# Patient Record
Sex: FEMALE | Race: WHITE | NOT HISPANIC OR LATINO
[De-identification: names, ages, dates, MRNs, and addresses within clinical notes are randomized per-mention and may not be internally consistent; named-entity substitution may affect disease eponyms.]

---

## 2019-03-06 PROBLEM — Z00.00 ENCOUNTER FOR PREVENTIVE HEALTH EXAMINATION: Status: ACTIVE | Noted: 2019-03-06

## 2019-04-22 ENCOUNTER — APPOINTMENT (OUTPATIENT)
Dept: ENDOCRINOLOGY | Facility: CLINIC | Age: 84
End: 2019-04-22
Payer: MEDICARE

## 2019-04-22 DIAGNOSIS — Z63.4 DISAPPEARANCE AND DEATH OF FAMILY MEMBER: ICD-10-CM

## 2019-04-22 DIAGNOSIS — Z78.9 OTHER SPECIFIED HEALTH STATUS: ICD-10-CM

## 2019-04-22 DIAGNOSIS — S06.5X9A TRAUMATIC SUBDURAL HEMORRHAGE WITH LOSS OF CONSCIOUSNESS OF UNSPECIFIED DURATION, INITIAL ENCOUNTER: ICD-10-CM

## 2019-04-22 SDOH — SOCIAL STABILITY - SOCIAL INSECURITY: DISSAPEARANCE AND DEATH OF FAMILY MEMBER: Z63.4

## 2019-04-29 ENCOUNTER — APPOINTMENT (OUTPATIENT)
Dept: ENDOCRINOLOGY | Facility: CLINIC | Age: 84
End: 2019-04-29
Payer: MEDICARE

## 2019-04-29 PROCEDURE — 95251 CONT GLUC MNTR ANALYSIS I&R: CPT

## 2019-04-29 PROCEDURE — 99214 OFFICE O/P EST MOD 30 MIN: CPT | Mod: 25

## 2019-04-29 PROCEDURE — 36415 COLL VENOUS BLD VENIPUNCTURE: CPT

## 2019-04-29 PROCEDURE — 95250 CONT GLUC MNTR PHYS/QHP EQP: CPT

## 2019-04-29 NOTE — HISTORY OF PRESENT ILLNESS
[FreeTextEntry1] : takes  Basaglar  10 units qam,  NISS ac B ( 13-15-18-19-84-52-24-25), ac L (6-3-6-32-84-68-14-15), ac D (4-5-9-9-10-11-12-13)\par \par no  recent labs available to me\par Blood sugars are checked 4 times a day.  \par \par Log book: FBS- 137-182 , ac L- 129-217, ac D - 158-233 ( few episodes of 53 and 73), HS- \par labs from 7/2/18- a1c- 7.6, creat- 1.01, LDL-116, TSH- 1.95, FT4- 1.37, 25D- 46.9\par \par \par Last dilated eye exam was in 6/18 (Dr Martinez)  \par Last podiatry visit was several years ago.  \par Last cardiology evaluation in 2017 (Dr. Bradley). \par Last stress test in 2017\par Last 2-D Echo in 2017\par \par \par \par HISTORY OF PRESENT ILLNESS.   \par Patient was diagnosed with Diabetes Mellitus Type 2 in  1987.   Denies known complications of retinopathy, nephropathy, or neuropathy.  \par Reports  history  HTN, dyslipidemia. Denies CAD. \par \par Reports GI intolerance (diarrhea) on most oral hypoglycemic medications in the past.  Past several weeks with occasional postprandial hypoglycemias in mid 60's- 70's. She is concerned regarding trying another OHG because of persistent diarrhea.\par \par Hypoglycemia frequency: reports history of  severe hypoglycemia several years ago, requiring  hospitalization.  \par Diet: not following ADA.  Exercise: none.

## 2019-04-29 NOTE — ASSESSMENT
[FreeTextEntry1] : Patient is reluctant to try any other OHG because of diarrhea, and prefers to remain on MDI regimen.\par - increase Basaglar 12 un \par - NISS ac B (13-15-18-13-76-24-24-25), ac L (9-4-5-8-9-10-11-12), ac D (1-0-1-9-10-11-12-13)\par - bedtime snack!\par - haleigh PRO\par Avoid hypoglycemia in this elderly patient with multiple medical issues\par - calcium/ D supplements. Falls prevention\par - advised on DXA scan- patient is to channel via PCP\par RTC 3 months. Bring sensor in 2-3 weeks .

## 2019-04-30 LAB
25(OH)D3 SERPL-MCNC: 31.3 NG/ML
ALBUMIN SERPL ELPH-MCNC: 4.2 G/DL
ALP BLD-CCNC: 84 U/L
ALT SERPL-CCNC: 9 U/L
ANION GAP SERPL CALC-SCNC: 15 MMOL/L
AST SERPL-CCNC: 12 U/L
BILIRUB SERPL-MCNC: 0.3 MG/DL
BUN SERPL-MCNC: 26 MG/DL
CALCIUM SERPL-MCNC: 9.3 MG/DL
CHLORIDE SERPL-SCNC: 108 MMOL/L
CO2 SERPL-SCNC: 20 MMOL/L
CREAT SERPL-MCNC: 1.19 MG/DL
ESTIMATED AVERAGE GLUCOSE: 140 MG/DL
FRUCTOSAMINE SERPL-MCNC: 256 UMOL/L
GLUCOSE SERPL-MCNC: 154 MG/DL
GLYCOMARK.: 14.5 UG/ML
HBA1C MFR BLD HPLC: 6.5 %
POTASSIUM SERPL-SCNC: 4.1 MMOL/L
PROT SERPL-MCNC: 6.9 G/DL
SODIUM SERPL-SCNC: 143 MMOL/L
T4 FREE SERPL-MCNC: 1.2 NG/DL
TSH SERPL-ACNC: 2.67 UIU/ML

## 2019-05-13 ENCOUNTER — APPOINTMENT (OUTPATIENT)
Dept: ENDOCRINOLOGY | Facility: CLINIC | Age: 84
End: 2019-05-13
Payer: MEDICARE

## 2019-05-13 VITALS
WEIGHT: 145 LBS | HEIGHT: 57.09 IN | BODY MASS INDEX: 31.28 KG/M2 | SYSTOLIC BLOOD PRESSURE: 130 MMHG | DIASTOLIC BLOOD PRESSURE: 70 MMHG

## 2019-05-13 LAB — GLUCOSE BLDC GLUCOMTR-MCNC: 209

## 2019-05-13 PROCEDURE — 99213 OFFICE O/P EST LOW 20 MIN: CPT | Mod: 25

## 2019-05-13 PROCEDURE — 82962 GLUCOSE BLOOD TEST: CPT

## 2019-05-13 NOTE — ASSESSMENT
[FreeTextEntry1] : Patient is reluctant to try any other OHG because of diarrhea, and prefers to remain on MDI regimen.\par - switch to Tresiba  8 un qd\par - decr  NISS ac B (9-10-12-83-83-62-19-20-21), ac L (0-9-2-8-6-1-10-11-12), ac D (9-8-3-8-9-10-11-12-13)\par - bedtime snack!\par Avoid hypoglycemia in this elderly patient with multiple medical issues\par - calcium/ D supplements. Falls prevention\par - advised on DXA scan- patient is to channel via PCP\par RTC 3 months.

## 2019-05-13 NOTE — HISTORY OF PRESENT ILLNESS
[FreeTextEntry1] : Patient returned for haleigh interpretation and diabetes management\par see detailed CDE note\par in target-77%\par below 70- 11%, and below 54- 3%\par freq in 70's in the middle of night, multiple hypo's below 70 ac lunch\par \par takes   Basaglar 12 un. noticed significantly lower BS while switching to Basaglar from Lantus. \par Self resumed Lantus 10 un this am. However, lantus m\par Also on  NISS ac B (13-15-18-07-93-82-24-25), ac L (0-5-2-8-9-10-11-12), ac D (4-4-2-9-10-11-12-13)\par \par a1c- 6.5, f/a- 256, g/m- 14.5\par TSH- 2.67\par Blood sugars are checked 4 times a day.  \par \par Log book: FBS- 137-182 , ac L- 129-217, ac D - 158-233 ( few episodes of 53 and 73), HS- \par labs from 7/2/18- a1c- 7.6, creat- 1.01, LDL-116, TSH- 1.95, FT4- 1.37, 25D- 46.9\par \par \par Last dilated eye exam was in 6/18 (Dr Martinez)  \par Last podiatry visit was several years ago.  \par Last cardiology evaluation in 2017 (Dr. Bradley). \par Last stress test in 2017\par Last 2-D Echo in 2017\par \par \par \par HISTORY OF PRESENT ILLNESS.   \par Patient was diagnosed with Diabetes Mellitus Type 2 in  1987.   Denies known complications of retinopathy, nephropathy, or neuropathy.  \par Reports  history  HTN, dyslipidemia. Denies CAD. \par \par Reports GI intolerance (diarrhea) on most oral hypoglycemic medications in the past.  Past several weeks with occasional postprandial hypoglycemias in mid 60's- 70's. She is concerned regarding trying another OHG because of persistent diarrhea.\par \par Hypoglycemia frequency: reports history of  severe hypoglycemia several years ago, requiring  hospitalization.  \par Diet: not following ADA.  Exercise: none.

## 2019-05-28 NOTE — REASON FOR VISIT
Procedure(s):  COLONOSCOPY  ESOPHAGOGASTRODUODENOSCOPY (EGD)  ESOPHAGOGASTRODUODENAL (EGD) BIOPSY  ENDOSCOPIC POLYPECTOMY. MAC    Anesthesia Post Evaluation      Multimodal analgesia: multimodal analgesia not used between 6 hours prior to anesthesia start to PACU discharge  Patient location during evaluation: PACU  Patient participation: complete - patient participated  Level of consciousness: awake and alert  Pain score: 0  Airway patency: patent  Anesthetic complications: no  Cardiovascular status: acceptable  Respiratory status: acceptable  Hydration status: acceptable  Post anesthesia nausea and vomiting:  none      Vitals Value Taken Time   /71 5/28/2019 12:13 PM   Temp     Pulse 85 5/28/2019 12:18 PM   Resp 18 5/28/2019 12:18 PM   SpO2 98 % 5/28/2019 12:18 PM   Vitals shown include unvalidated device data. [Follow-Up: _____] : a [unfilled] follow-up visit

## 2019-08-15 ENCOUNTER — APPOINTMENT (OUTPATIENT)
Dept: ENDOCRINOLOGY | Facility: CLINIC | Age: 84
End: 2019-08-15
Payer: MEDICARE

## 2019-08-15 VITALS
DIASTOLIC BLOOD PRESSURE: 70 MMHG | BODY MASS INDEX: 31.28 KG/M2 | RESPIRATION RATE: 16 BRPM | OXYGEN SATURATION: 98 % | SYSTOLIC BLOOD PRESSURE: 120 MMHG | HEART RATE: 63 BPM | WEIGHT: 145 LBS | HEIGHT: 57.09 IN

## 2019-08-15 LAB — GLUCOSE BLDC GLUCOMTR-MCNC: 130

## 2019-08-15 PROCEDURE — 99214 OFFICE O/P EST MOD 30 MIN: CPT | Mod: 25

## 2019-08-15 PROCEDURE — 36415 COLL VENOUS BLD VENIPUNCTURE: CPT

## 2019-08-15 PROCEDURE — 82962 GLUCOSE BLOOD TEST: CPT

## 2019-08-15 NOTE — HISTORY OF PRESENT ILLNESS
[FreeTextEntry1] : *** Aug 15, 2019 ***\par \par Tresiba is not covered\par *** hypotension on Basaglar***\par taking  Lantus 8 un qd;   NISS ac B (9-10-12-88-09-51-19-20-21), ac L (9-8-9-7-3-8-10-11-12), ac D (6-9-3-8-9-10-11-12-13)\par takes 4 un of novolog every night, before bedtime. reports occas midnight mid 60's\par tests 4 x day\par log: FBS-  122-166, ac L- 140-200, ac D- 132- 192, HS- 133-257\par \par \par *** May 13, 2019 ***\par \par Patient returned for haleigh interpretation and diabetes management\par see detailed CDE note\par in target-77%\par below 70- 11%, and below 54- 3%\par freq in 70's in the middle of night, multiple hypo's below 70 ac lunch\par \par takes   Basaglar 12 un. noticed significantly lower BS while switching to Basaglar from Lantus. \par Self resumed Lantus 10 un this am. However, lantus m\par Also on  NISS ac B (13-15-18-14-03-38-24-25), ac L (2-2-3-8-9-10-11-12), ac D (5-9-1-9-10-11-12-13)\par \par a1c- 6.5, f/a- 256, g/m- 14.5\par TSH- 2.67\par Blood sugars are checked 4 times a day.  \par \par Log book: FBS- 137-182 , ac L- 129-217, ac D - 158-233 ( few episodes of 53 and 73), HS- \par labs from 7/2/18- a1c- 7.6, creat- 1.01, LDL-116, TSH- 1.95, FT4- 1.37, 25D- 46.9\par \par \par Last dilated eye exam was in 6/18 (Dr Martinez)  \par Last podiatry visit was several years ago.  \par Last cardiology evaluation in 2017 (Dr. Bradley). \par Last stress test in 2017\par Last 2-D Echo in 2017\par \par \par \par HISTORY OF PRESENT ILLNESS.   \par Patient was diagnosed with Diabetes Mellitus Type 2 in  1987.   Denies known complications of retinopathy, nephropathy, or neuropathy.  \par Reports  history  HTN, dyslipidemia. Denies CAD. \par \par Reports GI intolerance (diarrhea) on most oral hypoglycemic medications in the past.  Past several weeks with occasional postprandial hypoglycemias in mid 60's- 70's. She is concerned regarding trying another OHG because of persistent diarrhea.\par \par Hypoglycemia frequency: reports history of  severe hypoglycemia several years ago, requiring  hospitalization.  \par Diet: not following ADA.  Exercise: none.

## 2019-08-15 NOTE — ASSESSMENT
Telephone Encounter by Ana Franklin at 06/09/17 02:06 PM     Author:  Ana Franklin Service:  (none) Author Type:       Filed:  06/09/17 02:06 PM Encounter Date:  6/7/2017 Status:  Signed     :  Ana Franklin ()            Patient notified.  Vernon verbalized understanding of information given.[SP1.1T]        Revision History        User Key Date/Time User Provider Type Action    > SP1.1 06/09/17 02:06 PM Ana Franklin  Sign    T - Template             [FreeTextEntry1] : Patient is reluctant to try any other OHG because of diarrhea, and prefers to remain on MDI regimen.\par - cont Lantus 8 un qd\par - change  NISS ac B (9-10-12-72-82-01-19-20-21), ac L (4-9-7-8-9-10-11-12-13), ac D (9-5-1-9-10-11-12-13-14)\par - NO novolog at bedtime and have bedtime snack!\par Avoid hypoglycemia in this elderly patient with multiple medical issues\par - calcium/ D supplements. Falls prevention\par - advised on DXA scan- patient is to channel via PCP\par - labs today\par - Jose 14 days\par RTC 3 months.

## 2019-08-16 LAB
25(OH)D3 SERPL-MCNC: 29.1 NG/ML
ALBUMIN SERPL ELPH-MCNC: 4 G/DL
ALP BLD-CCNC: 89 U/L
ALT SERPL-CCNC: 5 U/L
ANION GAP SERPL CALC-SCNC: 14 MMOL/L
AST SERPL-CCNC: 13 U/L
BILIRUB SERPL-MCNC: 0.2 MG/DL
BUN SERPL-MCNC: 32 MG/DL
CALCIUM SERPL-MCNC: 9 MG/DL
CHLORIDE SERPL-SCNC: 110 MMOL/L
CHOLEST SERPL-MCNC: 221 MG/DL
CHOLEST/HDLC SERPL: 4.8 RATIO
CO2 SERPL-SCNC: 19 MMOL/L
CREAT SERPL-MCNC: 1.52 MG/DL
ESTIMATED AVERAGE GLUCOSE: 143 MG/DL
FRUCTOSAMINE SERPL-MCNC: 254 UMOL/L
GLUCOSE SERPL-MCNC: 133 MG/DL
HBA1C MFR BLD HPLC: 6.6 %
HDLC SERPL-MCNC: 46 MG/DL
LDLC SERPL CALC-MCNC: 121 MG/DL
POTASSIUM SERPL-SCNC: 4.6 MMOL/L
PROT SERPL-MCNC: 6.6 G/DL
SODIUM SERPL-SCNC: 143 MMOL/L
T4 FREE SERPL-MCNC: 1.1 NG/DL
TRIGL SERPL-MCNC: 269 MG/DL
TSH SERPL-ACNC: 3 UIU/ML

## 2019-08-22 ENCOUNTER — RX CHANGE (OUTPATIENT)
Age: 84
End: 2019-08-22

## 2019-09-26 ENCOUNTER — APPOINTMENT (OUTPATIENT)
Dept: ENDOCRINOLOGY | Facility: CLINIC | Age: 84
End: 2019-09-26
Payer: MEDICARE

## 2019-09-26 VITALS
HEIGHT: 57.09 IN | OXYGEN SATURATION: 97 % | HEART RATE: 65 BPM | RESPIRATION RATE: 16 BRPM | SYSTOLIC BLOOD PRESSURE: 110 MMHG | WEIGHT: 140 LBS | BODY MASS INDEX: 30.2 KG/M2 | DIASTOLIC BLOOD PRESSURE: 60 MMHG

## 2019-09-26 LAB — GLUCOSE BLDC GLUCOMTR-MCNC: 200

## 2019-09-26 PROCEDURE — 99214 OFFICE O/P EST MOD 30 MIN: CPT | Mod: 25

## 2019-09-26 PROCEDURE — 82962 GLUCOSE BLOOD TEST: CPT

## 2019-09-26 NOTE — ASSESSMENT
[Hypoglycemia Management] : hypoglycemia management [Carbohydrate Consistent Diet] : carbohydrate consistent diet [Action and use of Insulin] : action and use of short and long-acting insulin [Diabetes Foot Care] : diabetes foot care [Self Monitoring of Blood Glucose] : self monitoring of blood glucose [FreeTextEntry1] : Patient is reluctant to try any other OHG because of diarrhea, and prefers to remain on MDI regimen.\par - cont Lantus 8 un qd\par - change  NISS ac B (7-8-10-94-88-21-17-18-19), ac L (0-2-1-8-9-10-11-12-13), ac D (1-6-3-10-11-12-13-14-15)\par - NO novolog at bedtime and must have bedtime snack!\par Avoid hypoglycemia in this elderly patient with multiple medical issues\par - calcium/ D supplements. Falls prevention\par - advised on DXA scan- patient is to channel via PCP\par - Jose 14 days\par RTC 3 months.

## 2019-09-26 NOTE — REASON FOR VISIT
CM met patient at bedside to complete discharge planning assessment. Patient lives with spouse, Giuseppe , who is also POA, denies living iwll, pharmacy is [facility], reports use of wheelchair, cane and or rolling walker as needs. Patient denies home health services. PCP is Dr. Edmondson and patient plans to return home with no needs.      04/30/19 1235   Discharge Assessment   Assessment Type Discharge Planning Assessment   Confirmed/corrected address and phone number on facesheet? Yes   Assessment information obtained from? Patient   Communicated expected length of stay with patient/caregiver yes   Prior to hospitilization cognitive status: Alert/Oriented   Prior to hospitalization functional status: Assistive Equipment   Current cognitive status: Alert/Oriented   Current Functional Status: Assistive Equipment   Lives With spouse   Able to Return to Prior Arrangements yes   Is patient able to care for self after discharge? Yes   Patient's perception of discharge disposition home or selfcare   Readmission Within the Last 30 Days no previous admission in last 30 days   Patient currently being followed by outpatient case management? No   Patient currently receives any other outside agency services? No   Equipment Currently Used at Home wheelchair;walker, rolling;cane, straight   Do you have any problems affording any of your prescribed medications? No   Is the patient taking medications as prescribed? yes   Does the patient have transportation home? Yes   Transportation Anticipated family or friend will provide   Does the patient receive services at [facility]? No   Discharge Plan A Home   DME Needed Upon Discharge  none   Patient/Family in Agreement with Plan yes      [Follow-Up: _____] : a [unfilled] follow-up visit

## 2019-09-26 NOTE — HISTORY OF PRESENT ILLNESS
[FreeTextEntry1] : F/u for DM2\par \par *** Sep 26, 2019 ***\par on Lantus 8 un, NISS ac B (9-10-12-82-59-20-19-20-21), ac L (9-9-2-8-9-10-11-12-13), ac D (5-7-3-9-10-11-12-13-14)\par freq injects novolog at bedtime if FS > 160 (uses half of the previous scale)\par log: FBS- 148-186, ac L- 114-142 (2 epsides of low 60's), ac D- 116-242, HS- \par \par a1c- 6.6, f/am- 254\par TSH- 3.0\par LDL- 121\par creat- 1.5\par *** Aug 15, 2019 ***\par \par Tresiba is not covered\par *** hypotension on Basaglar***\par taking  Lantus 8 un qd;   NISS ac B (9-10-12-92-48-64-19-20-21), ac L (1-1-6-4-1-6-10-11-12), ac D (8-3-9-8-9-10-11-12-13)\par takes 4 un of novolog every night, before bedtime. reports occas midnight mid 60's\par tests 4 x day\par log: FBS-  122-166, ac L- 140-200, ac D- 132- 192, HS- 133-257\par \par \par *** May 13, 2019 ***\par \par Patient returned for haleigh interpretation and diabetes management\par see detailed CDE note\par in target-77%\par below 70- 11%, and below 54- 3%\par freq in 70's in the middle of night, multiple hypo's below 70 ac lunch\par \par takes   Basaglar 12 un. noticed significantly lower BS while switching to Basaglar from Lantus. \par Self resumed Lantus 10 un this am. However, lantus m\par Also on  NISS ac B (13-15-18-05-80-70-24-25), ac L (0-9-3-8-9-10-11-12), ac D (1-9-0-9-10-11-12-13)\par \par a1c- 6.5, f/a- 256, g/m- 14.5\par TSH- 2.67\par Blood sugars are checked 4 times a day.  \par \par Log book: FBS- 137-182 , ac L- 129-217, ac D - 158-233 ( few episodes of 53 and 73), HS- \par labs from 7/2/18- a1c- 7.6, creat- 1.01, LDL-116, TSH- 1.95, FT4- 1.37, 25D- 46.9\par \par \par Last dilated eye exam was in 6/18 (Dr Martinez)  \par Last podiatry visit was several years ago.  \par Last cardiology evaluation in 2017 (Dr. Bradley). \par Last stress test in 2017\par Last 2-D Echo in 2017\par \par \par \par HISTORY OF PRESENT ILLNESS.   \par Patient was diagnosed with Diabetes Mellitus Type 2 in  1987.   Denies known complications of retinopathy, nephropathy, or neuropathy.  \par Reports  history  HTN, dyslipidemia. Denies CAD. \par \par Reports GI intolerance (diarrhea) on most oral hypoglycemic medications in the past.  Past several weeks with occasional postprandial hypoglycemias in mid 60's- 70's. She is concerned regarding trying another OHG because of persistent diarrhea.\par \par Hypoglycemia frequency: reports history of  severe hypoglycemia several years ago, requiring  hospitalization.  \par Diet: not following ADA.  Exercise: none.

## 2019-11-15 ENCOUNTER — APPOINTMENT (OUTPATIENT)
Dept: ENDOCRINOLOGY | Facility: CLINIC | Age: 84
End: 2019-11-15

## 2019-12-09 ENCOUNTER — APPOINTMENT (OUTPATIENT)
Dept: ENDOCRINOLOGY | Facility: CLINIC | Age: 84
End: 2019-12-09

## 2019-12-30 ENCOUNTER — APPOINTMENT (OUTPATIENT)
Dept: ENDOCRINOLOGY | Facility: CLINIC | Age: 84
End: 2019-12-30
Payer: MEDICARE

## 2019-12-30 VITALS
SYSTOLIC BLOOD PRESSURE: 140 MMHG | BODY MASS INDEX: 34.73 KG/M2 | DIASTOLIC BLOOD PRESSURE: 40 MMHG | HEIGHT: 57.09 IN | WEIGHT: 161 LBS

## 2019-12-30 LAB — GLUCOSE BLDC GLUCOMTR-MCNC: 204

## 2019-12-30 PROCEDURE — 99214 OFFICE O/P EST MOD 30 MIN: CPT

## 2019-12-30 PROCEDURE — 95250 CONT GLUC MNTR PHYS/QHP EQP: CPT

## 2019-12-30 PROCEDURE — 95251 CONT GLUC MNTR ANALYSIS I&R: CPT

## 2019-12-30 NOTE — ASSESSMENT
[Carbohydrate Consistent Diet] : carbohydrate consistent diet [Hypoglycemia Management] : hypoglycemia management [Diabetes Foot Care] : diabetes foot care [Action and use of Insulin] : action and use of short and long-acting insulin [Self Monitoring of Blood Glucose] : self monitoring of blood glucose [FreeTextEntry1] : Patient is reluctant to try any other OHG because of diarrhea, and prefers to remain on MDI regimen.\par - incr Tresiba 9 un HS\par - change  NISS ac B (4-5-7-10-12-13-14-15-16), ac L (4-6-6-8-9-10-11-12-13), ac D (7-9-10-55-09-93-14-15-16)\par - NO novolog at bedtime and must have bedtime snack!\par Avoid hypoglycemia in this elderly patient with multiple medical issues\par - calcium/ D supplements. Falls prevention\par - advised on DXA scan- patient is to channel via PCP\par - Jose PRO and reapply for Jose 14\par RTC 3 months.

## 2019-12-30 NOTE — HISTORY OF PRESENT ILLNESS
[FreeTextEntry1] : F/u for DM2\par \par *** Dec 30, 2019 ***\par \par on  Tresiba 8 un qd,  NISS ac B (7-8-10-51-76-63-17-18-19), ac L (1-8-1-8-9-10-11-12-13), ac D (7-3-8-10-11-12-13-14-15)\par still uses occas selena at bedtime\par tests 4x day\par log: fbs- 124-163, ac L- freq hypo's - 59- 73, other times 110-233, ac D- 127-300, HS- 104-347\par \par *** Sep 26, 2019 ***\par on Lantus 8 un, NISS ac B (9-10-12-92-22-72-19-20-21), ac L (4-2-1-8-9-10-11-12-13), ac D (6-8-3-9-10-11-12-13-14)\par freq injects novolog at bedtime if FS > 160 (uses half of the previous scale)\par log: FBS- 148-186, ac L- 114-142 (2 epsides of low 60's), ac D- 116-242, HS- \par \par a1c- 6.6, f/am- 254\par TSH- 3.0\par LDL- 121\par creat- 1.5\par *** Aug 15, 2019 ***\par \par Tresiba is not covered\par *** hypotension on Basaglar***\par taking  Lantus 8 un qd;   NISS ac B (9-10-12-51-64-99-19-20-21), ac L (4-1-9-9-7-0-10-11-12), ac D (7-9-4-8-9-10-11-12-13)\par takes 4 un of novolog every night, before bedtime. reports occas midnight mid 60's\par tests 4 x day\par log: FBS-  122-166, ac L- 140-200, ac D- 132- 192, HS- 133-257\par \par \par *** May 13, 2019 ***\par \par Patient returned for haleigh interpretation and diabetes management\par see detailed CDE note\par in target-77%\par below 70- 11%, and below 54- 3%\par freq in 70's in the middle of night, multiple hypo's below 70 ac lunch\par \par takes   Basaglar 12 un. noticed significantly lower BS while switching to Basaglar from Lantus. \par Self resumed Lantus 10 un this am. However, lantus m\par Also on  NISS ac B (13-15-18-88-50-78-24-25), ac L (1-9-3-8-9-10-11-12), ac D (8-5-7-9-10-11-12-13)\par \par a1c- 6.5, f/a- 256, g/m- 14.5\par TSH- 2.67\par Blood sugars are checked 4 times a day.  \par \par Log book: FBS- 137-182 , ac L- 129-217, ac D - 158-233 ( few episodes of 53 and 73), HS- \par labs from 7/2/18- a1c- 7.6, creat- 1.01, LDL-116, TSH- 1.95, FT4- 1.37, 25D- 46.9\par \par \par Last dilated eye exam was in 6/18 (Dr Martinez)  \par Last podiatry visit was several years ago.  \par Last cardiology evaluation in 2017 (Dr. Bradley). \par Last stress test in 2017\par Last 2-D Echo in 2017\par \par \par \par HISTORY OF PRESENT ILLNESS.   \par Patient was diagnosed with Diabetes Mellitus Type 2 in  1987.   Denies known complications of retinopathy, nephropathy, or neuropathy.  \par Reports  history  HTN, dyslipidemia. Denies CAD. \par \par Reports GI intolerance (diarrhea) on most oral hypoglycemic medications in the past.  Past several weeks with occasional postprandial hypoglycemias in mid 60's- 70's. She is concerned regarding trying another OHG because of persistent diarrhea.\par \par Hypoglycemia frequency: reports history of  severe hypoglycemia several years ago, requiring  hospitalization.  \par Diet: not following ADA.  Exercise: none.

## 2020-01-17 ENCOUNTER — RESULT CHARGE (OUTPATIENT)
Age: 85
End: 2020-01-17

## 2020-03-19 ENCOUNTER — RX RENEWAL (OUTPATIENT)
Age: 85
End: 2020-03-19

## 2020-08-21 ENCOUNTER — RX RENEWAL (OUTPATIENT)
Age: 85
End: 2020-08-21

## 2020-09-24 ENCOUNTER — APPOINTMENT (OUTPATIENT)
Dept: ENDOCRINOLOGY | Facility: CLINIC | Age: 85
End: 2020-09-24
Payer: MEDICARE

## 2020-09-24 VITALS
HEART RATE: 79 BPM | RESPIRATION RATE: 16 BRPM | TEMPERATURE: 98.1 F | DIASTOLIC BLOOD PRESSURE: 60 MMHG | HEIGHT: 57.09 IN | BODY MASS INDEX: 34.52 KG/M2 | WEIGHT: 160 LBS | OXYGEN SATURATION: 98 % | SYSTOLIC BLOOD PRESSURE: 125 MMHG

## 2020-09-24 LAB — GLUCOSE BLDC GLUCOMTR-MCNC: 297

## 2020-09-24 PROCEDURE — 82962 GLUCOSE BLOOD TEST: CPT

## 2020-09-24 PROCEDURE — 99214 OFFICE O/P EST MOD 30 MIN: CPT | Mod: 25

## 2020-09-24 RX ORDER — FLASH GLUCOSE SENSOR
KIT MISCELLANEOUS
Qty: 2 | Refills: 12 | Status: ACTIVE | COMMUNITY
Start: 2019-08-15 | End: 1900-01-01

## 2020-09-24 RX ORDER — BLOOD-GLUCOSE METER
70 EACH MISCELLANEOUS
Qty: 500 | Refills: 11 | Status: ACTIVE | COMMUNITY
Start: 2019-12-30 | End: 1900-01-01

## 2020-09-24 RX ORDER — FLASH GLUCOSE SCANNING READER
EACH MISCELLANEOUS
Qty: 1 | Refills: 0 | Status: ACTIVE | COMMUNITY
Start: 2019-08-15 | End: 1900-01-01

## 2020-09-24 RX ORDER — PEN NEEDLE, DIABETIC 29 G X1/2"
32G X 4 MM NEEDLE, DISPOSABLE MISCELLANEOUS
Qty: 400 | Refills: 3 | Status: ACTIVE | COMMUNITY
Start: 2019-12-30 | End: 1900-01-01

## 2020-09-24 NOTE — HISTORY OF PRESENT ILLNESS
[FreeTextEntry1] : F/u for DM2\par \par *** Sep 24, 2020 ***\par \par had COVID in 05/20, recovered from b/l PNA\par on Lantus 8 un HS, NISS ac B (6-0-9-10-12-13-14-15-16), ac L (1-6-1-8-9-10-11-12-13), ac D (7-9-10-38-59-58-14-15-16), but ac dinner  mostly takes half a dose. injects selena at midnight as well\par \par labs from 8/27/20- a1c- 8.4, creat- 1.4, TG- 284, LDL- 136\par \par log: fbs- 133-179, ac L- 170-159, ac D- 195-319, HS- 182-336\par \par *** Dec 30, 2019 ***\par \par on  Tresiba 8 un qd,  NISS ac B (7-8-10-29-78-66-17-18-19), ac L (5-7-5-8-9-10-11-12-13), ac D (2-6-7-10-11-12-13-14-15)\par still uses occas selena at bedtime\par tests 4x day\par log: fbs- 124-163, ac L- freq hypo's - 59- 73, other times 110-233, ac D- 127-300, HS- 104-347\par \par *** Sep 26, 2019 ***\par on Lantus 8 un, NISS ac B (9-10-12-73-81-36-19-20-21), ac L (6-3-5-8-9-10-11-12-13), ac D (2-9-8-9-10-11-12-13-14)\par freq injects novolog at bedtime if FS > 160 (uses half of the previous scale)\par log: FBS- 148-186, ac L- 114-142 (2 epsides of low 60's), ac D- 116-242, HS- \par \par a1c- 6.6, f/am- 254\par TSH- 3.0\par LDL- 121\par creat- 1.5\par *** Aug 15, 2019 ***\par \par Tresiba is not covered\par *** hypotension on Basaglar***\par taking  Lantus 8 un qd;   NISS ac B (9-10-12-35-16-14-19-20-21), ac L (8-7-3-5-0-6-10-11-12), ac D (5-1-9-8-9-10-11-12-13)\par takes 4 un of novolog every night, before bedtime. reports occas midnight mid 60's\par tests 4 x day\par log: FBS-  122-166, ac L- 140-200, ac D- 132- 192, HS- 133-257\par \par \par *** May 13, 2019 ***\par \par Patient returned for haleigh interpretation and diabetes management\par see detailed CDE note\par in target-77%\par below 70- 11%, and below 54- 3%\par freq in 70's in the middle of night, multiple hypo's below 70 ac lunch\par \par takes   Basaglar 12 un. noticed significantly lower BS while switching to Basaglar from Lantus. \par Self resumed Lantus 10 un this am. However, lantus m\par Also on  NISS ac B (13-15-18-85-29-99-24-25), ac L (5-6-5-8-9-10-11-12), ac D (2-1-3-9-10-11-12-13)\par \par a1c- 6.5, f/a- 256, g/m- 14.5\par TSH- 2.67\par Blood sugars are checked 4 times a day.  \par \par Log book: FBS- 137-182 , ac L- 129-217, ac D - 158-233 ( few episodes of 53 and 73), HS- \par labs from 7/2/18- a1c- 7.6, creat- 1.01, LDL-116, TSH- 1.95, FT4- 1.37, 25D- 46.9\par \par \par Last dilated eye exam was in 6/18 (Dr Martinez)  \par Last podiatry visit was several years ago.  \par Last cardiology evaluation in 2017 (Dr. Bradley). \par Last stress test in 2017\par Last 2-D Echo in 2017\par \par \par \par HISTORY OF PRESENT ILLNESS.   \par Patient was diagnosed with Diabetes Mellitus Type 2 in  1987.   Denies known complications of retinopathy, nephropathy, or neuropathy.  \par Reports  history  HTN, dyslipidemia. Denies CAD. \par \par Reports GI intolerance (diarrhea) on most oral hypoglycemic medications in the past.  Past several weeks with occasional postprandial hypoglycemias in mid 60's- 70's. She is concerned regarding trying another OHG because of persistent diarrhea.\par \par Hypoglycemia frequency: reports history of  severe hypoglycemia several years ago, requiring  hospitalization.  \par Diet: not following ADA.  Exercise: none.

## 2020-09-24 NOTE — ASSESSMENT
[Carbohydrate Consistent Diet] : carbohydrate consistent diet [Hypoglycemia Management] : hypoglycemia management [Diabetes Foot Care] : diabetes foot care [Action and use of Insulin] : action and use of short and long-acting insulin [Self Monitoring of Blood Glucose] : self monitoring of blood glucose [FreeTextEntry1] : Patient is reluctant to try any other OHG because of diarrhea, and prefers to remain on MDI regimen.\par - d/c Lantus and resume  Tresiba 10 un HS\par - change  NISS ac B (5-010-75-13-14-15-16-17), ac L (8-9-4-10-11-12-13-14-15), ac D (8-9-10-68-66-39-15-16-17)\par - NO novolog at bedtime and must have bedtime snack!\par Avoid hypoglycemia in this elderly patient with multiple medical issues\par - calcium/ D supplements. Falls prevention\par - advised on DXA scan- patient is to channel via PCP\par - reapply for Jose 14\par RTC 3 months.

## 2020-12-08 ENCOUNTER — APPOINTMENT (OUTPATIENT)
Dept: ENDOCRINOLOGY | Facility: CLINIC | Age: 85
End: 2020-12-08
Payer: MEDICARE

## 2020-12-08 VITALS
BODY MASS INDEX: 35.59 KG/M2 | RESPIRATION RATE: 20 BRPM | SYSTOLIC BLOOD PRESSURE: 138 MMHG | HEART RATE: 82 BPM | OXYGEN SATURATION: 99 % | DIASTOLIC BLOOD PRESSURE: 50 MMHG | WEIGHT: 165 LBS

## 2020-12-08 LAB — GLUCOSE BLDC GLUCOMTR-MCNC: 207

## 2020-12-08 PROCEDURE — 99214 OFFICE O/P EST MOD 30 MIN: CPT

## 2020-12-08 RX ORDER — PEN NEEDLE, DIABETIC 32 GX 1/6"
32G X 4 MM NEEDLE, DISPOSABLE MISCELLANEOUS
Qty: 4 | Refills: 3 | Status: ACTIVE | COMMUNITY
Start: 2020-12-08 | End: 1900-01-01

## 2020-12-08 RX ORDER — BLOOD SUGAR DIAGNOSTIC
STRIP MISCELLANEOUS
Qty: 500 | Refills: 6 | Status: ACTIVE | COMMUNITY
Start: 2020-02-25 | End: 1900-01-01

## 2020-12-08 RX ORDER — INSULIN ASPART 100 [IU]/ML
100 INJECTION, SOLUTION INTRAVENOUS; SUBCUTANEOUS
Qty: 5 | Refills: 3 | Status: ACTIVE | COMMUNITY
Start: 2020-10-28 | End: 1900-01-01

## 2020-12-08 NOTE — HISTORY OF PRESENT ILLNESS
[FreeTextEntry1] : F/u for DM2\par \par *** Dec 08, 2020 ***\par \par ran out of  Tresiba ; takes Lantus 10 un qd;   NISS ac B (8-9-10-46-72-14-15-16-17), ac L (1-8-1-10-11-12-13-14-15), ac D  (8-9-10-85-79-60-15-16-17) and extra novolog at bedime\par brought haleigh 2 for teaching\par no log book \par reports fbs- 140's- 150's, ac L- upto 200, ac D - low 200's\par \par *** Sep 24, 2020 ***\par \par had COVID in 05/20, recovered from b/l PNA\par on Lantus 8 un HS, NISS ac B (1-6-1-10-12-13-14-15-16), ac L (0-2-4-8-9-10-11-12-13), ac D (7-9-10-35-33-41-14-15-16), but ac dinner  mostly takes half a dose. injects selena at midnight as well\par \par labs from 8/27/20- a1c- 8.4, creat- 1.4, TG- 284, LDL- 136\par \par log: fbs- 133-179, ac L- 170-159, ac D- 195-319, HS- 182-336\par \par *** Dec 30, 2019 ***\par \par on  Tresiba 8 un qd,  NISS ac B (7-8-10-30-93-64-17-18-19), ac L (1-5-5-8-9-10-11-12-13), ac D (9-2-0-10-11-12-13-14-15)\par still uses occas selena at bedtime\par tests 4x day\par log: fbs- 124-163, ac L- freq hypo's - 59- 73, other times 110-233, ac D- 127-300, HS- 104-347\par \par *** Sep 26, 2019 ***\par on Lantus 8 un, NISS ac B (9-10-12-61-59-70-19-20-21), ac L (9-4-0-8-9-10-11-12-13), ac D (4-7-8-9-10-11-12-13-14)\par freq injects novolog at bedtime if FS > 160 (uses half of the previous scale)\par log: FBS- 148-186, ac L- 114-142 (2 epsides of low 60's), ac D- 116-242, HS- \par \par a1c- 6.6, f/am- 254\par TSH- 3.0\par LDL- 121\par creat- 1.5\par *** Aug 15, 2019 ***\par \par Tresiba is not covered\par *** hypotension on Basaglar***\par taking  Lantus 8 un qd;   NISS ac B (9-10-12-56-36-67-19-20-21), ac L (7-7-6-3-8-7-10-11-12), ac D (4-9-3-8-9-10-11-12-13)\par takes 4 un of novolog every night, before bedtime. reports occas midnight mid 60's\par tests 4 x day\par log: FBS-  122-166, ac L- 140-200, ac D- 132- 192, HS- 133-257\par \par \par *** May 13, 2019 ***\par \par Patient returned for haleigh interpretation and diabetes management\par see detailed CDE note\par in target-77%\par below 70- 11%, and below 54- 3%\par freq in 70's in the middle of night, multiple hypo's below 70 ac lunch\par \par takes   Basaglar 12 un. noticed significantly lower BS while switching to Basaglar from Lantus. \par Self resumed Lantus 10 un this am. However, lantus m\par Also on  NISS ac B (13-15-18-04-49-43-24-25), ac L (8-8-8-8-9-10-11-12), ac D (5-5-9-9-10-11-12-13)\par \par a1c- 6.5, f/a- 256, g/m- 14.5\par TSH- 2.67\par Blood sugars are checked 4 times a day.  \par \par Log book: FBS- 137-182 , ac L- 129-217, ac D - 158-233 ( few episodes of 53 and 73), HS- \par labs from 7/2/18- a1c- 7.6, creat- 1.01, LDL-116, TSH- 1.95, FT4- 1.37, 25D- 46.9\par \par \par Last dilated eye exam was in 6/18 (Dr Martinez)  \par Last podiatry visit was several years ago.  \par Last cardiology evaluation in 2017 (Dr. Bradley). \par Last stress test in 2017\par Last 2-D Echo in 2017\par \par \par \par HISTORY OF PRESENT ILLNESS.   \par Patient was diagnosed with Diabetes Mellitus Type 2 in  1987.   Denies known complications of retinopathy, nephropathy, or neuropathy.  \par Reports  history  HTN, dyslipidemia. Denies CAD. \par \par Reports GI intolerance (diarrhea) on most oral hypoglycemic medications in the past.  Past several weeks with occasional postprandial hypoglycemias in mid 60's- 70's. She is concerned regarding trying another OHG because of persistent diarrhea.\par \par Hypoglycemia frequency: reports history of  severe hypoglycemia several years ago, requiring  hospitalization.  \par Diet: not following ADA.  Exercise: none.

## 2020-12-08 NOTE — ASSESSMENT
[Carbohydrate Consistent Diet] : carbohydrate consistent diet [Hypoglycemia Management] : hypoglycemia management [Diabetes Foot Care] : diabetes foot care [Action and use of Insulin] : action and use of short and long-acting insulin [Self Monitoring of Blood Glucose] : self monitoring of blood glucose [FreeTextEntry1] : Patient is reluctant to try any other OHG because of diarrhea, and prefers to remain on MDI regimen.\par - switch back to  Tresiba 11 un HS\par - change  NISS ac B (8-10-12-14-15-16-17-18-20), ac L + D (8-9-10-37-30-70-15-16-17)\par - NO novolog at bedtime and must have bedtime snack!\par Avoid hypoglycemia in this elderly patient with multiple medical issues\par - calcium/ D supplements. Falls prevention\par - advised on DXA scan- patient is to channel via PCP\par - Jose 2 teaching today\par - labs today\par RTC 3 months.

## 2020-12-09 LAB
25(OH)D3 SERPL-MCNC: 39 NG/ML
ALBUMIN SERPL ELPH-MCNC: 4.2 G/DL
ALP BLD-CCNC: 90 U/L
ALT SERPL-CCNC: 6 U/L
ANION GAP SERPL CALC-SCNC: 12 MMOL/L
AST SERPL-CCNC: 13 U/L
BILIRUB SERPL-MCNC: <0.2 MG/DL
BUN SERPL-MCNC: 24 MG/DL
CALCIUM SERPL-MCNC: 9.1 MG/DL
CHLORIDE SERPL-SCNC: 105 MMOL/L
CHOLEST SERPL-MCNC: 209 MG/DL
CO2 SERPL-SCNC: 25 MMOL/L
CREAT SERPL-MCNC: 1.26 MG/DL
CREAT SPEC-SCNC: 32 MG/DL
ESTIMATED AVERAGE GLUCOSE: 183 MG/DL
FRUCTOSAMINE SERPL-MCNC: 313 UMOL/L
GLUCOSE SERPL-MCNC: 203 MG/DL
HBA1C MFR BLD HPLC: 8 %
HDLC SERPL-MCNC: 51 MG/DL
LDLC SERPL CALC-MCNC: 115 MG/DL
MICROALBUMIN 24H UR DL<=1MG/L-MCNC: 1.9 MG/DL
MICROALBUMIN/CREAT 24H UR-RTO: 59 MG/G
NONHDLC SERPL-MCNC: 158 MG/DL
POTASSIUM SERPL-SCNC: 4.7 MMOL/L
PROT SERPL-MCNC: 6.6 G/DL
SODIUM SERPL-SCNC: 141 MMOL/L
T4 FREE SERPL-MCNC: 1.2 NG/DL
TRIGL SERPL-MCNC: 217 MG/DL
TSH SERPL-ACNC: 1.82 UIU/ML

## 2021-03-15 ENCOUNTER — APPOINTMENT (OUTPATIENT)
Dept: ENDOCRINOLOGY | Facility: CLINIC | Age: 86
End: 2021-03-15

## 2021-04-12 ENCOUNTER — APPOINTMENT (OUTPATIENT)
Dept: ENDOCRINOLOGY | Facility: CLINIC | Age: 86
End: 2021-04-12
Payer: MEDICARE

## 2021-04-12 VITALS
OXYGEN SATURATION: 98 % | SYSTOLIC BLOOD PRESSURE: 138 MMHG | HEART RATE: 81 BPM | BODY MASS INDEX: 35.59 KG/M2 | TEMPERATURE: 97.6 F | RESPIRATION RATE: 18 BRPM | WEIGHT: 165 LBS | DIASTOLIC BLOOD PRESSURE: 60 MMHG

## 2021-04-12 LAB — GLUCOSE BLDC GLUCOMTR-MCNC: 163

## 2021-04-12 PROCEDURE — 99214 OFFICE O/P EST MOD 30 MIN: CPT | Mod: 25

## 2021-04-12 PROCEDURE — 95251 CONT GLUC MNTR ANALYSIS I&R: CPT

## 2021-04-12 NOTE — HISTORY OF PRESENT ILLNESS
[FreeTextEntry1] : F/u for DM2\par \par *** Apr 12, 2021 ***\par \par on  Tresiba 11 un HS,  NISS ac B (8-10-12-14-15-16-17-18-20), ac L + D (8-9-10-83-82-58-15-16-17). mostly using half of the dinner scale b/o fear of hypoglycemia\par labs done last week at PCP- results are pending\par sugar is much higher recently. no recent infections, not on steroids\par per daughter, forgets to inject insulin sometimes\par testing 4 times a day\par log: fbs- 141- 340, ac L-  203-312, ac D-  144-451, HS- 199-440\par \par Date of download:  4/12/21\par Diabetes Medications and Dosage: as above\par Indication for CGMS: verify a change in the treatment regimen, identify periods of hypoglycemia/ hyperglycemia. \par Modal day report: pattern. \par Pt with HYPO 0 % of the time (NONE below 54),  38% in target range\par Hyperglycemia:  62% elevation \par Identified issues: carbohydrate counting\par dates analyzed:3/14/21-3/27/21\par \par \par *** Dec 08, 2020 ***\par \par ran out of  Tresiba ; takes Lantus 10 un qd;   NISS ac B (8-9-10-83-60-22-15-16-17), ac L (4-5-8-10-11-12-13-14-15), ac D  (8-9-10-55-75-43-15-16-17) and extra novolog at bedime\par brought haleigh 2 for teaching\par no log book \par reports fbs- 140's- 150's, ac L- upto 200, ac D - low 200's\par \par *** Sep 24, 2020 ***\par \par had COVID in 05/20, recovered from b/l PNA\par on Lantus 8 un HS, NISS ac B (4-9-0-10-12-13-14-15-16), ac L (4-6-5-8-9-10-11-12-13), ac D (7-9-10-90-19-93-14-15-16), but ac dinner  mostly takes half a dose. injects selena at midnight as well\par \par labs from 8/27/20- a1c- 8.4, creat- 1.4, TG- 284, LDL- 136\par \par log: fbs- 133-179, ac L- 170-159, ac D- 195-319, HS- 182-336\par \par *** Dec 30, 2019 ***\par \par on  Tresiba 8 un qd,  NISS ac B (7-8-10-73-72-91-17-18-19), ac L (9-6-7-8-9-10-11-12-13), ac D (3-4-6-10-11-12-13-14-15)\par still uses occas selena at bedtime\par tests 4x day\par log: fbs- 124-163, ac L- freq hypo's - 59- 73, other times 110-233, ac D- 127-300, HS- 104-347\par \par *** Sep 26, 2019 ***\par on Lantus 8 un, NISS ac B (9-10-12-65-01-56-19-20-21), ac L (2-0-8-8-9-10-11-12-13), ac D (4-7-7-9-10-11-12-13-14)\par freq injects novolog at bedtime if FS > 160 (uses half of the previous scale)\par log: FBS- 148-186, ac L- 114-142 (2 epsides of low 60's), ac D- 116-242, HS- \par \par a1c- 6.6, f/am- 254\par TSH- 3.0\par LDL- 121\par creat- 1.5\par *** Aug 15, 2019 ***\par \par Tresiba is not covered\par *** hypotension on Basaglar***\par taking  Lantus 8 un qd;   NISS ac B (9-10-12-15-36-31-19-20-21), ac L (1-0-8-6-2-6-10-11-12), ac D (3-5-5-8-9-10-11-12-13)\par takes 4 un of novolog every night, before bedtime. reports occas midnight mid 60's\par tests 4 x day\par log: FBS-  122-166, ac L- 140-200, ac D- 132- 192, HS- 133-257\par \par \par *** May 13, 2019 ***\par \par Patient returned for haleigh interpretation and diabetes management\par see detailed CDE note\par in target-77%\par below 70- 11%, and below 54- 3%\par freq in 70's in the middle of night, multiple hypo's below 70 ac lunch\par \par takes   Basaglar 12 un. noticed significantly lower BS while switching to Basaglar from Lantus. \par Self resumed Lantus 10 un this am. However, lantus m\par Also on  NISS ac B (13-15-18-58-53-02-24-25), ac L (8-3-9-8-9-10-11-12), ac D (9-9-8-9-10-11-12-13)\par \par a1c- 6.5, f/a- 256, g/m- 14.5\par TSH- 2.67\par Blood sugars are checked 4 times a day.  \par \par Log book: FBS- 137-182 , ac L- 129-217, ac D - 158-233 ( few episodes of 53 and 73), HS- \par labs from 7/2/18- a1c- 7.6, creat- 1.01, LDL-116, TSH- 1.95, FT4- 1.37, 25D- 46.9\par \par \par Last dilated eye exam was in 6/18 (Dr Martinez)  \par Last podiatry visit was several years ago.  \par Last cardiology evaluation in 2017 (Dr. Bradley). \par Last stress test in 2017\par Last 2-D Echo in 2017\par \par \par \par HISTORY OF PRESENT ILLNESS.   \par Patient was diagnosed with Diabetes Mellitus Type 2 in  1987.   Denies known complications of retinopathy, nephropathy, or neuropathy.  \par Reports  history  HTN, dyslipidemia. Denies CAD. \par \par Reports GI intolerance (diarrhea) on most oral hypoglycemic medications in the past.  Past several weeks with occasional postprandial hypoglycemias in mid 60's- 70's. She is concerned regarding trying another OHG because of persistent diarrhea.\par \par Hypoglycemia frequency: reports history of  severe hypoglycemia several years ago, requiring  hospitalization.  \par Diet: not following ADA.  Exercise: none.

## 2021-04-12 NOTE — ASSESSMENT
[Carbohydrate Consistent Diet] : carbohydrate consistent diet [Hypoglycemia Management] : hypoglycemia management [Diabetes Foot Care] : diabetes foot care [Action and use of Insulin] : action and use of short and long-acting insulin [Self Monitoring of Blood Glucose] : self monitoring of blood glucose [FreeTextEntry1] : Patient is reluctant to try any other OHG because of diarrhea, and prefers to remain on MDI regimen.\par -Tresiba 14 un HS\par - change  NISS ac B+L (10-12-14-49-94-15-19-20-22), ac  D (6-1-9-9-10-11-12-13-14)\par - NO novolog at bedtime and must have bedtime snack!\par Avoid hypoglycemia in this elderly patient with multiple medical issues\par - calcium/ D supplements. Falls prevention\par - advised on DXA scan- patient is to channel via PCP\par - Jose 2 teaching today\par -obtain labs from PCP\par RTC 3 months.

## 2021-08-05 ENCOUNTER — RX RENEWAL (OUTPATIENT)
Age: 86
End: 2021-08-05

## 2021-08-05 RX ORDER — ALCOHOL ANTISEPTIC PADS
PADS, MEDICATED (EA) TOPICAL
Qty: 500 | Refills: 3 | Status: ACTIVE | COMMUNITY
Start: 2021-08-05 | End: 1900-01-01

## 2021-10-21 ENCOUNTER — NON-APPOINTMENT (OUTPATIENT)
Age: 86
End: 2021-10-21

## 2021-10-21 ENCOUNTER — APPOINTMENT (OUTPATIENT)
Dept: ENDOCRINOLOGY | Facility: CLINIC | Age: 86
End: 2021-10-21
Payer: MEDICARE

## 2021-10-21 PROCEDURE — 99443: CPT | Mod: 95

## 2021-10-21 NOTE — REASON FOR VISIT
[Follow - Up] : a follow-up visit [DM Type 2] : DM Type 2 [Family Member] : family member [Friend] : friend

## 2021-10-21 NOTE — ASSESSMENT
[Carbohydrate Consistent Diet] : carbohydrate consistent diet [Hypoglycemia Management] : hypoglycemia management [Diabetes Foot Care] : diabetes foot care [Action and use of Insulin] : action and use of short and long-acting insulin [Self Monitoring of Blood Glucose] : self monitoring of blood glucose [FreeTextEntry1] : Patient is reluctant to try any other OHG because of diarrhea, and prefers to remain on MDI regimen.\par - incr Tresiba 22 un HS\par - change  NISS ac B+L (10-12-14-68-97-67-19-20-22), ac  D (7-8-10-09-68-94-14-15)\par - NO novolog at bedtime and must have bedtime snack!\par Avoid hypoglycemia in this elderly patient with multiple medical issues\par - calcium/ D supplements. Falls prevention\par - advised on DXA scan- patient is to channel via PCP\par -obtain labs from PCP\par RTC asap for an in-person visit

## 2021-10-21 NOTE — HISTORY OF PRESENT ILLNESS
[Home] : at home, [unfilled] , at the time of the visit. [Medical Office: ([facility])___] : at the medical office located in  [Friend] : friend [Formal Caregiver] : formal caregiver [Verbal consent obtained from patient] : the patient, [unfilled] [FreeTextEntry1] : F/u for DM2\par \par *** Phone visit  Oct 21, 2021 ***\par \par under stress, sugar is much higher. s/p recent fall with rib fracture. discharged on a smaller dose of novolog\par taking Tresiba 16 to 18 un in am,  NISS ac meals 3 to 5 un \par by report fbs- low 200's, ac L- 270's- low 400's, ac D- 195- 333\par last a1c- 9.0\par \par *** Apr 12, 2021 ***\par \par on  Tresiba 11 un HS,  NISS ac B (8-10-12-14-15-16-17-18-20), ac L + D (8-9-10-90-30-37-15-16-17). mostly using half of the dinner scale b/o fear of hypoglycemia\par labs done last week at PCP- results are pending\par sugar is much higher recently. no recent infections, not on steroids\par per daughter, forgets to inject insulin sometimes\par testing 4 times a day\par log: fbs- 141- 340, ac L-  203-312, ac D-  144-451, HS- 199-440\par \par Date of download:  4/12/21\par Diabetes Medications and Dosage: as above\par Indication for CGMS: verify a change in the treatment regimen, identify periods of hypoglycemia/ hyperglycemia. \par Modal day report: pattern. \par Pt with HYPO 0 % of the time (NONE below 54),  38% in target range\par Hyperglycemia:  62% elevation \par Identified issues: carbohydrate counting\par dates analyzed:3/14/21-3/27/21\par \par \par *** Dec 08, 2020 ***\par \par ran out of  Tresiba ; takes Lantus 10 un qd;   NISS ac B (8-9-10-88-77-17-15-16-17), ac L (5-5-8-10-11-12-13-14-15), ac D  (8-9-10-57-22-27-15-16-17) and extra novolog at bedime\par brought haleigh 2 for teaching\par no log book \par reports fbs- 140's- 150's, ac L- upto 200, ac D - low 200's\par \par *** Sep 24, 2020 ***\par \par had COVID in 05/20, recovered from b/l PNA\par on Lantus 8 un HS, NISS ac B (2-6-4-10-12-13-14-15-16), ac L (4-7-7-8-9-10-11-12-13), ac D (7-9-10-91-22-68-14-15-16), but ac dinner  mostly takes half a dose. injects selena at midnight as well\par \par labs from 8/27/20- a1c- 8.4, creat- 1.4, TG- 284, LDL- 136\par \par log: fbs- 133-179, ac L- 170-159, ac D- 195-319, HS- 182-336\par \par *** Dec 30, 2019 ***\par \par on  Tresiba 8 un qd,  NISS ac B (7-8-10-24-68-75-17-18-19), ac L (2-9-2-8-9-10-11-12-13), ac D (4-7-9-10-11-12-13-14-15)\par still uses occas selena at bedtime\par tests 4x day\par log: fbs- 124-163, ac L- freq hypo's - 59- 73, other times 110-233, ac D- 127-300, HS- 104-347\par \par *** Sep 26, 2019 ***\par on Lantus 8 un, NISS ac B (9-10-12-62-85-63-19-20-21), ac L (5-5-3-8-9-10-11-12-13), ac D (3-6-9-9-10-11-12-13-14)\par freq injects novolog at bedtime if FS > 160 (uses half of the previous scale)\par log: FBS- 148-186, ac L- 114-142 (2 epsides of low 60's), ac D- 116-242, HS- \par \par a1c- 6.6, f/am- 254\par TSH- 3.0\par LDL- 121\par creat- 1.5\par *** Aug 15, 2019 ***\par \par Tresiba is not covered\par *** hypotension on Basaglar***\par taking  Lantus 8 un qd;   NISS ac B (9-10-12-74-51-29-19-20-21), ac L (7-4-6-9-4-7-10-11-12), ac D (7-3-1-8-9-10-11-12-13)\par takes 4 un of novolog every night, before bedtime. reports occas midnight mid 60's\par tests 4 x day\par log: FBS-  122-166, ac L- 140-200, ac D- 132- 192, HS- 133-257\par \par \par *** May 13, 2019 ***\par \par Patient returned for haleigh interpretation and diabetes management\par see detailed CDE note\par in target-77%\par below 70- 11%, and below 54- 3%\par freq in 70's in the middle of night, multiple hypo's below 70 ac lunch\par \par takes   Basaglar 12 un. noticed significantly lower BS while switching to Basaglar from Lantus. \par Self resumed Lantus 10 un this am. However, lantus m\par Also on  NISS ac B (13-15-18-93-66-26-24-25), ac L (1-9-9-8-9-10-11-12), ac D (1-3-7-9-10-11-12-13)\par \par a1c- 6.5, f/a- 256, g/m- 14.5\par TSH- 2.67\par Blood sugars are checked 4 times a day.  \par \par Log book: FBS- 137-182 , ac L- 129-217, ac D - 158-233 ( few episodes of 53 and 73), HS- \par labs from 7/2/18- a1c- 7.6, creat- 1.01, LDL-116, TSH- 1.95, FT4- 1.37, 25D- 46.9\par \par \par Last dilated eye exam was in 6/18 (Dr Martinez)  \par Last podiatry visit was several years ago.  \par Last cardiology evaluation in 2017 (Dr. Bradley). \par Last stress test in 2017\par Last 2-D Echo in 2017\par \par \par \par HISTORY OF PRESENT ILLNESS.   \par Patient was diagnosed with Diabetes Mellitus Type 2 in  1987.   Denies known complications of retinopathy, nephropathy, or neuropathy.  \par Reports  history  HTN, dyslipidemia. Denies CAD. \par \par Reports GI intolerance (diarrhea) on most oral hypoglycemic medications in the past.  Past several weeks with occasional postprandial hypoglycemias in mid 60's- 70's. She is concerned regarding trying another OHG because of persistent diarrhea.\par \par Hypoglycemia frequency: reports history of  severe hypoglycemia several years ago, requiring  hospitalization.  \par Diet: not following ADA.  Exercise: none.

## 2021-11-05 ENCOUNTER — RESULT CHARGE (OUTPATIENT)
Age: 86
End: 2021-11-05

## 2021-11-05 ENCOUNTER — APPOINTMENT (OUTPATIENT)
Dept: ENDOCRINOLOGY | Facility: CLINIC | Age: 86
End: 2021-11-05
Payer: MEDICARE

## 2021-11-05 VITALS
BODY MASS INDEX: 34.95 KG/M2 | OXYGEN SATURATION: 98 % | HEART RATE: 78 BPM | DIASTOLIC BLOOD PRESSURE: 56 MMHG | WEIGHT: 162 LBS | TEMPERATURE: 98 F | HEIGHT: 57.09 IN | SYSTOLIC BLOOD PRESSURE: 140 MMHG | RESPIRATION RATE: 17 BRPM

## 2021-11-05 DIAGNOSIS — I10 ESSENTIAL (PRIMARY) HYPERTENSION: ICD-10-CM

## 2021-11-05 DIAGNOSIS — E11.65 TYPE 2 DIABETES MELLITUS WITH HYPERGLYCEMIA: ICD-10-CM

## 2021-11-05 DIAGNOSIS — M81.0 AGE-RELATED OSTEOPOROSIS W/OUT CURRENT PATHOLOGICAL FRACTURE: ICD-10-CM

## 2021-11-05 LAB — GLUCOSE BLDC GLUCOMTR-MCNC: 203

## 2021-11-05 PROCEDURE — 82962 GLUCOSE BLOOD TEST: CPT

## 2021-11-05 PROCEDURE — 99214 OFFICE O/P EST MOD 30 MIN: CPT | Mod: 25

## 2021-11-05 NOTE — ASSESSMENT
[Carbohydrate Consistent Diet] : carbohydrate consistent diet [Hypoglycemia Management] : hypoglycemia management [Diabetes Foot Care] : diabetes foot care [Action and use of Insulin] : action and use of short and long-acting insulin [Self Monitoring of Blood Glucose] : self monitoring of blood glucose [FreeTextEntry1] : Patient is reluctant to try any other OHG because of diarrhea, and prefers to remain on MDI regimen.\par - Tresiba 22 un HS\par - change  NISS ac B(10-12-14-77-70-57-19-20-22), ac L (9-10-12-14-15-16-17-18-20),  ac D (4-5-9-9-10-11-12-13-14)\par - NO novolog at bedtime and must have bedtime snack!\par Avoid hypoglycemia in this elderly patient with multiple medical issues\par - calcium/ D supplements. Falls prevention\par - advised on DXA scan- patient is to channel via PCP\par - labs today\par - haleigh PRO and resume personal CGMS\par RTC 3 mos

## 2021-11-05 NOTE — HISTORY OF PRESENT ILLNESS
[FreeTextEntry1] : F/u for diabetes management\par \par *** Nov 05, 2021 ***\par \par feels well, more forgetful\par ran out of Jose\par taking Tresiba 20 to 22 un HS,  should be on NISS ac B+L (10-12-14-88-74-69-19-20-22), ac  D (7-8-10-66-86-67-14-15), but is frequently missing ac meal insulin if FS in low-mis 100's\par \par log: fbs- 121-251, ac L- 139-193, ac D- , HS- 235-295\par \par no recent labs are available\par \par *** Phone visit  Oct 21, 2021 ***\par \par under stress, sugar is much higher. s/p recent fall with rib fracture. discharged on a smaller dose of novolog\par taking Tresiba 16 to 18 un in am,  NISS ac meals 3 to 5 un \par by report fbs- low 200's, ac L- 270's- low 400's, ac D- 195- 333\par last a1c- 9.0\par \par *** Apr 12, 2021 ***\par \par on  Tresiba 11 un HS,  NISS ac B (8-10-12-14-15-16-17-18-20), ac L + D (8-9-10-84-54-67-15-16-17). mostly using half of the dinner scale b/o fear of hypoglycemia\par labs done last week at PCP- results are pending\par sugar is much higher recently. no recent infections, not on steroids\par per daughter, forgets to inject insulin sometimes\par testing 4 times a day\par log: fbs- 141- 340, ac L-  203-312, ac D-  144-451, HS- 199-440\par \par Date of download:  4/12/21\par Diabetes Medications and Dosage: as above\par Indication for CGMS: verify a change in the treatment regimen, identify periods of hypoglycemia/ hyperglycemia. \par Modal day report: pattern. \par Pt with HYPO 0 % of the time (NONE below 54),  38% in target range\par Hyperglycemia:  62% elevation \par Identified issues: carbohydrate counting\par dates analyzed:3/14/21-3/27/21\par \par \par *** Dec 08, 2020 ***\par \par ran out of  Tresiba ; takes Lantus 10 un qd;   NISS ac B (8-9-10-83-87-70-15-16-17), ac L (0-5-9-10-11-12-13-14-15), ac D  (8-9-10-44-24-25-15-16-17) and extra novolog at bedime\par brought jose 2 for teaching\par no log book \par reports fbs- 140's- 150's, ac L- upto 200, ac D - low 200's\par \par *** Sep 24, 2020 ***\par \par had COVID in 05/20, recovered from b/l PNA\par on Lantus 8 un HS, NISS ac B (6-2-4-10-12-13-14-15-16), ac L (4-0-1-8-9-10-11-12-13), ac D (7-9-10-88-01-80-14-15-16), but ac dinner  mostly takes half a dose. injects selena at midnight as well\par \par labs from 8/27/20- a1c- 8.4, creat- 1.4, TG- 284, LDL- 136\par \par log: fbs- 133-179, ac L- 170-159, ac D- 195-319, HS- 182-336\par \par *** Dec 30, 2019 ***\par \par on  Tresiba 8 un qd,  NISS ac B (7-8-10-98-98-88-17-18-19), ac L (5-5-9-8-9-10-11-12-13), ac D (0-3-7-10-11-12-13-14-15)\par still uses occas selena at bedtime\par tests 4x day\par log: fbs- 124-163, ac L- freq hypo's - 59- 73, other times 110-233, ac D- 127-300, HS- 104-347\par \par *** Sep 26, 2019 ***\par on Lantus 8 un, NISS ac B (9-10-12-56-11-52-19-20-21), ac L (3-8-5-8-9-10-11-12-13), ac D (6-2-1-9-10-11-12-13-14)\par freq injects novolog at bedtime if FS > 160 (uses half of the previous scale)\par log: FBS- 148-186, ac L- 114-142 (2 epsides of low 60's), ac D- 116-242, HS- \par \par a1c- 6.6, f/am- 254\par TSH- 3.0\par LDL- 121\par creat- 1.5\par *** Aug 15, 2019 ***\par \par Tresiba is not covered\par *** hypotension on Basaglar***\par taking  Lantus 8 un qd;   NISS ac B (9-10-12-07-29-89-19-20-21), ac L (9-6-3-5-5-9-10-11-12), ac D (0-5-0-8-9-10-11-12-13)\par takes 4 un of novolog every night, before bedtime. reports occas midnight mid 60's\par tests 4 x day\par log: FBS-  122-166, ac L- 140-200, ac D- 132- 192, HS- 133-257\par \par \par *** May 13, 2019 ***\par \par Patient returned for jose interpretation and diabetes management\par see detailed CDE note\par in target-77%\par below 70- 11%, and below 54- 3%\par freq in 70's in the middle of night, multiple hypo's below 70 ac lunch\par \par takes   Basaglar 12 un. noticed significantly lower BS while switching to Basaglar from Lantus. \par Self resumed Lantus 10 un this am. However, lantus m\par Also on  NISS ac B (13-15-18-86-70-77-24-25), ac L (3-4-7-8-9-10-11-12), ac D (1-1-1-9-10-11-12-13)\par \par a1c- 6.5, f/a- 256, g/m- 14.5\par TSH- 2.67\par Blood sugars are checked 4 times a day.  \par \par Log book: FBS- 137-182 , ac L- 129-217, ac D - 158-233 ( few episodes of 53 and 73), HS- \par labs from 7/2/18- a1c- 7.6, creat- 1.01, LDL-116, TSH- 1.95, FT4- 1.37, 25D- 46.9\par \par \par Last dilated eye exam was in 6/18 (Dr Martinez)  \par Last podiatry visit was several years ago.  \par Last cardiology evaluation in 2017 (Dr. Bradley). \par Last stress test in 2017\par Last 2-D Echo in 2017\par \par \par \par HISTORY OF PRESENT ILLNESS.   \par Patient was diagnosed with Diabetes Mellitus Type 2 in  1987.   Denies known complications of retinopathy, nephropathy, or neuropathy.  \par Reports  history  HTN, dyslipidemia. Denies CAD. \par \par Reports GI intolerance (diarrhea) on most oral hypoglycemic medications in the past.  Past several weeks with occasional postprandial hypoglycemias in mid 60's- 70's. She is concerned regarding trying another OHG because of persistent diarrhea.\par \par Hypoglycemia frequency: reports history of  severe hypoglycemia several years ago, requiring  hospitalization.  \par Diet: not following ADA.  Exercise: none.

## 2021-11-08 ENCOUNTER — NON-APPOINTMENT (OUTPATIENT)
Age: 86
End: 2021-11-08

## 2021-11-08 LAB
25(OH)D3 SERPL-MCNC: 58.6 NG/ML
ALBUMIN SERPL ELPH-MCNC: 4.2 G/DL
ALP BLD-CCNC: 88 U/L
ALT SERPL-CCNC: 9 U/L
ANION GAP SERPL CALC-SCNC: 17 MMOL/L
AST SERPL-CCNC: 12 U/L
BASOPHILS # BLD AUTO: 0.06 K/UL
BASOPHILS NFR BLD AUTO: 0.5 %
BILIRUB SERPL-MCNC: 0.2 MG/DL
BUN SERPL-MCNC: 25 MG/DL
CALCIUM SERPL-MCNC: 9.5 MG/DL
CHLORIDE SERPL-SCNC: 91 MMOL/L
CHOLEST SERPL-MCNC: 216 MG/DL
CO2 SERPL-SCNC: 21 MMOL/L
CREAT SERPL-MCNC: 1.17 MG/DL
CREAT SPEC-SCNC: 98 MG/DL
EOSINOPHIL # BLD AUTO: 0.06 K/UL
EOSINOPHIL NFR BLD AUTO: 0.5 %
ESTIMATED AVERAGE GLUCOSE: 217 MG/DL
FOLATE SERPL-MCNC: 11 NG/ML
FRUCTOSAMINE SERPL-MCNC: 315 UMOL/L
GLUCOSE SERPL-MCNC: 193 MG/DL
HBA1C MFR BLD HPLC: 9.2 %
HCT VFR BLD CALC: 32.4 %
HDLC SERPL-MCNC: 57 MG/DL
HGB BLD-MCNC: 10.7 G/DL
IMM GRANULOCYTES NFR BLD AUTO: 0.6 %
LDLC SERPL CALC-MCNC: 127 MG/DL
LYMPHOCYTES # BLD AUTO: 2.91 K/UL
LYMPHOCYTES NFR BLD AUTO: 24.4 %
MAN DIFF?: NORMAL
MCHC RBC-ENTMCNC: 28.5 PG
MCHC RBC-ENTMCNC: 33 GM/DL
MCV RBC AUTO: 86.4 FL
MICROALBUMIN 24H UR DL<=1MG/L-MCNC: 1.9 MG/DL
MICROALBUMIN/CREAT 24H UR-RTO: 20 MG/G
MONOCYTES # BLD AUTO: 0.68 K/UL
MONOCYTES NFR BLD AUTO: 5.7 %
NEUTROPHILS # BLD AUTO: 8.13 K/UL
NEUTROPHILS NFR BLD AUTO: 68.3 %
NONHDLC SERPL-MCNC: 159 MG/DL
PLATELET # BLD AUTO: 293 K/UL
POTASSIUM SERPL-SCNC: 5 MMOL/L
PROT SERPL-MCNC: 6.5 G/DL
RBC # BLD: 3.75 M/UL
RBC # FLD: 14.7 %
SODIUM SERPL-SCNC: 129 MMOL/L
T4 FREE SERPL-MCNC: 1.4 NG/DL
TRIGL SERPL-MCNC: 163 MG/DL
TSH SERPL-ACNC: 3.1 UIU/ML
VIT B12 SERPL-MCNC: 708 PG/ML
WBC # FLD AUTO: 11.91 K/UL

## 2021-11-23 ENCOUNTER — INPATIENT (INPATIENT)
Facility: HOSPITAL | Age: 86
LOS: 5 days | Discharge: HOME | End: 2021-11-29
Attending: INTERNAL MEDICINE | Admitting: INTERNAL MEDICINE
Payer: MEDICARE

## 2021-11-23 VITALS
OXYGEN SATURATION: 100 % | DIASTOLIC BLOOD PRESSURE: 83 MMHG | SYSTOLIC BLOOD PRESSURE: 146 MMHG | HEART RATE: 80 BPM | RESPIRATION RATE: 16 BRPM | TEMPERATURE: 98 F

## 2021-11-23 DIAGNOSIS — R10.9 UNSPECIFIED ABDOMINAL PAIN: ICD-10-CM

## 2021-11-23 DIAGNOSIS — D72.829 ELEVATED WHITE BLOOD CELL COUNT, UNSPECIFIED: ICD-10-CM

## 2021-11-23 DIAGNOSIS — Z91.81 HISTORY OF FALLING: ICD-10-CM

## 2021-11-23 DIAGNOSIS — E78.5 HYPERLIPIDEMIA, UNSPECIFIED: ICD-10-CM

## 2021-11-23 DIAGNOSIS — E87.1 HYPO-OSMOLALITY AND HYPONATREMIA: ICD-10-CM

## 2021-11-23 DIAGNOSIS — R07.9 CHEST PAIN, UNSPECIFIED: ICD-10-CM

## 2021-11-23 DIAGNOSIS — Z29.9 ENCOUNTER FOR PROPHYLACTIC MEASURES, UNSPECIFIED: ICD-10-CM

## 2021-11-23 DIAGNOSIS — E11.9 TYPE 2 DIABETES MELLITUS WITHOUT COMPLICATIONS: ICD-10-CM

## 2021-11-23 DIAGNOSIS — D64.9 ANEMIA, UNSPECIFIED: ICD-10-CM

## 2021-11-23 DIAGNOSIS — I21.4 NON-ST ELEVATION (NSTEMI) MYOCARDIAL INFARCTION: ICD-10-CM

## 2021-11-23 DIAGNOSIS — I10 ESSENTIAL (PRIMARY) HYPERTENSION: ICD-10-CM

## 2021-11-23 LAB
A1C WITH ESTIMATED AVERAGE GLUCOSE RESULT: 8.2 % — HIGH (ref 4–5.6)
ALBUMIN SERPL ELPH-MCNC: 3.7 G/DL — SIGNIFICANT CHANGE UP (ref 3.3–5)
ALP SERPL-CCNC: 84 U/L — SIGNIFICANT CHANGE UP (ref 40–120)
ALT FLD-CCNC: 8 U/L — SIGNIFICANT CHANGE UP (ref 4–33)
ANION GAP SERPL CALC-SCNC: 13 MMOL/L — SIGNIFICANT CHANGE UP (ref 7–14)
ANION GAP SERPL CALC-SCNC: 13 MMOL/L — SIGNIFICANT CHANGE UP (ref 7–14)
ANION GAP SERPL CALC-SCNC: 14 MMOL/L — SIGNIFICANT CHANGE UP (ref 7–14)
APPEARANCE UR: CLEAR — SIGNIFICANT CHANGE UP
APTT BLD: 26.7 SEC — LOW (ref 27–36.3)
AST SERPL-CCNC: 22 U/L — SIGNIFICANT CHANGE UP (ref 4–32)
BACTERIA # UR AUTO: NEGATIVE — SIGNIFICANT CHANGE UP
BASE EXCESS BLDV CALC-SCNC: -0.5 MMOL/L — SIGNIFICANT CHANGE UP (ref -2–3)
BASOPHILS # BLD AUTO: 0.05 K/UL — SIGNIFICANT CHANGE UP (ref 0–0.2)
BASOPHILS NFR BLD AUTO: 0.4 % — SIGNIFICANT CHANGE UP (ref 0–2)
BILIRUB SERPL-MCNC: 0.5 MG/DL — SIGNIFICANT CHANGE UP (ref 0.2–1.2)
BILIRUB UR-MCNC: NEGATIVE — SIGNIFICANT CHANGE UP
BLOOD GAS VENOUS COMPREHENSIVE RESULT: SIGNIFICANT CHANGE UP
BUN SERPL-MCNC: 13 MG/DL — SIGNIFICANT CHANGE UP (ref 7–23)
BUN SERPL-MCNC: 14 MG/DL — SIGNIFICANT CHANGE UP (ref 7–23)
BUN SERPL-MCNC: 14 MG/DL — SIGNIFICANT CHANGE UP (ref 7–23)
CALCIUM SERPL-MCNC: 8.9 MG/DL — SIGNIFICANT CHANGE UP (ref 8.4–10.5)
CALCIUM SERPL-MCNC: 8.9 MG/DL — SIGNIFICANT CHANGE UP (ref 8.4–10.5)
CALCIUM SERPL-MCNC: 9.2 MG/DL — SIGNIFICANT CHANGE UP (ref 8.4–10.5)
CHLORIDE BLDV-SCNC: 92 MMOL/L — LOW (ref 96–108)
CHLORIDE SERPL-SCNC: 89 MMOL/L — LOW (ref 98–107)
CHLORIDE SERPL-SCNC: 89 MMOL/L — LOW (ref 98–107)
CHLORIDE SERPL-SCNC: 90 MMOL/L — LOW (ref 98–107)
CO2 BLDV-SCNC: 25.4 MMOL/L — SIGNIFICANT CHANGE UP (ref 22–26)
CO2 SERPL-SCNC: 20 MMOL/L — LOW (ref 22–31)
CO2 SERPL-SCNC: 21 MMOL/L — LOW (ref 22–31)
CO2 SERPL-SCNC: 22 MMOL/L — SIGNIFICANT CHANGE UP (ref 22–31)
COLOR SPEC: ABNORMAL
CREAT ?TM UR-MCNC: 44 MG/DL — SIGNIFICANT CHANGE UP
CREAT SERPL-MCNC: 0.89 MG/DL — SIGNIFICANT CHANGE UP (ref 0.5–1.3)
CREAT SERPL-MCNC: 0.9 MG/DL — SIGNIFICANT CHANGE UP (ref 0.5–1.3)
CREAT SERPL-MCNC: 0.93 MG/DL — SIGNIFICANT CHANGE UP (ref 0.5–1.3)
DIFF PNL FLD: ABNORMAL
EOSINOPHIL # BLD AUTO: 0.02 K/UL — SIGNIFICANT CHANGE UP (ref 0–0.5)
EOSINOPHIL NFR BLD AUTO: 0.1 % — SIGNIFICANT CHANGE UP (ref 0–6)
EPI CELLS # UR: 1 /HPF — SIGNIFICANT CHANGE UP (ref 0–5)
ESTIMATED AVERAGE GLUCOSE: 189 — SIGNIFICANT CHANGE UP
GAS PNL BLDV: 123 MMOL/L — LOW (ref 136–145)
GLUCOSE BLDC GLUCOMTR-MCNC: 208 MG/DL — HIGH (ref 70–99)
GLUCOSE BLDC GLUCOMTR-MCNC: 234 MG/DL — HIGH (ref 70–99)
GLUCOSE BLDC GLUCOMTR-MCNC: 250 MG/DL — HIGH (ref 70–99)
GLUCOSE BLDV-MCNC: 169 MG/DL — HIGH (ref 70–99)
GLUCOSE SERPL-MCNC: 161 MG/DL — HIGH (ref 70–99)
GLUCOSE SERPL-MCNC: 230 MG/DL — HIGH (ref 70–99)
GLUCOSE SERPL-MCNC: 241 MG/DL — HIGH (ref 70–99)
GLUCOSE UR QL: NEGATIVE — SIGNIFICANT CHANGE UP
HCO3 BLDV-SCNC: 24 MMOL/L — SIGNIFICANT CHANGE UP (ref 22–29)
HCT VFR BLD CALC: 32.4 % — LOW (ref 34.5–45)
HCT VFR BLDA CALC: 33 % — LOW (ref 34.5–46.5)
HGB BLD CALC-MCNC: 11 G/DL — LOW (ref 11.5–15.5)
HGB BLD-MCNC: 11.1 G/DL — LOW (ref 11.5–15.5)
HYALINE CASTS # UR AUTO: 0 /LPF — SIGNIFICANT CHANGE UP (ref 0–7)
IANC: 10.5 K/UL — HIGH (ref 1.5–8.5)
IMM GRANULOCYTES NFR BLD AUTO: 0.7 % — SIGNIFICANT CHANGE UP (ref 0–1.5)
INR BLD: 1.05 RATIO — SIGNIFICANT CHANGE UP (ref 0.88–1.16)
KETONES UR-MCNC: NEGATIVE — SIGNIFICANT CHANGE UP
LACTATE BLDV-MCNC: 1.4 MMOL/L — SIGNIFICANT CHANGE UP (ref 0.5–2)
LEUKOCYTE ESTERASE UR-ACNC: NEGATIVE — SIGNIFICANT CHANGE UP
LYMPHOCYTES # BLD AUTO: 16 % — SIGNIFICANT CHANGE UP (ref 13–44)
LYMPHOCYTES # BLD AUTO: 2.18 K/UL — SIGNIFICANT CHANGE UP (ref 1–3.3)
MAGNESIUM SERPL-MCNC: 1.5 MG/DL — LOW (ref 1.6–2.6)
MCHC RBC-ENTMCNC: 28.5 PG — SIGNIFICANT CHANGE UP (ref 27–34)
MCHC RBC-ENTMCNC: 34.3 GM/DL — SIGNIFICANT CHANGE UP (ref 32–36)
MCV RBC AUTO: 83.1 FL — SIGNIFICANT CHANGE UP (ref 80–100)
MONOCYTES # BLD AUTO: 0.8 K/UL — SIGNIFICANT CHANGE UP (ref 0–0.9)
MONOCYTES NFR BLD AUTO: 5.9 % — SIGNIFICANT CHANGE UP (ref 2–14)
NEUTROPHILS # BLD AUTO: 10.5 K/UL — HIGH (ref 1.8–7.4)
NEUTROPHILS NFR BLD AUTO: 76.9 % — SIGNIFICANT CHANGE UP (ref 43–77)
NITRITE UR-MCNC: NEGATIVE — SIGNIFICANT CHANGE UP
NRBC # BLD: 0 /100 WBCS — SIGNIFICANT CHANGE UP
NRBC # FLD: 0 K/UL — SIGNIFICANT CHANGE UP
NT-PROBNP SERPL-SCNC: 259 PG/ML — SIGNIFICANT CHANGE UP
OSMOLALITY SERPL: 270 MOSM/KG — LOW (ref 275–295)
OSMOLALITY UR: 391 MOSM/KG — SIGNIFICANT CHANGE UP (ref 50–1200)
PCO2 BLDV: 39 MMHG — SIGNIFICANT CHANGE UP (ref 39–42)
PH BLDV: 7.4 — SIGNIFICANT CHANGE UP (ref 7.32–7.43)
PH UR: 7 — SIGNIFICANT CHANGE UP (ref 5–8)
PHOSPHATE SERPL-MCNC: 2.5 MG/DL — SIGNIFICANT CHANGE UP (ref 2.5–4.5)
PLATELET # BLD AUTO: 342 K/UL — SIGNIFICANT CHANGE UP (ref 150–400)
PO2 BLDV: 49 MMHG — SIGNIFICANT CHANGE UP
POTASSIUM BLDV-SCNC: 4.3 MMOL/L — SIGNIFICANT CHANGE UP (ref 3.5–5.1)
POTASSIUM SERPL-MCNC: 3.7 MMOL/L — SIGNIFICANT CHANGE UP (ref 3.5–5.3)
POTASSIUM SERPL-MCNC: 4 MMOL/L — SIGNIFICANT CHANGE UP (ref 3.5–5.3)
POTASSIUM SERPL-MCNC: 4.3 MMOL/L — SIGNIFICANT CHANGE UP (ref 3.5–5.3)
POTASSIUM SERPL-SCNC: 3.7 MMOL/L — SIGNIFICANT CHANGE UP (ref 3.5–5.3)
POTASSIUM SERPL-SCNC: 4 MMOL/L — SIGNIFICANT CHANGE UP (ref 3.5–5.3)
POTASSIUM SERPL-SCNC: 4.3 MMOL/L — SIGNIFICANT CHANGE UP (ref 3.5–5.3)
PROT SERPL-MCNC: 6.8 G/DL — SIGNIFICANT CHANGE UP (ref 6–8.3)
PROT UR-MCNC: ABNORMAL
PROTHROM AB SERPL-ACNC: 12 SEC — SIGNIFICANT CHANGE UP (ref 10.6–13.6)
RBC # BLD: 3.9 M/UL — SIGNIFICANT CHANGE UP (ref 3.8–5.2)
RBC # FLD: 14.5 % — SIGNIFICANT CHANGE UP (ref 10.3–14.5)
RBC CASTS # UR COMP ASSIST: 12 /HPF — HIGH (ref 0–4)
SAO2 % BLDV: 72.1 % — SIGNIFICANT CHANGE UP
SARS-COV-2 RNA SPEC QL NAA+PROBE: SIGNIFICANT CHANGE UP
SODIUM SERPL-SCNC: 122 MMOL/L — LOW (ref 135–145)
SODIUM SERPL-SCNC: 124 MMOL/L — LOW (ref 135–145)
SODIUM SERPL-SCNC: 125 MMOL/L — LOW (ref 135–145)
SODIUM UR-SCNC: 103 MMOL/L — SIGNIFICANT CHANGE UP
SP GR SPEC: 1.02 — SIGNIFICANT CHANGE UP (ref 1–1.05)
TROPONIN T, HIGH SENSITIVITY RESULT: 20 NG/L — SIGNIFICANT CHANGE UP
TROPONIN T, HIGH SENSITIVITY RESULT: 28 NG/L — SIGNIFICANT CHANGE UP
TROPONIN T, HIGH SENSITIVITY RESULT: 34 NG/L — SIGNIFICANT CHANGE UP
TSH SERPL-MCNC: 2.49 UIU/ML — SIGNIFICANT CHANGE UP (ref 0.27–4.2)
URATE SERPL-MCNC: 4.2 MG/DL — SIGNIFICANT CHANGE UP (ref 2.5–7)
UROBILINOGEN FLD QL: SIGNIFICANT CHANGE UP
UUN UR-MCNC: 240 MG/DL — SIGNIFICANT CHANGE UP
WBC # BLD: 13.65 K/UL — HIGH (ref 3.8–10.5)
WBC # FLD AUTO: 13.65 K/UL — HIGH (ref 3.8–10.5)
WBC UR QL: 0 /HPF — SIGNIFICANT CHANGE UP (ref 0–5)

## 2021-11-23 PROCEDURE — 93010 ELECTROCARDIOGRAM REPORT: CPT

## 2021-11-23 PROCEDURE — 99285 EMERGENCY DEPT VISIT HI MDM: CPT | Mod: 25

## 2021-11-23 PROCEDURE — 99223 1ST HOSP IP/OBS HIGH 75: CPT | Mod: GC

## 2021-11-23 RX ORDER — DEXTROSE 50 % IN WATER 50 %
25 SYRINGE (ML) INTRAVENOUS ONCE
Refills: 0 | Status: DISCONTINUED | OUTPATIENT
Start: 2021-11-23 | End: 2021-11-29

## 2021-11-23 RX ORDER — HALOPERIDOL DECANOATE 100 MG/ML
2.5 INJECTION INTRAMUSCULAR ONCE
Refills: 0 | Status: DISCONTINUED | OUTPATIENT
Start: 2021-11-23 | End: 2021-11-23

## 2021-11-23 RX ORDER — NIFEDIPINE 30 MG
30 TABLET, EXTENDED RELEASE 24 HR ORAL
Refills: 0 | Status: DISCONTINUED | OUTPATIENT
Start: 2021-11-23 | End: 2021-11-29

## 2021-11-23 RX ORDER — ONDANSETRON 8 MG/1
4 TABLET, FILM COATED ORAL ONCE
Refills: 0 | Status: COMPLETED | OUTPATIENT
Start: 2021-11-23 | End: 2021-11-23

## 2021-11-23 RX ORDER — ISOSORBIDE MONONITRATE 60 MG/1
30 TABLET, EXTENDED RELEASE ORAL DAILY
Refills: 0 | Status: DISCONTINUED | OUTPATIENT
Start: 2021-11-23 | End: 2021-11-29

## 2021-11-23 RX ORDER — CLOPIDOGREL BISULFATE 75 MG/1
75 TABLET, FILM COATED ORAL DAILY
Refills: 0 | Status: DISCONTINUED | OUTPATIENT
Start: 2021-11-23 | End: 2021-11-24

## 2021-11-23 RX ORDER — LIPASE/PROTEASE/AMYLASE 16-48-48K
1 CAPSULE,DELAYED RELEASE (ENTERIC COATED) ORAL
Refills: 0 | Status: DISCONTINUED | OUTPATIENT
Start: 2021-11-23 | End: 2021-11-29

## 2021-11-23 RX ORDER — HALOPERIDOL DECANOATE 100 MG/ML
2.5 INJECTION INTRAMUSCULAR ONCE
Refills: 0 | Status: COMPLETED | OUTPATIENT
Start: 2021-11-23 | End: 2021-11-23

## 2021-11-23 RX ORDER — MAGNESIUM SULFATE 500 MG/ML
2 VIAL (ML) INJECTION ONCE
Refills: 0 | Status: COMPLETED | OUTPATIENT
Start: 2021-11-23 | End: 2021-11-23

## 2021-11-23 RX ORDER — ASPIRIN/CALCIUM CARB/MAGNESIUM 324 MG
81 TABLET ORAL DAILY
Refills: 0 | Status: DISCONTINUED | OUTPATIENT
Start: 2021-11-23 | End: 2021-11-29

## 2021-11-23 RX ORDER — FAMOTIDINE 10 MG/ML
20 INJECTION INTRAVENOUS DAILY
Refills: 0 | Status: DISCONTINUED | OUTPATIENT
Start: 2021-11-23 | End: 2021-11-29

## 2021-11-23 RX ORDER — DEXTROSE 50 % IN WATER 50 %
15 SYRINGE (ML) INTRAVENOUS ONCE
Refills: 0 | Status: DISCONTINUED | OUTPATIENT
Start: 2021-11-23 | End: 2021-11-29

## 2021-11-23 RX ORDER — GLUCAGON INJECTION, SOLUTION 0.5 MG/.1ML
1 INJECTION, SOLUTION SUBCUTANEOUS ONCE
Refills: 0 | Status: DISCONTINUED | OUTPATIENT
Start: 2021-11-23 | End: 2021-11-29

## 2021-11-23 RX ORDER — INSULIN LISPRO 100/ML
VIAL (ML) SUBCUTANEOUS
Refills: 0 | Status: DISCONTINUED | OUTPATIENT
Start: 2021-11-23 | End: 2021-11-24

## 2021-11-23 RX ORDER — INSULIN GLARGINE 100 [IU]/ML
10 INJECTION, SOLUTION SUBCUTANEOUS AT BEDTIME
Refills: 0 | Status: DISCONTINUED | OUTPATIENT
Start: 2021-11-23 | End: 2021-11-24

## 2021-11-23 RX ORDER — SODIUM CHLORIDE 9 MG/ML
1000 INJECTION, SOLUTION INTRAVENOUS
Refills: 0 | Status: DISCONTINUED | OUTPATIENT
Start: 2021-11-23 | End: 2021-11-29

## 2021-11-23 RX ORDER — ASPIRIN/CALCIUM CARB/MAGNESIUM 324 MG
162 TABLET ORAL ONCE
Refills: 0 | Status: COMPLETED | OUTPATIENT
Start: 2021-11-23 | End: 2021-11-23

## 2021-11-23 RX ORDER — DEXTROSE 50 % IN WATER 50 %
12.5 SYRINGE (ML) INTRAVENOUS ONCE
Refills: 0 | Status: DISCONTINUED | OUTPATIENT
Start: 2021-11-23 | End: 2021-11-29

## 2021-11-23 RX ORDER — FOLIC ACID 0.8 MG
1 TABLET ORAL DAILY
Refills: 0 | Status: DISCONTINUED | OUTPATIENT
Start: 2021-11-23 | End: 2021-11-29

## 2021-11-23 RX ORDER — DORZOLAMIDE HYDROCHLORIDE 20 MG/ML
2 SOLUTION/ DROPS OPHTHALMIC EVERY 8 HOURS
Refills: 0 | Status: DISCONTINUED | OUTPATIENT
Start: 2021-11-23 | End: 2021-11-29

## 2021-11-23 RX ORDER — SODIUM CHLORIDE 9 MG/ML
500 INJECTION INTRAMUSCULAR; INTRAVENOUS; SUBCUTANEOUS ONCE
Refills: 0 | Status: COMPLETED | OUTPATIENT
Start: 2021-11-23 | End: 2021-11-23

## 2021-11-23 RX ORDER — ONDANSETRON 8 MG/1
4 TABLET, FILM COATED ORAL DAILY
Refills: 0 | Status: DISCONTINUED | OUTPATIENT
Start: 2021-11-23 | End: 2021-11-29

## 2021-11-23 RX ADMIN — Medication 50 GRAM(S): at 19:53

## 2021-11-23 RX ADMIN — Medication 162 MILLIGRAM(S): at 06:35

## 2021-11-23 RX ADMIN — INSULIN GLARGINE 10 UNIT(S): 100 INJECTION, SOLUTION SUBCUTANEOUS at 23:02

## 2021-11-23 RX ADMIN — SODIUM CHLORIDE 500 MILLILITER(S): 9 INJECTION INTRAMUSCULAR; INTRAVENOUS; SUBCUTANEOUS at 10:00

## 2021-11-23 RX ADMIN — HALOPERIDOL DECANOATE 2.5 MILLIGRAM(S): 100 INJECTION INTRAMUSCULAR at 07:53

## 2021-11-23 RX ADMIN — HALOPERIDOL DECANOATE 2.5 MILLIGRAM(S): 100 INJECTION INTRAMUSCULAR at 08:03

## 2021-11-23 RX ADMIN — ONDANSETRON 4 MILLIGRAM(S): 8 TABLET, FILM COATED ORAL at 04:15

## 2021-11-23 NOTE — ED ADULT NURSE NOTE - NSIMPLEMENTINTERV_GEN_ALL_ED
Implemented All Fall with Harm Risk Interventions:  [place] to call system. Call bell, personal items and telephone within reach. Instruct patient to call for assistance. Room bathroom lighting operational. Non-slip footwear when patient is off stretcher. Physically safe environment: no spills, clutter or unnecessary equipment. Stretcher in lowest position, wheels locked, appropriate side rails in place. Provide visual cue, wrist band, yellow gown, etc. Monitor gait and stability. Monitor for mental status changes and reorient to person, place, and time. Review medications for side effects contributing to fall risk. Reinforce activity limits and safety measures with patient and family. Provide visual clues: red socks.

## 2021-11-23 NOTE — ED ADULT NURSE REASSESSMENT NOTE - NS ED NURSE REASSESS COMMENT FT1
Assisted patient to use bedpan, Patient applied a green cream on her perineal and sacral area for her sores. Unable to visualise the sores due to the green cream. Urine collected which has green discolouration. Specimen sent to lab.

## 2021-11-23 NOTE — H&P ADULT - PROBLEM SELECTOR PLAN 7
SBP 140s in ED, as high as >170 p/t assessment by admitting team. Ultimately , goal would likely be SBP < 150 in an ideal world, but given she may be pain, would not aggressively treat right now. OP regimen is quite extensive, but wouldn't re-start it all right away.  - CONTINUE Home Nifedipine XL 30mg PO BID  - CONTINUE Home Isosorbide mononitrate ER (IMDUR) 30mg PO QD  - HOLDing Home Clonidine 0.1mg PO BID (unclear problem)  - HOLDing Home Enalapril-HCTZ 10mg-25mg PO QD (given hyponatremia)  - HOLDing Home Hydralazine 50mg PO TID

## 2021-11-23 NOTE — ED PROVIDER NOTE - NS ED ROS FT
General: denies fever, chills  HENT: denies nasal congestion, rhinorrhea  Eyes: denies visual changes, blurred vision  CV: chest pain,  Resp: denies difficulty breathing, cough  Abdominal: nausea + abdominal pain  : denies urinary pain or discharge  MSK: denies muscle aches, leg swelling  Neuro: denies headaches, numbness, tingling  Skin: denies rashes, bruises

## 2021-11-23 NOTE — H&P ADULT - NSHPPHYSICALEXAM_GEN_ALL_CORE
GEN: Awake, AOx2, NAD.  HEENT: NCAT  ---EYES: no scleral icterus, EOMI  CARDIO: RRR.   RESP: CTAB, no w/r/r  ABD: Soft, NTND.   MSK: No obvious deformity or ROM deficit. 2+ pulses x4. No edema.  SKIN: Warm, dry. No rashes. Nail beds without cyanosis or clubbing.  NEURO: Moves all four extremities spontaneously  PSYCH: Anxious mood, affect; lethargic-appearing after haldol GEN: Awake, AOx2, NAD.  HEENT: NCAT  ---EYES: no scleral icterus, EOMI  CARDIO: RRR.   RESP: CTAB, no w/r/r  ABD: Minimal distention, pt declines further examination   MSK: No obvious deformity or ROM deficit.   SKIN: Warm, dry. No rashes.  NEURO: Moves all four extremities spontaneously  PSYCH: Anxious mood, affect; lethargic-appearing after haldol

## 2021-11-23 NOTE — H&P ADULT - NSHPOUTPATIENTPROVIDERS_GEN_ALL_CORE
Endocrine: Dr. RONNIE NICOLE ([facility])  Cardiology: Dr. Bradley ([facility]) [phone]

## 2021-11-23 NOTE — H&P ADULT - PROBLEM SELECTOR PLAN 8
History by documentation, unclear if prescribed a statin or other lipid-lowering medication as an OP based on pharmacy fill history.  - Lipid Panel in am

## 2021-11-23 NOTE — H&P ADULT - PROBLEM SELECTOR PLAN 9
Has CT-identified, old, Right-sided rib fractures. Unclear baseline gait; given haldol administration on admission, would wait to assess until pt more alert/oriented.  - Fall Precautions  - PT eval

## 2021-11-23 NOTE — H&P ADULT - ATTENDING COMMENTS
Admitted for chest pain/abdominal pain, comfortable at time of visit, resting, sitter at bedside in ER  Troponin's within normal range with no acute ST/T changes on EKG  Abd CT non-revealing  F/up TTE, seen by Cards-recs noted  Check dysphagia screen  Found to have hyponatremia, likely hypovolemic in setting of diuretic use (HCTZ/furosemide), will hold diuretics for now and f/up BMP, slow correction  Obtain accurate med rec  Will resume nifedipine/imdur for HTN  Endocrine to see patient as known to them for borderline controlled DM, c/w ISS for now, lantus 10U qhs  Dysphagia screen before starting PO diet  Leukocytosis likely reactive, will f/up CBC, no e/o infection, no need to start Abx  Given age seems reasonable that patient goes back to friendly and familiar environment soon  Rest as above Admitted for chest pain/abdominal pain, comfortable at time of visit, resting, sitter at bedside in ER  Troponin's within normal range with no acute ST/T changes on EKG  Abd CT non-revealing  F/up TTE, seen by Cards-recs noted  Check dysphagia screen  Found to have hyponatremia, likely hypovolemic in setting of diuretic use (HCTZ/furosemide), will hold diuretics for now and f/up BMP, slow correction  Obtain accurate med rec  Will resume nifedipine/imdur for HTN  Endocrine to see patient as known to them for borderline controlled DM, c/w ISS for now, lantus 10U qhs  CKD stage 3  Dysphagia screen before starting PO diet  Leukocytosis likely reactive, will f/up CBC, no e/o infection, no need to start Abx  Dementia at baseline  Given age seems reasonable that patient goes back to friendly and familiar environment soon  Rest as above

## 2021-11-23 NOTE — H&P ADULT - NSHPLABSRESULTS_GEN_ALL_CORE
LABS:                         11.1   13.65 )-----------( 342      ( 2021 04:34 )             32.4     11    124<L>  |  89<L>  |  14  ----------------------------<  230<H>  3.7   |  22  |  0.93    Ca    8.9      2021 10:08    TPro  6.8  /  Alb  3.7  /  TBili  0.5  /  DBili  x   /  AST  22  /  ALT  8   /  AlkPhos  84  11-23    PT/INR - ( 2021 04:34 )   PT: 12.0 sec;   INR: 1.05 ratio         PTT - ( 2021 04:34 )  PTT:26.7 sec  Urinalysis Basic - ( 2021 07:20 )    Color: Light Green / Appearance: Clear / S.022 / pH: x  Gluc: x / Ketone: Negative  / Bili: Negative / Urobili: <2 mg/dL   Blood: x / Protein: Trace / Nitrite: Negative   Leuk Esterase: Negative / RBC: 12 /HPF / WBC 0 /HPF   Sq Epi: x / Non Sq Epi: 1 /HPF / Bacteria: Negative    MICROBIOLOGY  N/A    IMAGING  < from: CT Abdomen and Pelvis w/ IV Cont (21 @ 06:35) >  FINDINGS:  LOWER CHEST: Bibasilar subsegmental atelectasis.    LIVER: Subcentimeter hypodense lesions which are too small for accurate characterization. Parenchymal calcifications likely due to prior granulomatous disease.  BILE DUCTS: Normal caliber.  GALLBLADDER: Within normal limits.  SPLEEN: Indeterminate 1.7 cm low-attenuation lesion. Multiple parenchymal calcifications likely due to prior granulomatous disease.  PANCREAS: Within normal limits.  ADRENALS: Within normal limits.  KIDNEYS/URETERS: Bilateral renal subcentimeter hypodense lesions which are too small for accurate characterization. No hydronephrosis.    BLADDER: Within normal limits.  REPRODUCTIVE ORGANS: Atrophic uterus. No adnexal masses.    BOWEL: Moderate-sized hiatal hernia. No bowel obstruction or inflammation. Scattered sigmoid diverticulosis without diverticulitis. Appendix is normal.  PERITONEUM: No ascites.  VESSELS: Within normal limits.  RETROPERITONEUM/LYMPH NODES: No lymphadenopathy.  ABDOMINAL WALL: Anterior abdominal wall surgical clips.  BONES: Multiple old right rib fractures. Degenerative changes of the spine. Partially visualized left hip gamma nail.    IMPRESSION:  No bowel obstruction or inflammation.  < end of copied text >    < from: Xray Chest 1 View- PORTABLE-Urgent (21 @ 04:07) >    FINDINGS:  The lungs are clear.  There is no pleuraleffusion or pneumothorax.  The heart size is not well evaluated on this projection.  The visualized osseous and soft tissue structures demonstrate no acute pathology.    IMPRESSION:  Clear lungs.  < end of copied text >    CARDIOLOGY  < from: 12 Lead ECG (21 @ 01:04) >  Ventricular Rate 75 BPM  Atrial Rate 75 BPM  P-R Interval 172 ms  QRS Duration 130 ms  Q-T Interval 412 ms  QTC Calculation(Bazett) 460 ms  P Axis 44 degrees  R Axis -45 degrees  T Axis 94 degrees  Diagnosis Line Normal sinus rhythm  Left axis deviation  Left ventricular hypertrophy with QRS widening and repolarization abnormality  Abnormal ECG

## 2021-11-23 NOTE — ED PROVIDER NOTE - PROGRESS NOTE DETAILS
Pt had a fall at 730am as she was trying to get out of bed, no injuries on reassessment, now on 1:1 and s/p 5 haldol. pt more calm at this time. tba for hyponatremia and chest pain

## 2021-11-23 NOTE — H&P ADULT - PROBLEM SELECTOR PLAN 2
Pt has non-specific abdominal pain with imaging notable for hiatal hernia and scattered, ? old granulomatous disease. UA shows microscopic hematuria w/o WBC, bacteria, LE, or nitrates, which is a/w a broad differential that includes trauma (less likely but reportedly may have fallen), nephrolithiasis (which may also cause abd pain, n/v).   DDx:  - Pancreatitis?: has creon prescribed as OP by [employer]/Pharm Med rec; given this, wondering if pancreas may be involved  - Nephrolithiasis: microscopic hematuria w/ abd pain/n/v; no stone identified on CT; no hydronephrosis  - Trauma: less likely but has rib fx (old) on CT; no new pathology identified  - SBO: h/o abdominal surgery but CT w/o obstruction, pt no longer vomiting; less likely   - Mesentric Ischemia: elderly, unknown cardiac hx but not on AC or BB, so less likely AFib with risk factor for embolic disease, but exam relatively benign compared to history. Still lactate not elevated, pt w/o hemodynamic instability. Less likely.    Diagnostics  [ ] Lipase    Therapeutics  - C/W Ondansetron 4mg PO Q6H PRN

## 2021-11-23 NOTE — H&P ADULT - PROBLEM SELECTOR PLAN 3
Hyponatremia to 122 on presentation. SOsm 270 c/w hypoosmotic hyponatremia. Given history of multiple diuretics (prescribed for unclear etiology at this time), likely hypovolemic hyponatremia from renal losses in this context (especially given inappropriately sodium rich urine - Curly > 100). UOsm in this context inappropriately concentrated > SOsm, which could indicated inappropriate RASS/ADH activation ico hypovolemia vs SIADH/poor intake. Less likely SIADH with SUric acid >4.    Dx  - CTM BMP Na levels: Goal Na 128-130 by 0200 11/24/2021    Rx  - S/P 0.5L NS in ED  - would monitor closely, hold diuretics given unclear contribution of hyponatremia to mental status (though not obviously altered aside from not understanding why she's in the hospital)  - no further fluid resuscitation at this time given unclear contribution of cardiac history / trop elevations

## 2021-11-23 NOTE — ED ADULT NURSE NOTE - OBJECTIVE STATEMENT
Beny RN: Received patient to room 19 for chest pain. Primarily Kazakh speaking, MD at bedside with ipad for translation, pt partially hard of hearing, c/o non radiating chest pain for couple hours and constipation. Abdomen soft and nondistended, pt endorsing nausea. RR even and unlabored, left 20G hand placed, labs sent. Beny RN: Received patient to room 19 for chest pain. Primarily Uruguayan speaking, MD at bedside with ipad for translation, pt partially hard of hearing, A&Ox4, c/o non radiating chest pain for couple hours and constipation. Abdomen soft and nondistended, pt endorsing nausea. RR even and unlabored, left 20G hand placed, labs sent.

## 2021-11-23 NOTE — H&P ADULT - PROBLEM SELECTOR PLAN 1
Pt presents with CP of variable timeline (based on pt vs collateral history anywhere from 1d - 1 month). Trop rising in ED (with change > 6), ECG with LBBB (unclear if new or old w/o records; w/o meeting Scarbossa criteria) in this context c/f acute coronary syndrome (NSTEMI Type 1 vs Type 2). May also consider intra-abdominal pathology with referred pain given h/o hiatal hernia, but this would not explain uptrending troponin.  Dx  - TTE, Trend Trop to Peak  - Repeat ECG  - Cardiology C/S, Appreciate Recs    Rx:  - CONTINUE Home ASA 81mg PO QD (s/p addl 162 in ED)  - CONTINUE Home Clopidogrel 75mg PO QD (unclear indication, but would not stop w/o more info)  - HOLDing Home ACEi/HCTZ d/t Hyponatremia as below  - HOLDing Home Furosemide 20mg PO QD (unclear EDW, ZEUS)

## 2021-11-23 NOTE — H&P ADULT - PROBLEM SELECTOR PLAN 10
Hospital Bundle  Fluids: S/P 0.5L NS, PO ad michael  Electrolytes: Replete K > 4, Mg > 2, Phos > 3  Nutrition: Diet DASH/TLC (If passes dysphagia screening)  PPX  ---VTE: Held ico ?proceduralization (cards?) & DAPT  ---GI: Home famotidine 20mg  ---Resp: I/S  Access: PIV  Code Status: FULL CODE  Dispo: Pending medical stabilization, further evaluation

## 2021-11-23 NOTE — ED ADULT TRIAGE NOTE - CHIEF COMPLAINT QUOTE
# [id number]: AAOx4, constipation x 3 days with n/v/d, + flatus, woke up with CP x 2 hours ago, hx DMT2-- , EKG in progress #[id number]: AAOx4, constipation x 3 days with n/v/d, + flatus, woke up with CP x 2 hours ago, hx DMT2-- , EKG in progress #[id number]: AAOx4, constipation x 3 days with n/v, + flatus, woke up with CP x 2 hours ago, hx DMT2-- , EKG in progress

## 2021-11-23 NOTE — ED PROVIDER NOTE - OBJECTIVE STATEMENT
89yo F Azerbaijani speaking w/ history of IDDM, HTN and HLD coming in w/ 89yo F Cook Islander speaking w/ history of IDDM, HTN and HLD coming in w/ worsening substernal chest pain x1 week that acutely worsened last night, prompting patient to come to ED. Initially thought it was reflux and was taking omeprazole w/ no symptomatic improvement. Symptoms associated w/ nausea overnight but no SOB or exertional component. also notes abdominal pain that patient reports is secondary to "constipation". Last BM was yesterday, however and has had associated loose bowels. Otherwise has been in her normal state of health; denies fevers, chills, headaches or changes in vision. 89yo F Indian speaking w/ history of IDDM, HTN and HLD coming in w/ worsening substernal chest pain x1 week that acutely worsened last night, prompting patient to come to ED. Initially thought it was reflux and was taking omeprazole w/ no symptomatic improvement. Symptoms associated w/ nausea overnight but no SOB or exertional component. also notes abdominal pain that patient reports is secondary to "constipation". Last BM was yesterday, however and has had associated loose bowels. Otherwise has been in her normal state of health; denies fevers, chills, headaches or changes in vision.   #[id number] Tye

## 2021-11-23 NOTE — H&P ADULT - ASSESSMENT
Ms. Estrada is a 89 year old Dutch-speaking woman with HTN, T2DM (A1c 8.2%), dementia (by report, NOS) who presents with acute on subacute chest pain with associated HASTINGS and abdominal pain whose differential is broad but includes ACS (incl. unstable angina vs NSTEMI), intra-abdominal pathologies (symptomatic worsening of hiatal hernia?). She is incidentally noted to have hyponatremia and afebrile leukocytosis as well.

## 2021-11-23 NOTE — H&P ADULT - PROBLEM SELECTOR PLAN 4
A1c 8.2%. Endo records in [facility] indicate difficult to control DM. Uses Tresiba 22U QHS, NISS at home.   Dx  - FSBS QID  - Weigh pt  - Endo C/S in am  Rx:  - START Glargine (Lantus) 10U QHS (given poor PO intake at this time, unclear weight)  - CATE

## 2021-11-23 NOTE — ED ADULT NURSE REASSESSMENT NOTE - NS ED NURSE REASSESS COMMENT FT1
pt appears calm , A/o x2-3 but forgetful and restless at times. Repeat bloodwork obtained and sent as ordered. Incontinent yellow urine in depends but skin to buttocks appears to have a bright green paint color to skin. Unable to wipe off even with bath wipes. 1cm x 2cm stage 2 ulcer noted to sacrum by gluteal fold. Pt denies any pain at present. Greenlandic  utilized at bedside using ipad. Fs= this am 237. Pt continued on C:O for risk for falls. NSR on tele monitor.

## 2021-11-23 NOTE — CONSULT NOTE ADULT - ASSESSMENT
Patient is a 89yo F with PMHx HTN, HLD, DM, who presents with chest discomfort and shortness of breath from home. Per collateral reports patient had been experiencing abdominal pain X 10 days, N/V x 3 days. Cards consulted for increasing troponin and LBBB on EKG. Patient seen and examined at the bedside. Currently in NAD and does not appear with active chest pain or SOB at this time. Tristanian  [id number] used at the bedside patient repeatedly saying she wants to go home. EKG with LBBB (unknown if new old) but without ST changes concerning for ischemia at this time. Case discussed in detail with Dr. Meneses.     -- Tele monitoring  -- Trend troponin and add-on CK/CK-MB  -- Repeat EKG eval for dynamic changes  -- Would not treat for ACS at this time  -- Recommend continue with ASA/Statin/Anti-hypertensives  -- Obtain baseline ECHO during admission  -- Cards to follow up in AM

## 2021-11-23 NOTE — ED ADULT NURSE NOTE - CHIEF COMPLAINT QUOTE
#[id number]: AAOx4, constipation x 3 days with n/v, + flatus, woke up with CP x 2 hours ago, hx DMT2-- , EKG in progress

## 2021-11-23 NOTE — PHARMACOTHERAPY INTERVENTION NOTE - COMMENTS
Medication history is incomplete. Medication history verified with [facility], however unable to verify with patient. Please call spectra [phone] if you have any questions.

## 2021-11-23 NOTE — ED PROVIDER NOTE - ATTENDING CONTRIBUTION TO CARE
89F lives alone, h/o DM HTN p/w intermittent CP x 1 week thoguht it was acid tried omeprazole until it got worse yesterday so decided to come in.  Malaise and nausea.  No fever or travel.  Exam benign.  EKG SR at 75 no jeremias no std  LBBB.  LVH.  TWI I aVL.  Pt belching and appears nauseous.  Abd somewhat distended, but not particularly tender.  Would check CT a/p eval for bowel obstruction, then admit r/o ACS.  VS:  unremarkable    GEN - malaise, belching;   A+O x2  HEAD - NC/AT     ENT - PEERL, EOMI, mucous membranes  dry, no discharge      NECK: Neck supple, non-tender without lymphadenopathy, no masses, no JVD  PULM - CTA b/l,  symmetric breath sounds  COR -  normal heart sounds    ABD - , mild distension, mild tenderness diffuse, soft,  BACK - no CVA tenderness, nontender spine     EXTREMS - no edema, no deformity, warm and well perfused    SKIN - no rash    or bruising      NEUROLOGIC - alert, face symmetric, speech fluent, sensation nl, motor no focal deficit.

## 2021-11-23 NOTE — ED PROVIDER NOTE - PHYSICAL EXAMINATION
GENERAL: well appearing in no acute distress, non-toxic appearing  HEAD: normocephalic, atraumatic  HENT: airway intact, neck supple  EYES: normal conjunctiva  CARDIAC: regular rate and rhythm, normal S1S2, no appreciable murmurs, 2+ pulses in UE/LE b/l  PULM: normal breath sounds, clear to ascultation bilaterally, no rales, rhonchi, wheezing  GI: abdomen mildly distended, soft, mildly tender throughout; no guarding, rebound tenderness  NEURO: no focal motor or sensory deficits,  AAOx3  MSK: no peripheral edema, no calf tenderness b/l  SKIN: well-perfused, extremities warm, no visible rashes  PSYCH: appropriate mood and affect

## 2021-11-23 NOTE — H&P ADULT - HISTORY OF PRESENT ILLNESS
Ms. Estrada is a 89F     Collateral History Obtained from [place] (Emergency Contact)      In the ED,  VS: T 98.0 | /83 | HR 80 | RR 16 | O2 100% on RA  PEx: AOx2; belching, abd soft, NT, mild distension  Labs: Trop 20 --> 28 |   Imaging: CT A/P without   ECG:   Micro:   Interventions:   Impression:        Ms. Estrada is a 89F     Collateral History Obtained from [place] (Emergency Contact)      In the ED,  VS: T 98.0 | /83 | HR 80 | RR 16 | O2 100% on RA  PEx: AOx2; belching, abd soft, NT, mild distension  Labs: Trop 20 --> 28 |   Imaging: CT A/P without   ECG: LVH with LBBB w/o >1mm concordant ST elevation/depression, w/o >5mm discordant ST elevation  Micro: RVP negative, UCx pending  Interventions: ASA 161mg x1 | Haldol 5mg (2.5mg x2) | 500cc NS | Zofran 4mg   Impression:        Ms. Estrada is a 89F     Collateral History Obtained from [place] (Emergency Contact)  She says that last night (11/22) at ~11pm, she received a call from the home health aid who was worried because Ms. Estrada was reporting significant chest pain and trouble breathing. They tried giving Nitroglycerin; however, the pain did not resolve significantly, so they called the ambulance to bring her to the hospital. [place] also reports that Ms. Estrada has been describing abdominal pain x10 days and nausea/vomiting for the last 3 days. The vomit has been reportedly NBNB (food contents).     In the ED,  VS: T 98.0 | /83 | HR 80 | RR 16 | O2 100% on RA  PEx: AOx2; belching, abd soft, NT, mild distension  Labs: Trop 20 --> 28 |   Imaging: CT A/P without   ECG: LVH with LBBB w/o >1mm concordant ST elevation/depression, w/o >5mm discordant ST elevation  Micro: RVP negative, UCx pending  Interventions: ASA 161mg x1 | Haldol 5mg (2.5mg x2) | 500cc NS | Zofran 4mg   Impression:        Ms. Estrada is a 89F with T2DM (A1c 8.2%), HTN, Dementia (by collateral report, NOS) who presents     Collateral History Obtained from [place] (Emergency Contact)  She says that last night (11/22) at ~11pm, she received a call from the home health aid who was worried because Ms. Estrada was reporting significant chest pain and trouble breathing. They tried giving Nitroglycerin; however, the pain did not resolve significantly, so they called the ambulance to bring her to the hospital. [place] also reports that Ms. Estrada has been describing abdominal pain x10 days and nausea/vomiting for the last 3 days. The vomit has been reportedly NBNB (food contents).     In the ED,  VS: T 98.0 | /83 | HR 80 | RR 16 | O2 100% on RA  PEx: AOx2; belching, abd soft, NT, mild distension  Labs: Trop 20 --> 28 |   Imaging: CT A/P without   ECG: LVH with LBBB w/o >1mm concordant ST elevation/depression, w/o >5mm discordant ST elevation  Micro: RVP negative, UCx pending  Interventions: ASA 161mg x1 | Haldol 5mg (2.5mg x2) | 500cc NS | Zofran 4mg   Impression: Chest Pain, Hyponatremia       Ms. Estrada is a Nicaraguan-speaking 89F with T2DM (A1c 8.2%), HTN, Dementia (by collateral report, NOS) who presents complaining of chest pressure, abdominal pain, and nausea for 1 week - 1 month intermittently. She says that she thought that it might be heartburn but it didn't go away. She endorses associated shortness of breath and has otherwise never had this problem before.     She describes some of her past history, mostly as relates to her family, but she has difficulty explaining details about her own medical history. She knows she has diabetes and says that she takes 18U Novolog with meals and 22 units of Tresiba. She does not otherwise further explain her medical history or medications that she takes.    Collateral History Obtained from [place] (Emergency Contact)  She says that last night (11/22) at ~11pm, she received a call from the home health aid who was worried because Ms. Estrada was reporting significant chest pain and trouble breathing. They tried giving Nitroglycerin; however, the pain did not resolve significantly, so they called the ambulance to bring her to the hospital. [place] also reports that Ms. Estrada has been describing abdominal pain x10 days and nausea/vomiting for the last 3 days. The vomit has been reportedly NBNB (food contents). [place] also says that Ms. Estrada has dementia but that they don't know what kind.    In the ED,  VS: T 98.0 | /83 | HR 80 | RR 16 | O2 100% on RA  PEx: AOx2; belching, abd soft, NT, mild distension  Labs: Trop 20 --> 28 |   Imaging: CT A/P without   ECG: LVH with LBBB w/o >1mm concordant ST elevation/depression, w/o >5mm discordant ST elevation  Micro: RVP negative, UCx pending  Interventions: ASA 161mg x1 | Haldol 5mg (2.5mg x2) | 500cc NS | Zofran 4mg   Impression: Chest Pain, Hyponatremia

## 2021-11-23 NOTE — H&P ADULT - NSHPREVIEWOFSYSTEMS_GEN_ALL_CORE
GEN: No fever, chills, night sweats, weight loss  EYES: No vision changes, irritation, itchiness  ENT: No ear pain, congestion, sore throat  RESP: No cough or trouble breathing  CARDIOVASCULAR: No chest pain or palpitations  GI: No nausea/vomiting, diarrhea, constipation  :  No change in urine output; no dysuria, hematuria, or discharge  MSK: No joint or muscle pain  SKIN: No rashes  NEURO: No headache; no abnormal movements; no numbness or tingling  Remainder negative, except as per the HPI GEN: No fever, chills, night sweats, weight loss  EYES: No vision changes, irritation, itchiness  ENT: No ear pain, congestion, sore throat  RESP: No cough or trouble breathing  CARDIOVASCULAR: as per HPI  GI: +N/V; no diarrhea, constipation  :  No change in urine output; no dysuria, hematuria, or discharge  MSK: No joint or muscle pain  SKIN: No rashes  NEURO: No headache; no abnormal movements; no numbness or tingling  Remainder negative, except as per the HPI

## 2021-11-23 NOTE — ED PROVIDER NOTE - CLINICAL SUMMARY MEDICAL DECISION MAKING FREE TEXT BOX
89yo F Luxembourger speaking w/ history of IDDM, HTN and HLD coming in w/ worsening substernal chest pain x1 week that acutely worsened last night; given age and medical comorbidities, will evaluate for ACS. Will consider infectious vs metabolic derangements + intraabdominal pathology. Unlikely dissection given longevity of symptoms but will consider angio studies if workup is largely unremarkable.

## 2021-11-23 NOTE — ED PROVIDER NOTE - IV ALTEPLASE ADMIN OUTSIDE HIDDEN
Cardiology called, reports that tilt table test needs to be ordered as case request.  New order placed.    
show

## 2021-11-23 NOTE — H&P ADULT - PROBLEM SELECTOR PLAN 5
Mild, normocytic anemia. Reportedly rx'd both famotidine & omeprazole. PPI increases risk for many things, particularly in elderly patients, and specifically can be a/w RONALD.  - Iron Studies  - CONTINUE Home Folate 1mg PO QD

## 2021-11-23 NOTE — H&P ADULT - NSHPSOCIALHISTORY_GEN_ALL_CORE
Lives alone in [place], has home health aide. Daughter is in [facility]    Used to work as a trauma surgeon    Denies smoking, EtOH, other substance use history Lives alone in [place], has home health aide. Daughter is in [facility] and will arrive on Thursday    Used to work as a trauma surgeon    Denies smoking, EtOH, other substance use history

## 2021-11-23 NOTE — ED ADULT NURSE REASSESSMENT NOTE - NS ED NURSE REASSESS COMMENT FT1
patient at baseline mental status, appears to be resting in bed comfortably, no complaints noted at this time. Breathing even and unlabored, pallor/diaphoresis not noted. Vital signs as charted. Denies chest pain, shortness of breath, nausea or vomiting. IV site clean dry and intact. All safety maintained, will continue to monitor.

## 2021-11-23 NOTE — H&P ADULT - PROBLEM SELECTOR PLAN 6
Mild leukocytosis w/o fever, tachycardia, or other SIRS symptoms. SIRS may present atypically, but N/L ratio 4.82, which may indicates mild stress vs normal. More valuable will be the trend. W/O other specific symptoms and otherwise non-infectious UA, CXR, would assume non-infectious etiology and observe off ABx.  - CIS  - CTM (trend NLR for better understanding of this pt's physiology)

## 2021-11-24 DIAGNOSIS — R41.82 ALTERED MENTAL STATUS, UNSPECIFIED: ICD-10-CM

## 2021-11-24 DIAGNOSIS — E11.65 TYPE 2 DIABETES MELLITUS WITH HYPERGLYCEMIA: ICD-10-CM

## 2021-11-24 LAB
ALBUMIN SERPL ELPH-MCNC: 3.3 G/DL — SIGNIFICANT CHANGE UP (ref 3.3–5)
ALP SERPL-CCNC: 92 U/L — SIGNIFICANT CHANGE UP (ref 40–120)
ALT FLD-CCNC: 8 U/L — SIGNIFICANT CHANGE UP (ref 4–33)
ANION GAP SERPL CALC-SCNC: 16 MMOL/L — HIGH (ref 7–14)
AST SERPL-CCNC: 17 U/L — SIGNIFICANT CHANGE UP (ref 4–32)
BASOPHILS # BLD AUTO: 0.04 K/UL — SIGNIFICANT CHANGE UP (ref 0–0.2)
BASOPHILS NFR BLD AUTO: 0.2 % — SIGNIFICANT CHANGE UP (ref 0–2)
BILIRUB SERPL-MCNC: 0.6 MG/DL — SIGNIFICANT CHANGE UP (ref 0.2–1.2)
BUN SERPL-MCNC: 16 MG/DL — SIGNIFICANT CHANGE UP (ref 7–23)
CALCIUM SERPL-MCNC: 8.8 MG/DL — SIGNIFICANT CHANGE UP (ref 8.4–10.5)
CHLORIDE SERPL-SCNC: 89 MMOL/L — LOW (ref 98–107)
CHOLEST SERPL-MCNC: 159 MG/DL — SIGNIFICANT CHANGE UP
CO2 SERPL-SCNC: 17 MMOL/L — LOW (ref 22–31)
COVID-19 NUCLEOCAPSID GAM AB INTERP: POSITIVE
COVID-19 NUCLEOCAPSID TOTAL GAM ANTIBODY RESULT: 44.6 INDEX — HIGH
COVID-19 SPIKE DOMAIN AB INTERP: POSITIVE
COVID-19 SPIKE DOMAIN ANTIBODY RESULT: >250 U/ML — HIGH
CREAT SERPL-MCNC: 0.84 MG/DL — SIGNIFICANT CHANGE UP (ref 0.5–1.3)
CULTURE RESULTS: SIGNIFICANT CHANGE UP
EOSINOPHIL # BLD AUTO: 0.02 K/UL — SIGNIFICANT CHANGE UP (ref 0–0.5)
EOSINOPHIL NFR BLD AUTO: 0.1 % — SIGNIFICANT CHANGE UP (ref 0–6)
GLUCOSE BLDC GLUCOMTR-MCNC: 170 MG/DL — HIGH (ref 70–99)
GLUCOSE BLDC GLUCOMTR-MCNC: 178 MG/DL — HIGH (ref 70–99)
GLUCOSE BLDC GLUCOMTR-MCNC: 243 MG/DL — HIGH (ref 70–99)
GLUCOSE BLDC GLUCOMTR-MCNC: 266 MG/DL — HIGH (ref 70–99)
GLUCOSE SERPL-MCNC: 248 MG/DL — HIGH (ref 70–99)
HCT VFR BLD CALC: 33.8 % — LOW (ref 34.5–45)
HDLC SERPL-MCNC: 50 MG/DL — LOW
HGB BLD-MCNC: 11.6 G/DL — SIGNIFICANT CHANGE UP (ref 11.5–15.5)
IANC: 18.96 K/UL — HIGH (ref 1.5–8.5)
IMM GRANULOCYTES NFR BLD AUTO: 1 % — SIGNIFICANT CHANGE UP (ref 0–1.5)
LIDOCAIN IGE QN: 15 U/L — SIGNIFICANT CHANGE UP (ref 7–60)
LIPID PNL WITH DIRECT LDL SERPL: 91 MG/DL — SIGNIFICANT CHANGE UP
LYMPHOCYTES # BLD AUTO: 1.78 K/UL — SIGNIFICANT CHANGE UP (ref 1–3.3)
LYMPHOCYTES # BLD AUTO: 8 % — LOW (ref 13–44)
MAGNESIUM SERPL-MCNC: 2.1 MG/DL — SIGNIFICANT CHANGE UP (ref 1.6–2.6)
MCHC RBC-ENTMCNC: 28.6 PG — SIGNIFICANT CHANGE UP (ref 27–34)
MCHC RBC-ENTMCNC: 34.3 GM/DL — SIGNIFICANT CHANGE UP (ref 32–36)
MCV RBC AUTO: 83.3 FL — SIGNIFICANT CHANGE UP (ref 80–100)
MONOCYTES # BLD AUTO: 1.11 K/UL — HIGH (ref 0–0.9)
MONOCYTES NFR BLD AUTO: 5 % — SIGNIFICANT CHANGE UP (ref 2–14)
NEUTROPHILS # BLD AUTO: 18.96 K/UL — HIGH (ref 1.8–7.4)
NEUTROPHILS NFR BLD AUTO: 85.7 % — HIGH (ref 43–77)
NON HDL CHOLESTEROL: 109 MG/DL — SIGNIFICANT CHANGE UP
NRBC # BLD: 0 /100 WBCS — SIGNIFICANT CHANGE UP
NRBC # FLD: 0 K/UL — SIGNIFICANT CHANGE UP
PHOSPHATE SERPL-MCNC: 3.1 MG/DL — SIGNIFICANT CHANGE UP (ref 2.5–4.5)
PLATELET # BLD AUTO: 359 K/UL — SIGNIFICANT CHANGE UP (ref 150–400)
POTASSIUM SERPL-MCNC: 4.7 MMOL/L — SIGNIFICANT CHANGE UP (ref 3.5–5.3)
POTASSIUM SERPL-SCNC: 4.7 MMOL/L — SIGNIFICANT CHANGE UP (ref 3.5–5.3)
PROCALCITONIN SERPL-MCNC: 0.16 NG/ML — HIGH (ref 0.02–0.1)
PROT SERPL-MCNC: 6.2 G/DL — SIGNIFICANT CHANGE UP (ref 6–8.3)
RBC # BLD: 4.06 M/UL — SIGNIFICANT CHANGE UP (ref 3.8–5.2)
RBC # FLD: 14.4 % — SIGNIFICANT CHANGE UP (ref 10.3–14.5)
SARS-COV-2 IGG+IGM SERPL QL IA: 44.6 INDEX — HIGH
SARS-COV-2 IGG+IGM SERPL QL IA: >250 U/ML — HIGH
SARS-COV-2 IGG+IGM SERPL QL IA: POSITIVE
SARS-COV-2 IGG+IGM SERPL QL IA: POSITIVE
SODIUM SERPL-SCNC: 122 MMOL/L — LOW (ref 135–145)
SPECIMEN SOURCE: SIGNIFICANT CHANGE UP
TRIGL SERPL-MCNC: 91 MG/DL — SIGNIFICANT CHANGE UP
TROPONIN T, HIGH SENSITIVITY RESULT: 32 NG/L — SIGNIFICANT CHANGE UP
WBC # BLD: 22.13 K/UL — HIGH (ref 3.8–10.5)
WBC # FLD AUTO: 22.13 K/UL — HIGH (ref 3.8–10.5)

## 2021-11-24 PROCEDURE — 99223 1ST HOSP IP/OBS HIGH 75: CPT | Mod: GC

## 2021-11-24 PROCEDURE — 99233 SBSQ HOSP IP/OBS HIGH 50: CPT | Mod: GC

## 2021-11-24 PROCEDURE — 71045 X-RAY EXAM CHEST 1 VIEW: CPT | Mod: 26

## 2021-11-24 PROCEDURE — 70450 CT HEAD/BRAIN W/O DYE: CPT | Mod: 26

## 2021-11-24 RX ORDER — CEFTRIAXONE 500 MG/1
1000 INJECTION, POWDER, FOR SOLUTION INTRAMUSCULAR; INTRAVENOUS ONCE
Refills: 0 | Status: COMPLETED | OUTPATIENT
Start: 2021-11-24 | End: 2021-11-24

## 2021-11-24 RX ORDER — INSULIN LISPRO 100/ML
6 VIAL (ML) SUBCUTANEOUS
Refills: 0 | Status: DISCONTINUED | OUTPATIENT
Start: 2021-11-24 | End: 2021-11-26

## 2021-11-24 RX ORDER — ERGOCALCIFEROL 1.25 MG/1
1 CAPSULE ORAL
Qty: 0 | Refills: 0 | DISCHARGE

## 2021-11-24 RX ORDER — AZITHROMYCIN 500 MG/1
500 TABLET, FILM COATED ORAL ONCE
Refills: 0 | Status: COMPLETED | OUTPATIENT
Start: 2021-11-24 | End: 2021-11-24

## 2021-11-24 RX ORDER — CEFTRIAXONE 500 MG/1
INJECTION, POWDER, FOR SOLUTION INTRAMUSCULAR; INTRAVENOUS
Refills: 0 | Status: DISCONTINUED | OUTPATIENT
Start: 2021-11-24 | End: 2021-11-29

## 2021-11-24 RX ORDER — BRINZOLAMIDE 10 MG/ML
1 SUSPENSION/ DROPS OPHTHALMIC
Qty: 0 | Refills: 0 | DISCHARGE

## 2021-11-24 RX ORDER — INSULIN GLARGINE 100 [IU]/ML
14 INJECTION, SOLUTION SUBCUTANEOUS AT BEDTIME
Refills: 0 | Status: DISCONTINUED | OUTPATIENT
Start: 2021-11-24 | End: 2021-11-26

## 2021-11-24 RX ORDER — ENOXAPARIN SODIUM 100 MG/ML
40 INJECTION SUBCUTANEOUS DAILY
Refills: 0 | Status: DISCONTINUED | OUTPATIENT
Start: 2021-11-24 | End: 2021-11-29

## 2021-11-24 RX ORDER — ISOSORBIDE MONONITRATE 60 MG/1
1 TABLET, EXTENDED RELEASE ORAL
Qty: 0 | Refills: 0 | DISCHARGE

## 2021-11-24 RX ORDER — AZITHROMYCIN 500 MG/1
TABLET, FILM COATED ORAL
Refills: 0 | Status: COMPLETED | OUTPATIENT
Start: 2021-11-24 | End: 2021-11-28

## 2021-11-24 RX ORDER — FOLIC ACID 0.8 MG
1 TABLET ORAL
Qty: 0 | Refills: 0 | DISCHARGE

## 2021-11-24 RX ORDER — FAMOTIDINE 10 MG/ML
1 INJECTION INTRAVENOUS
Qty: 0 | Refills: 0 | DISCHARGE

## 2021-11-24 RX ORDER — AZITHROMYCIN 500 MG/1
500 TABLET, FILM COATED ORAL EVERY 24 HOURS
Refills: 0 | Status: COMPLETED | OUTPATIENT
Start: 2021-11-25 | End: 2021-11-27

## 2021-11-24 RX ORDER — INSULIN LISPRO 100/ML
VIAL (ML) SUBCUTANEOUS
Refills: 0 | Status: DISCONTINUED | OUTPATIENT
Start: 2021-11-24 | End: 2021-11-29

## 2021-11-24 RX ORDER — CEFTRIAXONE 500 MG/1
1000 INJECTION, POWDER, FOR SOLUTION INTRAMUSCULAR; INTRAVENOUS EVERY 24 HOURS
Refills: 0 | Status: DISCONTINUED | OUTPATIENT
Start: 2021-11-25 | End: 2021-11-29

## 2021-11-24 RX ADMIN — Medication 81 MILLIGRAM(S): at 12:12

## 2021-11-24 RX ADMIN — CEFTRIAXONE 100 MILLIGRAM(S): 500 INJECTION, POWDER, FOR SOLUTION INTRAMUSCULAR; INTRAVENOUS at 13:25

## 2021-11-24 RX ADMIN — ENOXAPARIN SODIUM 40 MILLIGRAM(S): 100 INJECTION SUBCUTANEOUS at 16:49

## 2021-11-24 RX ADMIN — Medication 2: at 16:50

## 2021-11-24 RX ADMIN — INSULIN GLARGINE 14 UNIT(S): 100 INJECTION, SOLUTION SUBCUTANEOUS at 22:40

## 2021-11-24 RX ADMIN — Medication 4: at 11:38

## 2021-11-24 RX ADMIN — Medication 1 MILLIGRAM(S): at 12:12

## 2021-11-24 RX ADMIN — ISOSORBIDE MONONITRATE 30 MILLIGRAM(S): 60 TABLET, EXTENDED RELEASE ORAL at 12:12

## 2021-11-24 RX ADMIN — Medication 6: at 09:41

## 2021-11-24 RX ADMIN — AZITHROMYCIN 255 MILLIGRAM(S): 500 TABLET, FILM COATED ORAL at 23:18

## 2021-11-24 RX ADMIN — FAMOTIDINE 20 MILLIGRAM(S): 10 INJECTION INTRAVENOUS at 12:12

## 2021-11-24 RX ADMIN — DORZOLAMIDE HYDROCHLORIDE 2 DROP(S): 20 SOLUTION/ DROPS OPHTHALMIC at 23:18

## 2021-11-24 NOTE — PROGRESS NOTE ADULT - ASSESSMENT
Ms. Estrada is a 89 year old Panamanian-speaking woman with HTN, T2DM (A1c 8.2%), dementia (by report, NOS) who presents with acute on subacute chest pain with associated HASTINGS and abdominal pain whose differential is broad but includes ACS (incl. unstable angina vs NSTEMI), intra-abdominal pathologies (symptomatic worsening of hiatal hernia?). She is incidentally noted to have hyponatremia and afebrile leukocytosis as well.  Ms. Estrada is a 89 year old Vincentian-speaking woman with HTN, T2DM (A1c 8.2%), dementia (by report, NOS) who presents with acute on subacute chest pain with associated HASTINGS and abdominal pain whose differential is broad but includes ACS (incl. unstable angina vs NSTEMI), intra-abdominal pathologies (symptomatic worsening of hiatal hernia?). She may now also have altered mental status ico worsening leukocytosis despite continued lack of obvious etiology, so will be initiated on empiric antibiotics pending further evaluation.

## 2021-11-24 NOTE — PROGRESS NOTE ADULT - PROBLEM SELECTOR PLAN 9
Has CT-identified, old, Right-sided rib fractures. Unclear baseline gait; given haldol administration on admission, would wait to assess until pt more alert/oriented.  - Fall Precautions  - PT eval History by documentation, unclear if prescribed a statin or other lipid-lowering medication as an OP based on pharmacy fill history. LDL 91, HDL 50, TCholesterol 159, TAG 91. Not alarmingly abnormal, not obviously hyperlipidemic. Goal LDL with DM may be lower, but given her age, wouldn't initiate this necessarily (also high risk for polypharmacy).  - CTM

## 2021-11-24 NOTE — PROGRESS NOTE ADULT - PROBLEM SELECTOR PLAN 7
SBP 140s in ED, as high as >170 p/t assessment by admitting team. Ultimately , goal would likely be SBP < 150 in an ideal world, but given she may be pain, would not aggressively treat right now. OP regimen is quite extensive, but wouldn't re-start it all right away.  - CONTINUE Home Nifedipine XL 30mg PO BID  - CONTINUE Home Isosorbide mononitrate ER (IMDUR) 30mg PO QD  - HOLDing Home Clonidine 0.1mg PO BID (unclear problem)  - HOLDing Home Enalapril-HCTZ 10mg-25mg PO QD (given hyponatremia)  - HOLDing Home Hydralazine 50mg PO TID Leukocytosis worsened to 22 w/ NLR 10 (from 5), which is an uptrend. Appears to have waxing-waning AMS  Diagnostics  - MRSA Swab  - UCx (-), obtain ULegionella, UStrep Ag  - BCx x2  - CXR (d/t cough, leukocytosis)    Therapeutics  [] START CTX 1g IV QD x5d (for ?CAP) (11/24-)  [] START Azithromycin 500mg IV QD x3d (for ?CAP) (11/24-)  - CTM (trend NLR for better understanding of this pt's physiology)

## 2021-11-24 NOTE — PROGRESS NOTE ADULT - PROBLEM SELECTOR PLAN 4
A1c 8.2%. Endo records in HIE indicate difficult to control DM. Uses Tresiba 22U QHS, NISS at home.   Dx  - FSBS QID  - Weigh pt  - Endo C/S in am  Rx:  - START Glargine (Lantus) 10U QHS (given poor PO intake at this time, unclear weight)  - CATE Hyponatremia to 122 on presentation; 11/24 122 (corrects to 126 when accounting for BGL, so improving). SOsm 270 c/w hypoosmolar hyponatremia. Given history of multiple diuretics (prescribed for unclear etiology at this time), likely hypovolemic hyponatremia from renal losses in this context (especially given inappropriately sodium rich urine - Curly > 100). UOsm in this context inappropriately concentrated > SOsm, which could indicated inappropriate RASS/ADH activation ico hypovolemia vs SIADH/poor intake. Less likely SIADH with SUric acid >4.    Dx  - CTM BMP Na levels: Goal Na 128-130 by 0200 11/24/2021    Rx  - S/P 0.5L NS in ED  - would monitor closely, hold diuretics given unclear contribution of hyponatremia to mental status (though not obviously altered aside from not understanding why she's in the hospital)  - no further fluid resuscitation at this time given unclear contribution of cardiac history / trop elevations

## 2021-11-24 NOTE — PROGRESS NOTE ADULT - PROBLEM SELECTOR PLAN 2
Pt has non-specific abdominal pain with imaging notable for hiatal hernia and scattered, ? old granulomatous disease. UA shows microscopic hematuria w/o WBC, bacteria, LE, or nitrates, which is a/w a broad differential that includes trauma (less likely but reportedly may have fallen), nephrolithiasis (which may also cause abd pain, n/v).   DDx:  - Pancreatitis?: has creon prescribed as OP by [employer]/Pharm Med rec; given this, wondering if pancreas may be involved  - Nephrolithiasis: microscopic hematuria w/ abd pain/n/v; no stone identified on CT; no hydronephrosis  - Trauma: less likely but has rib fx (old) on CT; no new pathology identified  - SBO: h/o abdominal surgery but CT w/o obstruction, pt no longer vomiting; less likely   - Mesentric Ischemia: elderly, unknown cardiac hx but not on AC or BB, so less likely AFib with risk factor for embolic disease, but exam relatively benign compared to history. Still lactate not elevated, pt w/o hemodynamic instability. Less likely.    Diagnostics  [ ] Lipase    Therapeutics  - C/W Ondansetron 4mg PO Q6H PRN Pt presents with CP of variable timeline (based on pt vs collateral history anywhere from 1d - 1 month). Trop rising in ED (with change > 6), ECG with LBBB (unclear if new or old w/o records; w/o meeting [place] criteria) but without dynamic changes or continued uptrending troponin. Possibly some supply/demand mismatch (i.e. NSTEMI Type 2).  Dx  - TTE ordered  - Cardiology C/S, Appreciate Recs (follow-up when TTE returned)    Rx:  - CONTINUE Home ASA 81mg PO QD (s/p addl 162 in ED)  - DIScontinue Home Clopidogrel 75mg PO QD (unclear indication)  - HOLDing Home ACEi/HCTZ d/t Hyponatremia as below  - HOLDing Home Furosemide 20mg PO QD (unclear EDW, ZEUS)

## 2021-11-24 NOTE — CHART NOTE - NSCHARTNOTEFT_GEN_A_CORE
Collateral history obtained from Primary Care Provider (Jaylin Ken, identified by pharmacy fill history)    Reports that pt has HTN, HLD, DM, COVID-19 infection (2020), "cardiac history" but doesn't believe that Ms. gutierrez has had a stent or other cardiac proceduralization in quite some time. She notes that the pt had a mechanical fall in 2018 with Left-sided trochanteric fracture and SDH (not requiring surgical intervention). She says that the hiatal hernia is s/p repair. Ms. Gutierrez was reportedly seen by Dr. Ken last week and did not report any of the complaints that we're seeing here. At that time, Dr. Ken again reminded Ms. Gutierrez that she should not be overusing her diuretics (specifically furosemide).     Regarding dementia, she says that if the patient has dementia, it is "very very mild"; she does state that every time the patient has been hospitalized in the past, she develops fairly severe delirium.     Regarding her tropinemia, Dr. Ken reports a history of renal dysfunction.    Last Lab Results from 11/12/2021:  CBC  - Hgb 10.3  - WBC 11.0 (stable from previous in April)    BMP  - Na 130  - Cl 95  - K 4.8  - Cr 1.06    Obtained Daughter's (Bella's) phone number potentially: [phone] | [phone]    Sammy Salcedo MD PGY-1

## 2021-11-24 NOTE — PROGRESS NOTE ADULT - ATTENDING COMMENTS
Seen by me this afternoon, looking comfortable, not really following commands, noted cough during exam  Per team patient was altered earlier and with raise in leukocytosis concerned for possible pneumonia  CXR reviewed by me and per reading RLL opacity probably atelectasis  Metabolic encephalopathy possibly in the setting of underlying dementia and now with likely CAP  Starting ceftriaxone/zithromax  Will f/up Legionella Ag/BCx's and leukocytosis  HCT is pending  Raise in troponin could be due to demand ischemia not actual ACS, f/up TTE  Hyponatremia possibly due to outpatient diuretic use, now on hold and is slightly better today  Apprec Endocrine recs, increasing lantus to 14U qhs and lispro 6U TID now that diet has been started  HTN is acceptable, c/w imdur and nifedipine for now, holding diuretics as above  Seems to have underlying CKD stage 3  PT evaluation pending  Rest as above

## 2021-11-24 NOTE — PROGRESS NOTE ADULT - PROBLEM SELECTOR PLAN 8
History by documentation, unclear if prescribed a statin or other lipid-lowering medication as an OP based on pharmacy fill history.  - Lipid Panel in am SBP 140s in ED, as high as >170 p/t assessment by admitting team. Ultimately , goal would likely be SBP < 150 in an ideal world, but given she may be pain, would not aggressively treat right now. OP regimen is quite extensive, but wouldn't re-start it all right away. SBP fluctuant but within goal.  - CONTINUE Home Nifedipine XL 30mg PO BID  - CONTINUE Home Isosorbide mononitrate ER (IMDUR) 30mg PO QD  - HOLDing Home Clonidine 0.1mg PO BID (unclear problem)  - HOLDing Home Enalapril-HCTZ 10mg-25mg PO QD (given hyponatremia)  - HOLDing Home Hydralazine 50mg PO TID

## 2021-11-24 NOTE — PROGRESS NOTE ADULT - PROBLEM SELECTOR PLAN 5
Mild, normocytic anemia. Reportedly rx'd both famotidine & omeprazole. PPI increases risk for many things, particularly in elderly patients, and specifically can be a/w RONALD.  - Iron Studies  - CONTINUE Home Folate 1mg PO QD A1c 8.2%. Endo records in HIE indicate difficult to control DM. Uses Tresiba 22U QHS, NISS at home.   Dx  - FSBS QID  - Weigh pt  - Endo C/S, Appreciate Recs  Rx:  - C/W Glargine (Lantus) 10U QHS (given poor PO intake at this time, unclear weight)  - CATE

## 2021-11-24 NOTE — CONSULT NOTE ADULT - ATTENDING COMMENTS
89yo F with PMHx HTN, HLD, DM, who presents with chest discomfort and shortness of breath with chest discomfort and ?new LBBB w/o hs Trop elevations. Possibility of cardiomyopathy-will recommend TTE in AM. Agree presentation less likely c/w [id number] defer 2nd AP/anticoagulation noting advanced age and desire for conservative Rx as expressed by pt to  at this time. Recommend close FU w/ cardiology decide on further course after TTE done.
Ms. Estrada is a Sammarinese-speaking 89F with T2DM (A1c 8.2%), HTN, Dementia (by collateral report) who presents complaining of chest pressure, abdominal pain, and nausea for 1 week - 1 month intermittently. Endocrine consulted for T2DM management.  adjust basal bolus insulin as per fellow note  avoid hypoglycemia, goal -250 in this 89 year old with dementia  assess safe dc plan and if pt can safely self inject insulin and is able to safely address DM care at home

## 2021-11-24 NOTE — PROGRESS NOTE ADULT - PROBLEM SELECTOR PLAN 3
Hyponatremia to 122 on presentation. SOsm 270 c/w hypoosmotic hyponatremia. Given history of multiple diuretics (prescribed for unclear etiology at this time), likely hypovolemic hyponatremia from renal losses in this context (especially given inappropriately sodium rich urine - Curly > 100). UOsm in this context inappropriately concentrated > SOsm, which could indicated inappropriate RASS/ADH activation ico hypovolemia vs SIADH/poor intake. Less likely SIADH with SUric acid >4.    Dx  - CTM BMP Na levels: Goal Na 128-130 by 0200 11/24/2021    Rx  - S/P 0.5L NS in ED  - would monitor closely, hold diuretics given unclear contribution of hyponatremia to mental status (though not obviously altered aside from not understanding why she's in the hospital)  - no further fluid resuscitation at this time given unclear contribution of cardiac history / trop elevations Pt has non-specific abdominal pain with imaging notable for hiatal hernia and scattered, ? old granulomatous disease. UA shows microscopic hematuria w/o WBC, bacteria, LE, or nitrates, which is a/w a broad differential that includes trauma (less likely but reportedly may have fallen), nephrolithiasis (which may also cause abd pain, n/v).   DDx:  - Pancreatitis?: has creon prescribed as OP by [employer]/Pharm Med rec; given this, wondering if pancreas may be involved  - Nephrolithiasis: microscopic hematuria w/ abd pain/n/v; no stone identified on CT; no hydronephrosis  - Trauma: less likely but has rib fx (old) on CT; no new pathology identified  - SBO: h/o abdominal surgery but CT w/o obstruction, pt no longer vomiting; less likely   - Mesentric Ischemia: elderly, unknown cardiac hx but not on AC or BB, so less likely AFib with risk factor for embolic disease, but exam relatively benign compared to history. Still lactate not elevated, pt w/o hemodynamic instability. Less likely.    Diagnostics  [ ] Lipase    Therapeutics  - C/W Ondansetron 4mg PO Q6H PRN

## 2021-11-24 NOTE — PROGRESS NOTE ADULT - PROBLEM SELECTOR PLAN 6
Mild leukocytosis w/o fever, tachycardia, or other SIRS symptoms. SIRS may present atypically, but N/L ratio 4.82, which may indicates mild stress vs normal. More valuable will be the trend. W/O other specific symptoms and otherwise non-infectious UA, CXR, would assume non-infectious etiology and observe off ABx.  - CIS  - CTM (trend NLR for better understanding of this pt's physiology) Mild, normocytic anemia. Reportedly rx'd both famotidine & omeprazole. PPI increases risk for many things, particularly in elderly patients, and specifically can be a/w RONALD.  - Iron Studies  - CONTINUE Home Folate 1mg PO QD

## 2021-11-24 NOTE — PROGRESS NOTE ADULT - SUBJECTIVE AND OBJECTIVE BOX
Sammy Salcedo M.D.  [facility] PGY-1    PROGRESS NOTE:     Patient is a 89y old  Female who presents with a chief complaint of Chest Pain (2021 20:24)      -OVERNIGHT EVENTS-      -SUBJECTIVE-      MEDICATIONS  (STANDING):  aspirin enteric coated 81 milliGRAM(s) Oral daily  clopidogrel Tablet 75 milliGRAM(s) Oral daily  dextrose 40% Gel 15 Gram(s) Oral once  dextrose 5%. 1000 milliLiter(s) (50 mL/Hr) IV Continuous <Continuous>  dextrose 5%. 1000 milliLiter(s) (100 mL/Hr) IV Continuous <Continuous>  dextrose 50% Injectable 25 Gram(s) IV Push once  dextrose 50% Injectable 12.5 Gram(s) IV Push once  dextrose 50% Injectable 25 Gram(s) IV Push once  dorzolamide 2% Ophthalmic Solution 2 Drop(s) Both EYES every 8 hours  famotidine    Tablet 20 milliGRAM(s) Oral daily  folic acid 1 milliGRAM(s) Oral daily  glucagon  Injectable 1 milliGRAM(s) IntraMuscular once  insulin glargine Injectable (LANTUS) 10 Unit(s) SubCutaneous at bedtime  insulin lispro (ADMELOG) corrective regimen sliding scale   SubCutaneous three times a day before meals  isosorbide   mononitrate ER Tablet (IMDUR) 30 milliGRAM(s) Oral daily  NIFEdipine XL 30 milliGRAM(s) Oral two times a day  pancrelipase  (CREON 24,000 Lipase Units) 1 Capsule(s) Oral three times a day with meals    MEDICATIONS  (PRN):  ondansetron    Tablet 4 milliGRAM(s) Oral daily PRN Nausea and/or Vomiting      -OBJECTIVE-    CAPILLARY BLOOD GLUCOSE      POCT Blood Glucose.: 234 mg/dL (2021 22:51)  POCT Blood Glucose.: 208 mg/dL (2021 20:05)  POCT Blood Glucose.: 250 mg/dL (2021 13:25)  POCT Blood Glucose.: 237 mg/dL (2021 09:07)  POCT Blood Glucose.: 160 mg/dL (2021 08:58)  POCT Blood Glucose.: 236 mg/dL (2021 08:54)    I&O's Summary      VITAL SIGNS  Vital Signs Last 24 Hrs  T(C): 37.2 (2021 07:52), Max: 37.2 (2021 07:52)  T(F): 99 (2021 07:52), Max: 99 (2021 07:52)  HR: 96 (2021 07:52) (78 - 108)  BP: 147/50 (2021 07:52) (107/69 - 180/76)  BP(mean): --  RR: 18 (2021 07:52) (16 - 20)  SpO2: 100% (2021 07:52) (97% - 100%)    PHYSICAL EXAM:  GENERAL/CONSTITUTIONAL: NAD, Awake, AOx3 (person, place, time)  HEENT: NCAT  ---EYES: no scleral icterus, EOMI, PERRLA  ---ENMT: MMM,   ---NECK: No palpable masses; no thyromegaly  CARDIO: RRR. Normal S1/S2, no m/r/g.  RESP: Normal respiratory effort; CTAB, no w/r/r  ABD: Soft, NTND; +BS.   : No CVAT.   MSK: No obvious deformity or ROM deficit.  ---EXTREMITIES: No peripheral edema. Peripheral pulses 2+ x4.  SKIN: Warm, dry. No rashes.   NEURO: Moves all four extremities spontaneously  PSYCH: Appropriate mood & affect. No VH/AH. No SI/HI.    LABS:                        11.6   22.13 )-----------( 359      ( 2021 06:26 )             33.8     11-24    122<L>  |  89<L>  |  16  ----------------------------<  248<H>  4.7   |  17<L>  |  0.84    Ca    8.8      2021 06:26  Phos  3.1     11-24  Mg     2.10     11-24    TPro  6.2  /  Alb  3.3  /  TBili  0.6  /  DBili  x   /  AST  17  /  ALT  8   /  AlkPhos  92  11-24    PT/INR - ( 2021 04:34 )   PT: 12.0 sec;   INR: 1.05 ratio         PTT - ( 2021 04:34 )  PTT:26.7 sec      Urinalysis Basic - ( 2021 07:20 )    Color: Light Green / Appearance: Clear / S.022 / pH: x  Gluc: x / Ketone: Negative  / Bili: Negative / Urobili: <2 mg/dL   Blood: x / Protein: Trace / Nitrite: Negative   Leuk Esterase: Negative / RBC: 12 /HPF / WBC 0 /HPF   Sq Epi: x / Non Sq Epi: 1 /HPF / Bacteria: Negative        MICROBIOLOGY      IMAGING        COORDINATION OF CARE:  ---PCP:  ---Consultants:   Sammy Salcedo M.D.  [facility] PGY-1    PROGRESS NOTE:     Patient is a 89y old  Female who presents with a chief complaint of Chest Pain (2021 20:24)      -OVERNIGHT EVENTS-  NAEON  - Borderline febrile 99.2 rectally    -SUBJECTIVE-  S[place]i  (Claudia) #[id number]  Ms. Estrada is seen this morning in bed disrobed, pulling at her IV line and grasping at the air. With the , she merely points at her right ear and makes a constant "ooooo" sound. She does not specifically respond to any questions or follow commands.    MEDICATIONS  (STANDING):  aspirin enteric coated 81 milliGRAM(s) Oral daily  clopidogrel Tablet 75 milliGRAM(s) Oral daily  dextrose 40% Gel 15 Gram(s) Oral once  dextrose 5%. 1000 milliLiter(s) (50 mL/Hr) IV Continuous <Continuous>  dextrose 5%. 1000 milliLiter(s) (100 mL/Hr) IV Continuous <Continuous>  dextrose 50% Injectable 25 Gram(s) IV Push once  dextrose 50% Injectable 12.5 Gram(s) IV Push once  dextrose 50% Injectable 25 Gram(s) IV Push once  dorzolamide 2% Ophthalmic Solution 2 Drop(s) Both EYES every 8 hours  famotidine    Tablet 20 milliGRAM(s) Oral daily  folic acid 1 milliGRAM(s) Oral daily  glucagon  Injectable 1 milliGRAM(s) IntraMuscular once  insulin glargine Injectable (LANTUS) 10 Unit(s) SubCutaneous at bedtime  insulin lispro (ADMELOG) corrective regimen sliding scale   SubCutaneous three times a day before meals  isosorbide   mononitrate ER Tablet (IMDUR) 30 milliGRAM(s) Oral daily  NIFEdipine XL 30 milliGRAM(s) Oral two times a day  pancrelipase  (CREON 24,000 Lipase Units) 1 Capsule(s) Oral three times a day with meals    MEDICATIONS  (PRN):  ondansetron    Tablet 4 milliGRAM(s) Oral daily PRN Nausea and/or Vomiting      -OBJECTIVE-    CAPILLARY BLOOD GLUCOSE      POCT Blood Glucose.: 234 mg/dL (2021 22:51)  POCT Blood Glucose.: 208 mg/dL (2021 20:05)  POCT Blood Glucose.: 250 mg/dL (2021 13:25)  POCT Blood Glucose.: 237 mg/dL (2021 09:07)  POCT Blood Glucose.: 160 mg/dL (2021 08:58)  POCT Blood Glucose.: 236 mg/dL (2021 08:54)    I&O's Summary      VITAL SIGNS  Vital Signs Last 24 Hrs  T(C): 37.2 (2021 07:52), Max: 37.2 (2021 07:52)  T(F): 99 (2021 07:52), Max: 99 (2021 07:52)  HR: 96 (2021 07:52) (78 - 108)  BP: 147/50 (2021 07:52) (107/69 - 180/76)  BP(mean): --  RR: 18 (2021 07:52) (16 - 20)  SpO2: 100% (2021 07:52) (97% - 100%)    PHYSICAL EXAM:  GENERAL/CONSTITUTIONAL: NAD, Awake, AOx3 (person, place, time)  HEENT: NCAT  ---EYES: no scleral icterus, EOMI, PERRLA  ---ENMT: MMM  CARDIO: RRR. Normal S1/S2, no m/r/g.  RESP: Normal respiratory effort; CTAB, no w/r/r ; +cough noted during exam, non productive  ABD: Soft, NTND  : No CVAT.   MSK: No obvious deformity or ROM deficit.  ---EXTREMITIES: No peripheral edema. Peripheral pulses 2+ x4.  SKIN: Warm, dry. No rashes.   NEURO: Moves all four extremities spontaneously  PSYCH: Appropriate mood & affect.    LABS:                        11.6   22.13 )-----------( 359      ( 2021 06:26 )             33.8     11-24    122<L>  |  89<L>  |  16  ----------------------------<  248<H>  4.7   |  17<L>  |  0.84    Ca    8.8      2021 06:26  Phos  3.1     11-24  Mg     2.10     11-24    TPro  6.2  /  Alb  3.3  /  TBili  0.6  /  DBili  x   /  AST  17  /  ALT  8   /  AlkPhos  92  11-24    PT/INR - ( 2021 04:34 )   PT: 12.0 sec;   INR: 1.05 ratio         PTT - ( 2021 04:34 )  PTT:26.7 sec      Urinalysis Basic - ( 2021 07:20 )    Color: Light Green / Appearance: Clear / S.022 / pH: x  Gluc: x / Ketone: Negative  / Bili: Negative / Urobili: <2 mg/dL   Blood: x / Protein: Trace / Nitrite: Negative   Leuk Esterase: Negative / RBC: 12 /HPF / WBC 0 /HPF   Sq Epi: x / Non Sq Epi: 1 /HPF / Bacteria: Negative        MICROBIOLOGY      IMAGING        COORDINATION OF CARE:  ---PCP:  ---Consultants:

## 2021-11-24 NOTE — CONSULT NOTE ADULT - ASSESSMENT
Ms. Estrada is a [place] citizen-speaking 89F with T2DM (A1c 8.2%), HTN, Dementia (by collateral report) who presents complaining of chest pressure, abdominal pain, and nausea for 1 week - 1 month intermittently. Endocrine consulted for T2DM management.    Adequately controlled T2DM with hyperglycemia  A1c 8.2%  -Increase Lantus to 14 units at bedtime. DO NOT HOLD IF NPO.  -Start Admelog 6 units TID pre-meal. HOLD IF NPO.  -Use low correction scale pre-meal  -No correction scale at bedtime  -Fingerstick BG before meals and bedtime and 3am  -Goal -250 for this elderly patient with multiple medical comorbidities.   -Carbohydrate consistent diet  Discharge plan:  -Can discharge patient home on basal/bolus insulin if she is safe to self-inject insulin at home or has adequate help. May need social work/case management involvement. Final regimen pending clinical course.  -Recommend routine outpatient ophthalmology and endocrinology f/u. Sees Dr. Espinal for Endocrine.    HTN  -Outpatient goal BP <130/80. On Imdur and nifedipine. Management per primary team.    HLD  -Consider risks/benefits of statin use in this elderly patient  -Can check lipid profile if not done recently    Jorje Olmedo DO, Endocrinology Fellow  Pager [phone] from 9am to 5pm. After hours and on weekends, please call [phone]. Ms. Estrada is a [place]-speaking 89F with T2DM (A1c 8.2%), HTN, Dementia (by collateral report) who presents complaining of chest pressure, abdominal pain, and nausea for 1 week - 1 month intermittently. Endocrine consulted for T2DM management.    Adequately controlled T2DM with hyperglycemia  A1c 8.2%  -Increase Lantus to 14 units at bedtime. DO NOT HOLD IF NPO.  -Start Admelog 6 units TID pre-meal. HOLD IF NPO.  -Use low correction scale pre-meal  -No correction scale at bedtime  -Fingerstick BG before meals and bedtime and 3am  -Goal -250 for this elderly patient with multiple medical comorbidities and dementia- avoid hypoglycemia is the goal!!!  -Carbohydrate consistent diet  Discharge plan:  home regimen:  Per recent Endocrinology outpatient records (11/5/21): taking Tresiba 20-22 units qhs. Prescribed correction scale pre-meal as follows: before breakfast is to take 10-14-16-08-18-66-20-22 units Novolog; before lunch 9-10-12-14-15-16-17-18-20 and before dinner is to take 7-8-10-18-60-76-14-15 Novolog. Unclear what the BG cutoffs are for the scales. Apparently patient frequently missing pre-meal insulin if FS in low-mid 100s. No novolog at bedtime and encouraged to have bedtime snack.    -Can discharge patient home on basal/bolus insulin if she is safe to self-inject insulin at home or has adequate help.   Full plan TBD- would need to determine where patient is going, who will be assisting with care and able to give insulin injections.  Please call endocrine once this information has been obtained so that a safe dc plan can be decided and coordianted  [phone]. .  Concern that current clinical status will prohibit her from being able to do this.    reccomend social work/case management involvement. Final regimen pending clinical course.  -Recommend routine outpatient ophthalmology and endocrinology f/u. Sees Dr. Espinal for Endocrine.        HTN  -Outpatient goal BP <140/80 given age   On Imdur and nifedipine. Management per primary team.    HLD  -Consider risks/benefits of statin use in this elderly patient  -Can check lipid profile if not done recently    Jorje Olmedo DO, Endocrinology Fellow  Pager [phone] from 9am to 5pm. After hours and on weekends, please call [phone].

## 2021-11-24 NOTE — PROGRESS NOTE ADULT - PROBLEM SELECTOR PLAN 1
Pt presents with CP of variable timeline (based on pt vs collateral history anywhere from 1d - 1 month). Trop rising in ED (with change > 6), ECG with LBBB (unclear if new or old w/o records; w/o meeting Scarbossa criteria) in this context c/f acute coronary syndrome (NSTEMI Type 1 vs Type 2). May also consider intra-abdominal pathology with referred pain given h/o hiatal hernia, but this would not explain uptrending troponin.  Dx  - TTE, Trend Trop to Peak  - Repeat ECG  - Cardiology C/S, Appreciate Recs    Rx:  - CONTINUE Home ASA 81mg PO QD (s/p addl 162 in ED)  - CONTINUE Home Clopidogrel 75mg PO QD (unclear indication, but would not stop w/o more info)  - HOLDing Home ACEi/HCTZ d/t Hyponatremia as below  - HOLDing Home Furosemide 20mg PO QD (unclear EDW, ZEUS) Intermittent reports of agitation, not engaging with staff or with provider teams. By report, h/o dementia, but not clear the timeline for this. Most likely hospital delirium in an elderly patient with ?polypharmacy, but given symptomatology, also high risk for infectious and anatomic process. Other DDx includes:  - metabolic derangements (incl. hyponatremia, hyperglycemia - though neither is significantly abnormal enough that would cause significant symptoms)  - infectious/toxic metabolic encephalopathy (leukocytosis, cough, though no clear PNA on CXR, less likely abdominal but monitoring closely)  - less likely traumatic but other intracranial pathology could cause this as well    Diagnostics  - NC HCT  - infectious work-up as below  - hyponatremia, hyperglycemia management as below    Therapeutics  - Delirium Precuations (frequent re-orientation, using  every time, windows open during the day)

## 2021-11-24 NOTE — PROGRESS NOTE ADULT - PROBLEM SELECTOR PLAN 11
Hospital Bundle  Fluids: NPO except meds until dysphagia screening, then DASH   Electrolytes: Replete K > 4, Mg > 2, Phos > 3  Nutrition: Diet DASH/TLC (If passes dysphagia screening)  PPX  ---VTE: LVX  ---GI: Home famotidine 20mg  ---Resp: I/S  Access: PIV  Code Status: FULL CODE  Dispo: Pending medical stabilization, further evaluation  -- Called ?PCP Jaylin Ken [phone] (prescriber of meds based on fill history), left message for call back

## 2021-11-24 NOTE — PROGRESS NOTE ADULT - PROBLEM SELECTOR PLAN 10
Hospital Bundle  Fluids: S/P 0.5L NS, PO ad michael  Electrolytes: Replete K > 4, Mg > 2, Phos > 3  Nutrition: Diet DASH/TLC (If passes dysphagia screening)  PPX  ---VTE: Held ico ?proceduralization (cards?) & DAPT  ---GI: Home famotidine 20mg  ---Resp: I/S  Access: PIV  Code Status: FULL CODE  Dispo: Pending medical stabilization, further evaluation Has CT-identified, old, Right-sided rib fractures. Unclear baseline gait; given haldol administration on admission, would wait to assess until pt more alert/oriented.  - Fall Precautions  - PT eval

## 2021-11-24 NOTE — CONSULT NOTE ADULT - SUBJECTIVE AND OBJECTIVE BOX
HPI: Collateral History Obtained from [place] (Emergency Contact) from Hospitalist team  Patient is a 89yo F with PMHx HTN, HLD, DM, who presents with chest discomfort and shortness of breath. Patients emergency contact says that last night () at ~11pm, she received a call from the home health aid who was worried because Ms. Estrada was reporting significant chest pain and trouble breathing. They tried giving Nitroglycerin; however, the pain did not resolve significantly, so they called the ambulance to bring her to the hospital. [place] also reports that Ms. Estrada has been describing abdominal pain x10 days and nausea/vomiting for the last 3 days. The vomit has been reportedly NBNB (food contents). Patient apparently follows up with [facility].     Cardiology consulted for slowly rising troponin on labs 20-->28-->34. EKG obtained NSR with LBBB (unsure if old vs new) with no ST changes concerning for ischemia at this time. Patient seen and examined at the bedside. Seen lying flat sleeping in NAD. Vitals obtained at the bedside WNL. Georgian  [id number] used at the bedside. When asked if patient had any active chest pain or shortness of breath patient says that she wants to go home and to leave her alone. Unable to obtain further info from patient at this time with  assistance.       Allergies: No Known Allergies    MEDICATIONS  (STANDING):  aspirin enteric coated 81 milliGRAM(s) Oral daily  clopidogrel Tablet 75 milliGRAM(s) Oral daily  dorzolamide 2% Ophthalmic Solution 2 Drop(s) Both EYES every 8 hours  famotidine    Tablet 20 milliGRAM(s) Oral daily  folic acid 1 milliGRAM(s) Oral daily  isosorbide   mononitrate ER Tablet (IMDUR) 30 milliGRAM(s) Oral daily  NIFEdipine XL 30 milliGRAM(s) Oral two times a day  pancrelipase  (CREON 24,000 Lipase Units) 1 Capsule(s) Oral three times a day with meals    MEDICATIONS  (PRN):  ondansetron    Tablet 4 milliGRAM(s) Oral daily PRN Nausea and/or Vomiting    PAST MEDICAL & SURGICAL HISTORY:  Diabetes mellitus    Hypertension    Hyperlipidemia    FAMILY HISTORY: Unable to obtain secondary to patient condition     SOCIAL HISTORY: Unable to obtain secondary to patient condition     ADVANCE DIRECTIVES: FULL CODE     REVIEW OF SYSTEMS: (limited due to dementia)  CONSTITUTIONAL: No fever, weight loss, or fatigue  EYES: No eye pain, visual disturbances, or discharge  ENMT:  No difficulty hearing, tinnitus, vertigo; No sinus or throat pain  NECK: No pain or stiffness  BREASTS: No pain, masses, or nipple discharge  RESPIRATORY: No cough, wheezing, chills or hemoptysis; No shortness of breath  CARDIOVASCULAR: No chest pain, palpitations, dizziness, or leg swelling  GASTROINTESTINAL: No abdominal or epigastric pain. No nausea, vomiting, or hematemesis; No diarrhea or constipation. No melena or hematochezia.  GENITOURINARY: No dysuria, frequency, hematuria, or incontinence  NEUROLOGICAL: No headaches, memory loss, loss of strength, numbness, or tremors  SKIN: No itching, burning, rashes, or lesions   MUSCULOSKELETAL: No joint pain or swelling; No muscle, back, or extremity pain    ICU Vital Signs Last 24 Hrs  T(C): 36.7 (2021 18:45), Max: 36.7 (2021 00:46)  T(F): 98 (2021 18:45), Max: 98 (2021 00:46)  HR: 78 (2021 18:45) (78 - 100)  BP: 107/69 (2021 18:45) (107/69 - 179/79)  RR: 18 (2021 18:45) (16 - 18)  SpO2: 99% (2021 18:45) (99% - 100%)    PHYSICAL EXAM:  GENERAL: NAD, well-groomed, well-developed  HEAD:  Atraumatic, Normocephalic  EYES: EOMI, PERRLA, conjunctiva and sclera clear  ENMT: No tonsillar erythema, exudates, or enlargement; Moist mucous membranes, Good dentition, No lesions  NECK: Supple, No JVD, Normal thyroid  NERVOUS SYSTEM:  Alert & Oriented X0, poor concentration; Motor Strength 5/5 B/L upper and lower extremities; DTRs 2+ intact and symmetric  CHEST/LUNG: Clear to percussion bilaterally; No rales, rhonchi, wheezing, or rubs  HEART: Regular rate and rhythm; No murmurs, rubs, or gallops  ABDOMEN: Soft, Nontender, Nondistended; Bowel sounds present  EXTREMITIES:  2+ Peripheral Pulses, B/L LE edema R>L, No clubbing, cyanosis,   SKIN: No rashes or lesions    LABS:  CBC Full  -  ( 2021 04:34 )  WBC Count : 13.65 K/uL  RBC Count : 3.90 M/uL  Hemoglobin : 11.1 g/dL  Hematocrit : 32.4 %  Platelet Count - Automated : 342 K/uL  Mean Cell Volume : 83.1 fL  Mean Cell Hemoglobin : 28.5 pg  Mean Cell Hemoglobin Concentration : 34.3 gm/dL  Auto Neutrophil # : 10.50 K/uL  Auto Lymphocyte # : 2.18 K/uL  Auto Monocyte # : 0.80 K/uL  Auto Eosinophil # : 0.02 K/uL  Auto Basophil # : 0.05 K/uL  Auto Neutrophil % : 76.9 %  Auto Lymphocyte % : 16.0 %  Auto Monocyte % : 5.9 %  Auto Eosinophil % : 0.1 %  Auto Basophil % : 0.4 %    11-23    125<L>  |  90<L>  |  14  ----------------------------<  241<H>  4.0   |  21<L>  |  0.89    Ca    8.9      2021 16:23  Phos  2.5     11-23  Mg     1.50     11-    TPro  6.8  /  Alb  3.7  /  TBili  0.5  /  DBili  x   /  AST  22  /  ALT  8   /  AlkPhos  84  11-23    CAPILLARY BLOOD GLUCOSE      POCT Blood Glucose.: 208 mg/dL (2021 20:05)    PT/INR - ( 2021 04:34 )   PT: 12.0 sec;   INR: 1.05 ratio         PTT - ( 2021 04:34 )  PTT:26.7 sec  Urinalysis Basic - ( 2021 07:20 )    Color: Light Green / Appearance: Clear / S.022 / pH: x  Gluc: x / Ketone: Negative  / Bili: Negative / Urobili: <2 mg/dL   Blood: x / Protein: Trace / Nitrite: Negative   Leuk Esterase: Negative / RBC: 12 /HPF / WBC 0 /HPF   Sq Epi: x / Non Sq Epi: 1 /HPF / Bacteria: Negative            
HPI: Ms. Estrada is a Citizen of [place]-speaking 89F with T2DM (A1c 8.2%), HTN, Dementia (by collateral report) who presents complaining of chest pressure, abdominal pain, and nausea for 1 week - 1 month intermittently. Endocrine consulted for T2DM management.    Patient unable to provide history and no family or friends available for collateral. Citizen of [place]  ID [id number].    Per recent Endocrinology outpatient records (11/5/21): taking Tresiba 20-22 units qhs. Prescribed correction scale pre-meal as follows: before breakfast is to take 10-14-16-82-28-23-20-22 units Novolog; before lunch 9-10-12-14-15-16-17-18-20 and before dinner is to take 7-8-10-21-56-61-14-15 Novolog. Unclear what the BG cutoffs are for the scales. Apparently patient frequently missing pre-meal insulin if FS in low-mid 100s. No novolog at bedtime and encouraged to have bedtime snack. Logbook showed fasting -251, pre-lunch 139-193, pre-dinner , -295. Patient on creon for unclear indication.      PAST MEDICAL & SURGICAL HISTORY:  Diabetes mellitus    Hypertension    Hyperlipidemia        FAMILY HISTORY: Unable to obtain.      Social History: Unable to obtain.    Outpatient Medications: Home Medications:  Aspir 81 oral delayed release tablet: 1 tab(s) orally once a day (24 Nov 2021 12:38)  Azopt 1% ophthalmic suspension: 1 drop(s) in each eye 2 times a day (24 Nov 2021 12:38)  cloNIDine 0.1 mg oral tablet: 1 tab(s) orally 2 times a day, As Needed (24 Nov 2021 12:38)  clopidogrel 75 mg oral tablet: 1 tab(s) orally once a day (24 Nov 2021 12:38)  Creon 24,000 units oral delayed release capsule: 1 cap(s) orally 3 times a day (24 Nov 2021 12:38)  diclofenac sodium 50 mg oral delayed release tablet: 1 tab(s) orally once a day, As Needed (24 Nov 2021 12:38)  dicyclomine 20 mg oral tablet: 1 tab(s) orally 3 times a day, As Needed (24 Nov 2021 12:38)  enalapril-hydrochlorothiazide 10 mg-25 mg oral tablet: 1 tab(s) orally once a day (24 Nov 2021 12:38)  ergocalciferol 1.25 mg (50,000 intl units) oral capsule: 1 cap(s) orally once a week (24 Nov 2021 12:38)  famotidine 20 mg oral tablet: 1 tab(s) orally once a day (24 Nov 2021 12:38)  folic acid 1 mg oral tablet: 1 tab(s) orally once a day (24 Nov 2021 12:38)  furosemide 20 mg oral tablet: 1 tab(s) orally once a day (24 Nov 2021 12:38)  hydrALAZINE 50 mg oral tablet: 1 tab(s) orally 3 times a day (24 Nov 2021 12:38)  isosorbide mononitrate 30 mg oral tablet, extended release: 1 tab(s) orally once a day (in the morning) (24 Nov 2021 12:38)  loperamide 2 mg oral capsule: 2 cap(s) orally 2 times a day, As Needed (24 Nov 2021 12:38)  NIFEdipine 30 mg oral tablet, extended release: 1 tab(s) orally 2 times a day (24 Nov 2021 12:38)  nitroglycerin 0.3 mg sublingual tablet: 1 tab(s) sublingual every 5 minutes, As Needed (24 Nov 2021 12:38)  NovoLOG FlexPen 100 units/mL injectable solution: 14 unit(s) subcutaneous before breakfast  6 unit(s) subcutaneous before lunch  8 unit(s) subcutaneous before dinner (24 Nov 2021 12:38)  omeprazole 40 mg oral delayed release capsule: 1 cap(s) orally once a day (24 Nov 2021 12:38)  ondansetron 4 mg oral tablet: 2 tab(s) orally once a day, As Needed (24 Nov 2021 12:38)  Tresiba FlexTouch 100 units/mL subcutaneous solution: 11 unit(s) subcutaneous once a day (24 Nov 2021 12:38)      MEDICATIONS  (STANDING):  aspirin enteric coated 81 milliGRAM(s) Oral daily  azithromycin  IVPB      azithromycin  IVPB 500 milliGRAM(s) IV Intermittent once  cefTRIAXone   IVPB      dextrose 40% Gel 15 Gram(s) Oral once  dextrose 5%. 1000 milliLiter(s) (50 mL/Hr) IV Continuous <Continuous>  dextrose 5%. 1000 milliLiter(s) (100 mL/Hr) IV Continuous <Continuous>  dextrose 50% Injectable 25 Gram(s) IV Push once  dextrose 50% Injectable 12.5 Gram(s) IV Push once  dextrose 50% Injectable 25 Gram(s) IV Push once  dorzolamide 2% Ophthalmic Solution 2 Drop(s) Both EYES every 8 hours  enoxaparin Injectable 40 milliGRAM(s) SubCutaneous daily  famotidine    Tablet 20 milliGRAM(s) Oral daily  folic acid 1 milliGRAM(s) Oral daily  glucagon  Injectable 1 milliGRAM(s) IntraMuscular once  insulin glargine Injectable (LANTUS) 10 Unit(s) SubCutaneous at bedtime  insulin lispro (ADMELOG) corrective regimen sliding scale   SubCutaneous three times a day before meals  isosorbide   mononitrate ER Tablet (IMDUR) 30 milliGRAM(s) Oral daily  NIFEdipine XL 30 milliGRAM(s) Oral two times a day  pancrelipase  (CREON 24,000 Lipase Units) 1 Capsule(s) Oral three times a day with meals    MEDICATIONS  (PRN):  ondansetron    Tablet 4 milliGRAM(s) Oral daily PRN Nausea and/or Vomiting      Allergies    No Known Allergies    Intolerances      Review of Systems:  Unable to obtain.        PHYSICAL EXAM:  VITALS: T(C): 36.6 (11-24-21 @ 12:10)  T(F): 97.9 (11-24-21 @ 12:10), Max: 99.2 (11-24-21 @ 10:23)  HR: 100 (11-24-21 @ 12:10) (78 - 108)  BP: 174/61 (11-24-21 @ 12:10) (107/69 - 180/76)  RR:  (18 - 20)  SpO2:  (96% - 100%)  Wt(kg): --  General: Well-developed female, No acute distress, Speaking full sentences.   Eye:  Extraocular movements are intact, No proptosis or lid lag.   HENT:  Normocephalic.   Neck:  Supple, Non-tender.   Respiratory:  Rhonchi appreciated, Respirations are non-labored, Symmetric chest wall expansion.   Cardiovascular:  Normal rate, Regular rhythm, No edema.  Gastrointestinal:  Soft, Non-tender, Non-distended.   Musculoskeletal:  Normal range of motion, No gross joint swelling.   Feet:  Normal by visual exam, Normal pulses, No ulcers.   Integumentary:  Warm, dry.  Mental Status Exam:  Not fully oriented, Speech clear.   Neurologic:  Normal motor function, No focal deficits, Cranial Nerves II-XII are grossly intact bilaterally.   Psychiatric:  Cooperative, Appropriate mood & affect.    POCT Blood Glucose.: 178 mg/dL (11-24-21 @ 16:27)  POCT Blood Glucose.: 243 mg/dL (11-24-21 @ 11:30)  POCT Blood Glucose.: 266 mg/dL (11-24-21 @ 09:39)  POCT Blood Glucose.: 234 mg/dL (11-23-21 @ 22:51)  POCT Blood Glucose.: 208 mg/dL (11-23-21 @ 20:05)  POCT Blood Glucose.: 250 mg/dL (11-23-21 @ 13:25)  POCT Blood Glucose.: 237 mg/dL (11-23-21 @ 09:07)  POCT Blood Glucose.: 160 mg/dL (11-23-21 @ 08:58)  POCT Blood Glucose.: 236 mg/dL (11-23-21 @ 08:54)  POCT Blood Glucose.: 160 mg/dL (11-23-21 @ 00:49)                            11.6   22.13 )-----------( 359      ( 24 Nov 2021 06:26 )             33.8       11-24    122<L>  |  89<L>  |  16  ----------------------------<  248<H>  4.7   |  17<L>  |  0.84    EGFR if : 67  EGFR if non : 58<L>    Ca    8.8      11-24  Mg     2.10     11-24  Phos  3.1     11-24    TPro  6.2  /  Alb  3.3  /  TBili  0.6  /  DBili  x   /  AST  17  /  ALT  8   /  AlkPhos  92  11-24      Thyroid Function Tests:  11-23 @ 04:48 TSH 2.49 FreeT4 -- T3 -- Anti TPO -- Anti Thyroglobulin Ab -- TSI --      11-24 Chol 159 Direct LDL -- LDL calculated 91 HDL 50<L> Trig 91        Radiology:

## 2021-11-25 DIAGNOSIS — N17.9 ACUTE KIDNEY FAILURE, UNSPECIFIED: ICD-10-CM

## 2021-11-25 LAB
ALBUMIN SERPL ELPH-MCNC: 3.6 G/DL — SIGNIFICANT CHANGE UP (ref 3.3–5)
ALP SERPL-CCNC: 88 U/L — SIGNIFICANT CHANGE UP (ref 40–120)
ALT FLD-CCNC: 8 U/L — SIGNIFICANT CHANGE UP (ref 4–33)
ANION GAP SERPL CALC-SCNC: 12 MMOL/L — SIGNIFICANT CHANGE UP (ref 7–14)
ANISOCYTOSIS BLD QL: SLIGHT — SIGNIFICANT CHANGE UP
AST SERPL-CCNC: 14 U/L — SIGNIFICANT CHANGE UP (ref 4–32)
BASOPHILS # BLD AUTO: 0.24 K/UL — HIGH (ref 0–0.2)
BASOPHILS NFR BLD AUTO: 0.9 % — SIGNIFICANT CHANGE UP (ref 0–2)
BILIRUB SERPL-MCNC: 0.4 MG/DL — SIGNIFICANT CHANGE UP (ref 0.2–1.2)
BUN SERPL-MCNC: 30 MG/DL — HIGH (ref 7–23)
CALCIUM SERPL-MCNC: 9.2 MG/DL — SIGNIFICANT CHANGE UP (ref 8.4–10.5)
CHLORIDE SERPL-SCNC: 90 MMOL/L — LOW (ref 98–107)
CO2 SERPL-SCNC: 24 MMOL/L — SIGNIFICANT CHANGE UP (ref 22–31)
CREAT SERPL-MCNC: 1.56 MG/DL — HIGH (ref 0.5–1.3)
EOSINOPHIL # BLD AUTO: 0 K/UL — SIGNIFICANT CHANGE UP (ref 0–0.5)
EOSINOPHIL NFR BLD AUTO: 0 % — SIGNIFICANT CHANGE UP (ref 0–6)
FERRITIN SERPL-MCNC: 123 NG/ML — SIGNIFICANT CHANGE UP (ref 15–150)
GIANT PLATELETS BLD QL SMEAR: PRESENT — SIGNIFICANT CHANGE UP
GLUCOSE BLDC GLUCOMTR-MCNC: 133 MG/DL — HIGH (ref 70–99)
GLUCOSE BLDC GLUCOMTR-MCNC: 152 MG/DL — HIGH (ref 70–99)
GLUCOSE BLDC GLUCOMTR-MCNC: 210 MG/DL — HIGH (ref 70–99)
GLUCOSE BLDC GLUCOMTR-MCNC: 230 MG/DL — HIGH (ref 70–99)
GLUCOSE SERPL-MCNC: 191 MG/DL — HIGH (ref 70–99)
HCT VFR BLD CALC: 33.8 % — LOW (ref 34.5–45)
HGB BLD-MCNC: 11.9 G/DL — SIGNIFICANT CHANGE UP (ref 11.5–15.5)
IANC: 21.63 K/UL — HIGH (ref 1.5–8.5)
IRON SATN MFR SERPL: 15 UG/DL — LOW (ref 30–160)
IRON SATN MFR SERPL: 6 % — LOW (ref 14–50)
LYMPHOCYTES # BLD AUTO: 1.6 K/UL — SIGNIFICANT CHANGE UP (ref 1–3.3)
LYMPHOCYTES # BLD AUTO: 6.1 % — LOW (ref 13–44)
MAGNESIUM SERPL-MCNC: 2.2 MG/DL — SIGNIFICANT CHANGE UP (ref 1.6–2.6)
MCHC RBC-ENTMCNC: 29.1 PG — SIGNIFICANT CHANGE UP (ref 27–34)
MCHC RBC-ENTMCNC: 35.2 GM/DL — SIGNIFICANT CHANGE UP (ref 32–36)
MCV RBC AUTO: 82.6 FL — SIGNIFICANT CHANGE UP (ref 80–100)
MICROCYTES BLD QL: SLIGHT — SIGNIFICANT CHANGE UP
MONOCYTES # BLD AUTO: 1.13 K/UL — HIGH (ref 0–0.9)
MONOCYTES NFR BLD AUTO: 4.3 % — SIGNIFICANT CHANGE UP (ref 2–14)
NEUTROPHILS # BLD AUTO: 22.78 K/UL — HIGH (ref 1.8–7.4)
NEUTROPHILS NFR BLD AUTO: 87 % — HIGH (ref 43–77)
PHOSPHATE SERPL-MCNC: 3.8 MG/DL — SIGNIFICANT CHANGE UP (ref 2.5–4.5)
PLAT MORPH BLD: ABNORMAL
PLATELET # BLD AUTO: 381 K/UL — SIGNIFICANT CHANGE UP (ref 150–400)
PLATELET COUNT - ESTIMATE: NORMAL — SIGNIFICANT CHANGE UP
POIKILOCYTOSIS BLD QL AUTO: SLIGHT — SIGNIFICANT CHANGE UP
POLYCHROMASIA BLD QL SMEAR: SLIGHT — SIGNIFICANT CHANGE UP
POTASSIUM SERPL-MCNC: 3.9 MMOL/L — SIGNIFICANT CHANGE UP (ref 3.5–5.3)
POTASSIUM SERPL-SCNC: 3.9 MMOL/L — SIGNIFICANT CHANGE UP (ref 3.5–5.3)
PROT SERPL-MCNC: 6.8 G/DL — SIGNIFICANT CHANGE UP (ref 6–8.3)
RBC # BLD: 4.09 M/UL — SIGNIFICANT CHANGE UP (ref 3.8–5.2)
RBC # FLD: 14.8 % — HIGH (ref 10.3–14.5)
RBC BLD AUTO: ABNORMAL
SODIUM SERPL-SCNC: 126 MMOL/L — LOW (ref 135–145)
TIBC SERPL-MCNC: 256 UG/DL — SIGNIFICANT CHANGE UP (ref 220–430)
UIBC SERPL-MCNC: 241 UG/DL — SIGNIFICANT CHANGE UP (ref 110–370)
VARIANT LYMPHS # BLD: 1.7 % — SIGNIFICANT CHANGE UP (ref 0–6)
WBC # BLD: 26.18 K/UL — HIGH (ref 3.8–10.5)
WBC # FLD AUTO: 26.18 K/UL — HIGH (ref 3.8–10.5)

## 2021-11-25 PROCEDURE — 99233 SBSQ HOSP IP/OBS HIGH 50: CPT | Mod: GC

## 2021-11-25 RX ORDER — HALOPERIDOL DECANOATE 100 MG/ML
1 INJECTION INTRAMUSCULAR ONCE
Refills: 0 | Status: COMPLETED | OUTPATIENT
Start: 2021-11-25 | End: 2021-11-25

## 2021-11-25 RX ORDER — POLYETHYLENE GLYCOL 3350 17 G/17G
17 POWDER, FOR SOLUTION ORAL AT BEDTIME
Refills: 0 | Status: DISCONTINUED | OUTPATIENT
Start: 2021-11-25 | End: 2021-11-29

## 2021-11-25 RX ORDER — HALOPERIDOL DECANOATE 100 MG/ML
2.5 INJECTION INTRAMUSCULAR ONCE
Refills: 0 | Status: COMPLETED | OUTPATIENT
Start: 2021-11-25 | End: 2021-11-25

## 2021-11-25 RX ORDER — SODIUM CHLORIDE 9 MG/ML
1000 INJECTION, SOLUTION INTRAVENOUS
Refills: 0 | Status: DISCONTINUED | OUTPATIENT
Start: 2021-11-25 | End: 2021-11-26

## 2021-11-25 RX ORDER — SENNA PLUS 8.6 MG/1
2 TABLET ORAL AT BEDTIME
Refills: 0 | Status: DISCONTINUED | OUTPATIENT
Start: 2021-11-25 | End: 2021-11-29

## 2021-11-25 RX ORDER — LANOLIN ALCOHOL/MO/W.PET/CERES
6 CREAM (GRAM) TOPICAL AT BEDTIME
Refills: 0 | Status: DISCONTINUED | OUTPATIENT
Start: 2021-11-25 | End: 2021-11-29

## 2021-11-25 RX ADMIN — SODIUM CHLORIDE 100 MILLILITER(S): 9 INJECTION, SOLUTION INTRAVENOUS at 11:45

## 2021-11-25 RX ADMIN — HALOPERIDOL DECANOATE 2.5 MILLIGRAM(S): 100 INJECTION INTRAMUSCULAR at 04:21

## 2021-11-25 RX ADMIN — ENOXAPARIN SODIUM 40 MILLIGRAM(S): 100 INJECTION SUBCUTANEOUS at 11:18

## 2021-11-25 RX ADMIN — Medication 6 UNIT(S): at 17:00

## 2021-11-25 RX ADMIN — Medication 30 MILLIGRAM(S): at 17:02

## 2021-11-25 RX ADMIN — Medication 6 UNIT(S): at 07:44

## 2021-11-25 RX ADMIN — DORZOLAMIDE HYDROCHLORIDE 2 DROP(S): 20 SOLUTION/ DROPS OPHTHALMIC at 09:51

## 2021-11-25 RX ADMIN — INSULIN GLARGINE 14 UNIT(S): 100 INJECTION, SOLUTION SUBCUTANEOUS at 21:45

## 2021-11-25 RX ADMIN — Medication 2: at 07:44

## 2021-11-25 RX ADMIN — Medication 1: at 12:01

## 2021-11-25 RX ADMIN — HALOPERIDOL DECANOATE 1 MILLIGRAM(S): 100 INJECTION INTRAMUSCULAR at 20:25

## 2021-11-25 RX ADMIN — Medication 100 MILLIGRAM(S): at 17:03

## 2021-11-25 RX ADMIN — Medication 6 MILLIGRAM(S): at 21:20

## 2021-11-25 RX ADMIN — CEFTRIAXONE 100 MILLIGRAM(S): 500 INJECTION, POWDER, FOR SOLUTION INTRAMUSCULAR; INTRAVENOUS at 11:28

## 2021-11-25 RX ADMIN — DORZOLAMIDE HYDROCHLORIDE 2 DROP(S): 20 SOLUTION/ DROPS OPHTHALMIC at 17:29

## 2021-11-25 RX ADMIN — AZITHROMYCIN 255 MILLIGRAM(S): 500 TABLET, FILM COATED ORAL at 12:21

## 2021-11-25 NOTE — PROGRESS NOTE ADULT - PROBLEM SELECTOR PLAN 5
A1c 8.2%. Endo records in [facility] indicate difficult to control DM. Uses Tresiba 22U QHS, NISS at home.   Dx  - FSBS QID  - Weigh pt  - Endo C/S, Appreciate Recs  Rx:  - C/W Glargine (Lantus) 10U QHS (given poor PO intake at this time, unclear weight)  - CATE Creatinine 0.84 --> 1.56 today. PCP reports renal dysfunction as OP but had normal creatinine on admission. Pre-renal most likely, but given hematuria, abd pain, will check lytes again and reassess FeNa & FeUrea to further characterize this problem. if persistent, may need ultrasound or repeat CT given difficult to obtain hx.   Dx:  [ ] ULytes, UUrea, UCr  Rx:  - Low threshold for further fluid resuscitation Creatinine 0.84 --> 1.56 today. PCP reports renal dysfunction as OP but had normal creatinine on admission. Pre-renal most likely, but given hematuria, abd pain, will check lytes again and reassess FeNa & FeUrea to further characterize this problem. if persistent, may need ultrasound or repeat CT given difficult to obtain hx.   Dx:  [ ] ULytes, UUrea, UCr  Rx:  - START LR 100cc/hr x10h  - Low threshold for further fluid resuscitation

## 2021-11-25 NOTE — PROVIDER CONTACT NOTE (OTHER) - REASON
Patient still very agitated and restless, pulling off oxygen, o2 at 87%. Pt still tachy in 110s-120s on telemetry.

## 2021-11-25 NOTE — PROVIDER CONTACT NOTE (OTHER) - BACKGROUND
patient admitted for chest pain.
patient admitted for chest pain.
89 year old female admitted for chest pain. PMH DM2, HTN, AMS.
89 year old female admitted for chest pain. PMH DM2, HTN, AMS.
Pt is  89 y female admitted for chest pain, n/v
89 year old female admitted for chest pain. PMH DM2, HTN, AMS.
patient admitted for chest pain.
89 year old female admitted for chest pain. PMH DM2, HTN, AMS.

## 2021-11-25 NOTE — PROGRESS NOTE ADULT - PROBLEM SELECTOR PLAN 6
Mild, normocytic anemia. Reportedly rx'd both famotidine & omeprazole. PPI increases risk for many things, particularly in elderly patients, and specifically can be a/w RONALD.  - Iron Studies  - CONTINUE Home Folate 1mg PO QD Hyponatremia to 122 on presentation --> 126 (corrects to 127). SOsm 270 c/w hypoosmolar hyponatremia. Given history of multiple diuretics and reports of diuretic overuse as OP, likely hypovolemic hyponatremia from renal losses in this context (especially given inappropriately sodium rich urine - Kalpana > 100). UOsm in this context also inappropriately concentrated > SOsm, which could indicated inappropriate RASS/ADH activation ico hypovolemia vs SIADH/poor intake. Less likely SIADH with SUric acid >4.  Kalpana 103 on admission (inappropriately elevated, likely 2/2 diuretics).  Dx  - Repeat UOsm, Kalpana now that off diuretics    Rx  - S/P 0.5L NS in ED  - PO intake encouraged, may need further volume repletion carefully Hyponatremia to 122 on presentation --> 126 (corrects to 127). SOsm 270 c/w hypoosmolar hyponatremia. Given history of multiple diuretics and reports of diuretic overuse as OP, likely hypovolemic hyponatremia from renal losses in this context (especially given inappropriately sodium rich urine - Kalpana > 100). UOsm in this context also inappropriately concentrated > SOsm, which could indicated inappropriate RASS/ADH activation ico hypovolemia vs SIADH/poor intake. Less likely SIADH with SUric acid >4.  Kalpana 103 on admission (inappropriately elevated, likely 2/2 diuretics).  Dx  - Repeat UOsm, Kalpana now that off diuretics    Rx  - S/P 0.5L NS in ED  - PO intake encouraged, may need further volume repletion carefully (as above)

## 2021-11-25 NOTE — PROVIDER CONTACT NOTE (OTHER) - REASON
patient has azithromycin ordered one time dose, day RN says waiting on blood cultures, is it ok to administer for 10 pm?

## 2021-11-25 NOTE — PROVIDER CONTACT NOTE (OTHER) - SITUATION
azithromycin ordered one time dose, day RN says waiting on blood cultures, is it ok to administer for 10 pm? sibling/father/mother

## 2021-11-25 NOTE — PROGRESS NOTE ADULT - PROBLEM SELECTOR PLAN 2
Pt presents with CP of variable timeline (based on pt vs collateral history anywhere from 1d - 1 month). Trop rising in ED (with change > 6), ECG with LBBB (unclear if new or old w/o records; w/o meeting Scar[place] criteria) but without dynamic changes or continued uptrending troponin. Possibly some supply/demand mismatch (i.e. NSTEMI Type 2).  Dx  - TTE ordered  - Cardiology C/S, Appreciate Recs (follow-up when TTE returned)    Rx:  - CONTINUE Home ASA 81mg PO QD (s/p addl 162 in ED)  - DIScontinue Home Clopidogrel 75mg PO QD (unclear indication)  - HOLDing Home ACEi/HCTZ d/t Hyponatremia as below  - HOLDing Home Furosemide 20mg PO QD (unclear EDW, ZEUS) Intermittent reports of agitation, not engaging with staff or with provider teams. Collateral hx obtained from PCP indicates "if any dementia, it's very, very mild". Most likely hospital delirium in an elderly patient with ?polypharmacy, but given symptomatology, also high risk for infectious and anatomic process. Other DDx includes:  - metabolic derangements (incl. hyponatremia, hyperglycemia - though neither is significantly abnormal enough that would cause significant symptoms)  - infectious/toxic metabolic encephalopathy (leukocytosis, cough, though no clear PNA on CXR, less likely abdominal but monitoring closely)    Diagnostics  - NC HCT w/o acute intracranial pathology  - infectious work-up as below  - hyponatremia, hyperglycemia management as below    Therapeutics  - Delirium Precuations (frequent re-orientation, using  every time, windows open during the day)

## 2021-11-25 NOTE — PROVIDER CONTACT NOTE (OTHER) - ACTION/TREATMENT ORDERED:
HS3 made aware of situation.
provider made aware, no interventions done at this time. provider said to notify if patient hypotensive, and if HR goes up to 140's. will continue to monitor.
Night float #[id number] notified of situation.
provider made aware, said ok to give azythromycin
HS3 made aware of situation.
HS3 Jg Campos at bedside to assess patient. Night float #[id number] notified of situation. Will order 1 mg Haldol.
provider made aware, haldol 2.5 mg IM ordered
Provider aware. Instructed to continue to monitor

## 2021-11-25 NOTE — PROVIDER CONTACT NOTE (OTHER) - DATE AND TIME:
24-Nov-2021 21:40
25-Nov-2021 04:10
25-Nov-2021 19:24
25-Nov-2021 22:45
24-Nov-2021 18:54
25-Nov-2021 18:33
25-Nov-2021 11:36
25-Nov-2021 20:18

## 2021-11-25 NOTE — PROGRESS NOTE ADULT - ASSESSMENT
Ms. Estrada is a 89 year old Czech-speaking woman with HTN, T2DM (A1c 8.2%), dementia (by report, NOS) who presents with acute on subacute chest pain with associated HASTINGS and abdominal pain whose differential is broad but includes ACS (incl. unstable angina vs NSTEMI), intra-abdominal pathologies (symptomatic worsening of hiatal hernia?). She may now also have altered mental status ico worsening leukocytosis despite continued lack of obvious etiology, so will be initiated on empiric antibiotics pending further evaluation.

## 2021-11-25 NOTE — PROVIDER CONTACT NOTE (OTHER) - REASON
Patient refused all oral 12pm meds (pt spat out) including aspirin, folic acid, isosorbide, famotidine and creon.

## 2021-11-25 NOTE — PROGRESS NOTE ADULT - PROBLEM SELECTOR PLAN 1
Intermittent reports of agitation, not engaging with staff or with provider teams. Collateral hx obtained from PCP indicates "if any dementia, it's very, very mild". Most likely hospital delirium in an elderly patient with ?polypharmacy, but given symptomatology, also high risk for infectious and anatomic process. Other DDx includes:  - metabolic derangements (incl. hyponatremia, hyperglycemia - though neither is significantly abnormal enough that would cause significant symptoms)  - infectious/toxic metabolic encephalopathy (leukocytosis, cough, though no clear PNA on CXR, less likely abdominal but monitoring closely)  - less likely traumatic but other intracranial pathology could cause this as well    Diagnostics  - NC HCT  - infectious work-up as below  - hyponatremia, hyperglycemia management as below    Therapeutics  - Delirium Precuations (frequent re-orientation, using  every time, windows open during the day) Leukocytosis worsened to 26 w/ NLR 10 (from 5), which is an uptrend. Appears to have waxing-waning AMS  Diagnostics  - MRSA Swab  - UCx (-), obtain ULegionella, UStrep Ag  - BCx x2  - CXR (d/t cough, leukocytosis)    Therapeutics  [] START CTX 1g IV QD x5d (for ?CAP) (11/24-)  [] START Azithromycin 500mg IV QD x3d (for ?CAP) (11/24-)  - CTM (trend NLR for better understanding of this pt's physiology) Leukocytosis worsened to 26 11/25. (11/23-24 NLR 10 from 5), continues to uptrend. Appears to have waxing-waning AMS, which may indicate TME as component of this.  Diagnostics  - MRSA Swab  - UCx (-), obtain ULegionella, UStrep Ag  - BCx x2  - CXR (d/t cough, leukocytosis)    Therapeutics  [] C/W CTX 1g IV QD x5d (for ?CAP) (11/24-) (low threshold to broaden if no clinical/leukocytic improvement)  [] C/W Azithromycin 500mg IV QD x3d (for ?CAP) (11/24-)  - CTM (trend NLR for better understanding of this pt's physiology) Leukocytosis worsened to 26 11/25. (11/23-24 NLR 10 from 5), continues to uptrend. Appears to have waxing-waning AMS, which may indicate TME as component of this.  Diagnostics  - MRSA Swab  - UCx (-), obtain Yarelis Forman Ag  - BCx x1 11/24 (NGTD)  - CXR: RLL ?atelectasis    Therapeutics  [] C/W CTX 1g IV QD x5d (for ?CAP) (11/24-) (low threshold to broaden if no clinical/leukocytic improvement)  [] C/W Azithromycin 500mg IV QD x3d (for ?CAP) (11/24-)  - CTM (trend NLR for better understanding of this pt's physiology)

## 2021-11-25 NOTE — PROVIDER CONTACT NOTE (OTHER) - SITUATION
Pt still very agitated and restless, pulling off oxygen. Oxygen at 87%. Need PRN medication STAT. Pt still tachy in 110s-120s on telemetry.

## 2021-11-25 NOTE — PROGRESS NOTE ADULT - PROBLEM SELECTOR PLAN 7
remains. The right lung remains clear. No interval change large left pleural effusion with left mid and lower lung atelectasis/pneumonia. Xr Ankle Right (min 3 Views)    Result Date: 2/14/2018  XR FOOT RIGHT (MIN 3 VIEWS), XR ANKLE RIGHT (MIN 3 VIEWS)  CLINICAL HISTORY: Generalized pain after fall yesterday COMPARISON: None  FINDINGS: Three  views of the right foot are submitted. There is diffuse generalized osteopenia There is a fracture at the base and medial side of the proximal phalanx of the right second toe. No dislocations. There is a calcaneal enthesophyte There are some degenerative changes of the midfoot. FRACTURE AT THE BASE AND MEDIAL SIDE OF THE PROXIMAL PHALANX OF THE RIGHT SECOND TOE. EXAMINATION: XR FOOT RIGHT (MIN 3 VIEWS), XR ANKLE RIGHT (MIN 3 VIEWS)  CLINICAL HISTORY:Generalized pain after fall yesterday COMPARISONS: None TECHNIQUE: THREE VIEWS  FINDINGS: Three views of the right ankle are submitted. No acute fractures. No dislocations. There is a calcaneal enthesophyte                                                                               IMPRESSION: NO ACUTE FRACTURES    Xr Foot Right (min 3 Views)    Result Date: 2/14/2018  XR FOOT RIGHT (MIN 3 VIEWS), XR ANKLE RIGHT (MIN 3 VIEWS)  CLINICAL HISTORY: Generalized pain after fall yesterday COMPARISON: None  FINDINGS: Three  views of the right foot are submitted. There is diffuse generalized osteopenia There is a fracture at the base and medial side of the proximal phalanx of the right second toe. No dislocations. There is a calcaneal enthesophyte There are some degenerative changes of the midfoot. FRACTURE AT THE BASE AND MEDIAL SIDE OF THE PROXIMAL PHALANX OF THE RIGHT SECOND TOE.  EXAMINATION: XR FOOT RIGHT (MIN 3 VIEWS), XR ANKLE RIGHT (MIN 3 VIEWS)  CLINICAL HISTORY:Generalized pain after fall yesterday COMPARISONS: None TECHNIQUE: THREE VIEWS  FINDINGS: Three views of the right ankle are submitted. No acute fractures. No dislocations. There is a calcaneal enthesophyte                                                                               IMPRESSION: NO ACUTE FRACTURES    Ct Chest Wo Contrast    Result Date: 3/6/2018  EXAMINATION:  CT CHEST WITHOUT IV CONTRAST CLINICAL HISTORY:  EVALUATE LEFT PLEURAL EFFUSION, PATIENT WITH DYSPNEA COMPARISON:  None available TECHNIQUE:  CT chest is obtained without IV contrast. Axial and MPR CT images of the chest were obtained. CT images are viewed with pulmonary and mediastinal window settings. FINDINGS:  There is a very large left pleural effusion with concomitant compression atelectasis of the left lower lobe. Also, there is a moderate size right pleural effusion with concomitant subsegmental atelectasis of the right lower lobe. There is no definite CT evidence of a pulmonary mass however, an endobronchial lesion involving the bilateral lower lobe bronchi is not excluded. There is mild cardiomegaly without a pericardial effusion. There are coronary artery calcifications. There is atherosclerotic calcification of the aorta and there is no thoracic aorta aneurysm. There are calcified hilar and subcarinal lymph nodes compatible with remote granulomatous disease. There is degenerative bone spurring throughout the spine and there is evidence of a previous cholecystectomy. 1. Very large left pleural effusion with concomitant compression atelectasis of the left lower lobe. 2. Moderate size right pleural effusion with concomitant subsegmental atelectasis of the right lower lobe. 3. Pulmonary mass not definitely visualized however, an endobronchial lesion in lower lobe bronchi is not excluded. 4. Extensive atherosclerotic calcification of the aortic arch but no thoracic aorta aneurysm.  5. Mild cardiomegaly with coronary artery calcifications. There is no pericardial effusion. All CT scans at this facility use dose modulation, iterative reconstruction, and/or weight based dosing when appropriate to reduce radiation dose to as low as reasonably achievable. Xr Chest Portable    Result Date: 3/11/2018  Chest one view. HISTORY: Post thoracentesis. FINDINGS: Comparison, March 8, 2018. Right dual-lumen catheter, pacemaker generator, pacemaker wires unchanged. A homogeneous area of increased opacity is found in the left lower lung, obscuring the left cardiac silhouette, left diaphragm, and left lower lobe. Mild airspace disease found at  right lung base. Small to moderate left pleural effusion, markedly decreased since prior study. Right lower lung subsegmental atelectasis/pneumonia. Xr Chest Portable    Result Date: 3/6/2018  EXAMINATION: PORTABLE CHEST X-RAY FROM 3/6/2018 AT 18:08 HOURS CLINICAL HISTORY: RENAL FAILURE ON HEMODIALYSIS, DYSPNEA COMPARISONS: 1/25/2018 FINDINGS: There is a very large pleural effusion occupying approximately three quarters of the left hemithorax. Suspect underlying atelectasis and/or infiltrate in the mid and lower left lung field. There are small granulomas in the lungs and hilar regions compatible with remote granulomatous disease. Suspect cardiomegaly and there is a bipolar cardiac pacemaker superimposed on the left hemithorax with electrodes in the region of the right atrium and right ventricle. There is a right-sided IJ hemodialysis catheter with the distal tip of the catheter in the region of the SVC. There is degenerative bone spurring throughout the spine and arthritis in both shoulders. 1. VERY LARGE PLEURAL EFFUSION OCCUPYING APPROXIMATELY THREE QUARTERS OF THE LEFT HEMITHORAX. 2. SUSPECT UNDERLYING COMPRESSION ATELECTASIS AND/OR INFILTRATE IN THE LEFT LUNG. 3. SATISFACTORY PLACEMENT OF THE RIGHT-SIDED IJ HEMODIALYSIS CATHETER WITH TIP OF CATHETER IN SVC.  4. PROBABLE CARDIOMEGALY WITH A BIPOLAR CARDIAC PACEMAKER IN PLACE. ASSESSMENT AND PLAN     Principal Problem:    Sepsis ([address]  Active Problems:    Acute on chronic diastolic congestive heart failure (HCC)    Pleural effusion    CKD (chronic kidney disease) stage 5, GFR less than 15 ml/min (HCC)    SOB (shortness of breath)    UTI (urinary tract infection)    VRE (vancomycin-resistant Enterococci) infection  Resolved Problems:    * No resolved hospital problems. *      Patient is awaiting placement related issues. Patient could be discharged to nursing home in 1-2 days if okay with Dr. Katelin Colbert. Will discuss with pulmonologist to see if chest x-ray should be repeated.         Chris Malave MD Leukocytosis worsened to 22 w/ NLR 10 (from 5), which is an uptrend. Appears to have waxing-waning AMS  Diagnostics  - MRSA Swab  - UCx (-), obtain ULegionella, UStrep Ag  - BCx x2  - CXR (d/t cough, leukocytosis)    Therapeutics  [] START CTX 1g IV QD x5d (for ?CAP) (11/24-)  [] START Azithromycin 500mg IV QD x3d (for ?CAP) (11/24-)  - CTM (trend NLR for better understanding of this pt's physiology) A1c 8.2%. Endo records in [facility] indicate difficult to control DM. Uses Tresiba 22U QHS, NISS at home.   Dx  - FSBS QID  - Weigh pt  - Endo C/S, Appreciate Recs  Rx:  - C/W Glargine (Lantus) 10U QHS (given poor PO intake at this time, unclear weight)  - CATE A1c 8.2%. Endo records in [facility] indicate difficult to control DM. Uses Tresiba 22U QHS, NISS at home.   Dx  - FSBS QID  - Endo C/S, Appreciate Recs  Rx:  - C/W Glargine (Lantus) QHS (given poor PO intake at this time, unclear weight)  - CATE

## 2021-11-25 NOTE — PROVIDER CONTACT NOTE (OTHER) - ASSESSMENT
Patient AxOx1. Patient agitated and restless currently, trying to pull at lines/tubings. Patient o2 ranging from 86-92%, and constantly taking off nasal cannula. Patient currently tachycardic on telemetry with HR in 110s.

## 2021-11-25 NOTE — PROGRESS NOTE ADULT - SUBJECTIVE AND OBJECTIVE BOX
Sammy Salcedo M.D.  [facility] PGY-1    PROGRESS NOTE:     Patient is a 89y old  Female who presents with a chief complaint of Chest Pain (2021 18:10)      -OVERNIGHT EVENTS-      -SUBJECTIVE-      MEDICATIONS  (STANDING):  aspirin enteric coated 81 milliGRAM(s) Oral daily  azithromycin  IVPB 500 milliGRAM(s) IV Intermittent every 24 hours  azithromycin  IVPB      cefTRIAXone   IVPB 1000 milliGRAM(s) IV Intermittent every 24 hours  cefTRIAXone   IVPB      dextrose 40% Gel 15 Gram(s) Oral once  dextrose 5%. 1000 milliLiter(s) (50 mL/Hr) IV Continuous <Continuous>  dextrose 5%. 1000 milliLiter(s) (100 mL/Hr) IV Continuous <Continuous>  dextrose 50% Injectable 25 Gram(s) IV Push once  dextrose 50% Injectable 12.5 Gram(s) IV Push once  dextrose 50% Injectable 25 Gram(s) IV Push once  dorzolamide 2% Ophthalmic Solution 2 Drop(s) Both EYES every 8 hours  enoxaparin Injectable 40 milliGRAM(s) SubCutaneous daily  famotidine    Tablet 20 milliGRAM(s) Oral daily  folic acid 1 milliGRAM(s) Oral daily  glucagon  Injectable 1 milliGRAM(s) IntraMuscular once  insulin glargine Injectable (LANTUS) 14 Unit(s) SubCutaneous at bedtime  insulin lispro (ADMELOG) corrective regimen sliding scale   SubCutaneous three times a day before meals  insulin lispro Injectable (ADMELOG) 6 Unit(s) SubCutaneous three times a day before meals  isosorbide   mononitrate ER Tablet (IMDUR) 30 milliGRAM(s) Oral daily  NIFEdipine XL 30 milliGRAM(s) Oral two times a day  pancrelipase  (CREON 24,000 Lipase Units) 1 Capsule(s) Oral three times a day with meals    MEDICATIONS  (PRN):  guaiFENesin Oral Liquid (Sugar-Free) 100 milliGRAM(s) Oral every 6 hours PRN Cough  ondansetron    Tablet 4 milliGRAM(s) Oral daily PRN Nausea and/or Vomiting      -OBJECTIVE-    CAPILLARY BLOOD GLUCOSE      POCT Blood Glucose.: 230 mg/dL (2021 03:02)  POCT Blood Glucose.: 170 mg/dL (2021 21:43)  POCT Blood Glucose.: 178 mg/dL (2021 16:27)  POCT Blood Glucose.: 243 mg/dL (2021 11:30)  POCT Blood Glucose.: 266 mg/dL (2021 09:39)    I&O's Summary      VITAL SIGNS  Vital Signs Last 24 Hrs  T(C): 36.7 (2021 05:30), Max: 37.3 (2021 10:23)  T(F): 98.1 (2021 05:30), Max: 99.2 (2021 10:23)  HR: 94 (2021 05:30) (88 - 100)  BP: 101/42 (2021 05:30) (101/42 - 174/61)  BP(mean): --  RR: 18 (2021 05:30) (18 - 18)  SpO2: 95% (2021 05:30) (95% - 100%)    PHYSICAL EXAM:  GENERAL/CONSTITUTIONAL: NAD, Awake, AOx3 (person, place, time)  HEENT: NCAT  ---EYES: no scleral icterus, EOMI, PERRLA  ---ENMT: MMM,   ---NECK: No palpable masses; no thyromegaly  CARDIO: RRR. Normal S1/S2, no m/r/g.  RESP: Normal respiratory effort; CTAB, no w/r/r  ABD: Soft, NTND; +BS.   : No CVAT.   MSK: No obvious deformity or ROM deficit.  ---EXTREMITIES: No peripheral edema. Peripheral pulses 2+ x4.  SKIN: Warm, dry. No rashes.   NEURO: Moves all four extremities spontaneously  PSYCH: Appropriate mood & affect. No VH/AH. No SI/HI.    LABS:                        11.6   22.13 )-----------( 359      ( 2021 06:26 )             33.8     11-24    122<L>  |  89<L>  |  16  ----------------------------<  248<H>  4.7   |  17<L>  |  0.84    Ca    8.8      2021 06:26  Phos  3.1     11-24  Mg     2.10     11-24    TPro  6.2  /  Alb  3.3  /  TBili  0.6  /  DBili  x   /  AST  17  /  ALT  8   /  AlkPhos  92  11-24          Urinalysis Basic - ( 2021 07:20 )    Color: Light Green / Appearance: Clear / S.022 / pH: x  Gluc: x / Ketone: Negative  / Bili: Negative / Urobili: <2 mg/dL   Blood: x / Protein: Trace / Nitrite: Negative   Leuk Esterase: Negative / RBC: 12 /HPF / WBC 0 /HPF   Sq Epi: x / Non Sq Epi: 1 /HPF / Bacteria: Negative        MICROBIOLOGY    Culture - Urine (collected 2021 07:00)  Source: Clean Catch Clean Catch (Midstream)  Final Report (2021 09:06):    <10,000 CFU/mL Normal Urogenital Kamla        IMAGING        COORDINATION OF CARE:  ---PCP:  ---Consultants:   Sammy Salcedo M.D.  [facility] PGY-1    PROGRESS NOTE:     Patient is a 89y old  Female who presents with a chief complaint of Chest Pain (2021 18:10)      -OVERNIGHT EVENTS-  - Significant agitation required Haldol 2.5mg x1    -SUBJECTIVE-  Prydeinig  #[id number] (Zenaida)  Ms. Estrada is signifiantly lethargic/obtunded this morning. She weakly uses her hands to push away from sternal rub but does not purposefully respond to questions or intervention. (assessed ~3 hours after haldol dosing)    Nursing staff later reports that she's waking up more as the day progresses.    MEDICATIONS  (STANDING):  aspirin enteric coated 81 milliGRAM(s) Oral daily  azithromycin  IVPB 500 milliGRAM(s) IV Intermittent every 24 hours  azithromycin  IVPB      cefTRIAXone   IVPB 1000 milliGRAM(s) IV Intermittent every 24 hours  cefTRIAXone   IVPB      dextrose 40% Gel 15 Gram(s) Oral once  dextrose 5%. 1000 milliLiter(s) (50 mL/Hr) IV Continuous <Continuous>  dextrose 5%. 1000 milliLiter(s) (100 mL/Hr) IV Continuous <Continuous>  dextrose 50% Injectable 25 Gram(s) IV Push once  dextrose 50% Injectable 12.5 Gram(s) IV Push once  dextrose 50% Injectable 25 Gram(s) IV Push once  dorzolamide 2% Ophthalmic Solution 2 Drop(s) Both EYES every 8 hours  enoxaparin Injectable 40 milliGRAM(s) SubCutaneous daily  famotidine    Tablet 20 milliGRAM(s) Oral daily  folic acid 1 milliGRAM(s) Oral daily  glucagon  Injectable 1 milliGRAM(s) IntraMuscular once  insulin glargine Injectable (LANTUS) 14 Unit(s) SubCutaneous at bedtime  insulin lispro (ADMELOG) corrective regimen sliding scale   SubCutaneous three times a day before meals  insulin lispro Injectable (ADMELOG) 6 Unit(s) SubCutaneous three times a day before meals  isosorbide   mononitrate ER Tablet (IMDUR) 30 milliGRAM(s) Oral daily  NIFEdipine XL 30 milliGRAM(s) Oral two times a day  pancrelipase  (CREON 24,000 Lipase Units) 1 Capsule(s) Oral three times a day with meals    MEDICATIONS  (PRN):  guaiFENesin Oral Liquid (Sugar-Free) 100 milliGRAM(s) Oral every 6 hours PRN Cough  ondansetron    Tablet 4 milliGRAM(s) Oral daily PRN Nausea and/or Vomiting      -OBJECTIVE-    CAPILLARY BLOOD GLUCOSE      POCT Blood Glucose.: 230 mg/dL (2021 03:02)  POCT Blood Glucose.: 170 mg/dL (2021 21:43)  POCT Blood Glucose.: 178 mg/dL (2021 16:27)  POCT Blood Glucose.: 243 mg/dL (2021 11:30)  POCT Blood Glucose.: 266 mg/dL (2021 09:39)    I&O's Summary      VITAL SIGNS  Vital Signs Last 24 Hrs  T(C): 36.7 (2021 05:30), Max: 37.3 (2021 10:23)  T(F): 98.1 (2021 05:30), Max: 99.2 (2021 10:23)  HR: 94 (2021 05:30) (88 - 100)  BP: 101/42 (2021 05:30) (101/42 - 174/61)  BP(mean): --  RR: 18 (2021 05:30) (18 - 18)  SpO2: 95% (2021 05:30) (95% - 100%)    PHYSICAL EXAM:  GENERAL/CONSTITUTIONAL: NAD, responsive to painful stimuli, appears to be protecting her airway  HEENT: NCAT  ---EYES: no scleral icterus, EOMI, PERRLA  ---ENMT: MMM  CARDIO: RRR. Normal S1/S2, no m/r/g.  RESP: +cough, limited aeration throughout, lying on left side w/ crackles at bases  ABD: Soft, NTND  : No CVAT.   MSK: No obvious deformity or ROM deficit.  ---EXTREMITIES: trace peripheral edema in b/l LE, pulses 2+ b/l in b/l LE  SKIN: Warm, dry. No rashes.   NEURO: Moves all four extremities spontaneously  PSYCH: Obtunded/lethargic; reports of intermittent normal activity    LABS:                        11.6   22.13 )-----------( 359      ( 2021 06:26 )             33.8     11-24    122<L>  |  89<L>  |  16  ----------------------------<  248<H>  4.7   |  17<L>  |  0.84    Ca    8.8      2021 06:26  Phos  3.1       Mg     2.10         TPro  6.2  /  Alb  3.3  /  TBili  0.6  /  DBili  x   /  AST  17  /  ALT  8   /  AlkPhos  92      Urinalysis Basic - ( 2021 07:20 )    Color: Light Green / Appearance: Clear / S.022 / pH: x  Gluc: x / Ketone: Negative  / Bili: Negative / Urobili: <2 mg/dL   Blood: x / Protein: Trace / Nitrite: Negative   Leuk Esterase: Negative / RBC: 12 /HPF / WBC 0 /HPF   Sq Epi: x / Non Sq Epi: 1 /HPF / Bacteria: Negative        MICROBIOLOGY    Culture - Urine (collected 2021 07:00)  Source: Clean Catch Clean Catch (Midstream)  Final Report (2021 09:06):    <10,000 CFU/mL Normal Urogenital Kamla      IMAGING  < from: CT Head No Cont (21 @ 22:20) >  FINDINGS:  There is no CT evidence of acute intracranial hemorrhage, extra-axial collection, vasogenic edema, mass effect, midline shift, central herniation, or hydrocephalus.    There is mild to moderate cerebral volume loss. There is patchy white matter hypoattenuation which is nonspecific in etiology but likely related to chronic microvascular changes.    The visualized paranasal sinuses are clear. The mastoid air cells and middle ear cavities are clear.    The soft tissues of the scalp are unremarkable. The calvarium is intact. Status post bilateral lens transplant.    IMPRESSION:  No CT evidence of acute intracranial hemorrhage, brain edema, or mass effect.  < end of copied text >    < from: Xray Chest 1 View- PORTABLE-Urgent (Xray Chest 1 View- PORTABLE-Urgent .) (21 @ 11:15) >  FINDINGS:    Redemonstrated right basilar opacity probably atelectasis. No pneumothorax or pleural effusion. Heart size not well evaluated.    Retrocardiac opacity consistent with hiatal hernia is seen. No acute osseous findings.    IMPRESSION:    Right basilar opacity probably atelectasis.  < end of copied text >    COORDINATION OF CARE:  ---PCP: Jaylin Ken  ---Consultants: Endocrine

## 2021-11-25 NOTE — PROGRESS NOTE ADULT - PROBLEM SELECTOR PLAN 9
History by documentation, unclear if prescribed a statin or other lipid-lowering medication as an OP based on pharmacy fill history. LDL 91, HDL 50, TCholesterol 159, TAG 91. Not alarmingly abnormal, not obviously hyperlipidemic. Goal LDL with DM may be lower, but given her age, wouldn't initiate this necessarily (also high risk for polypharmacy).  - CTM SBP 140s in ED, as high as >170 p/t assessment by admitting team. Ultimately , goal would likely be SBP < 150 in an ideal world, but given she may be pain, would not aggressively treat right now. OP regimen is quite extensive, but wouldn't re-start it all right away. SBP fluctuant but within goal.  - CONTINUE Home Nifedipine XL 30mg PO BID  - CONTINUE Home Isosorbide mononitrate ER (IMDUR) 30mg PO QD  - HOLDing Home Clonidine 0.1mg PO BID (unclear problem)  - HOLDing Home Enalapril-HCTZ 10mg-25mg PO QD (given hyponatremia)  - HOLDing Home Hydralazine 50mg PO TID

## 2021-11-25 NOTE — PROGRESS NOTE ADULT - PROBLEM SELECTOR PLAN 11
Hospital Bundle  Fluids: NPO except meds until dysphagia screening, then DASH   Electrolytes: Replete K > 4, Mg > 2, Phos > 3  Nutrition: Diet DASH/TLC (If passes dysphagia screening)  PPX  ---VTE: LVX  ---GI: Home famotidine 20mg  ---Resp: I/S  Access: PIV  Code Status: FULL CODE  Dispo: Pending medical stabilization, further evaluation  -- Called ?PCP Jaylin Ken [phone] (see chart note 11/24) Has CT-identified, old, Right-sided rib fractures. Unclear baseline gait; given haldol administration on admission, would wait to assess until pt more alert/oriented.  - Fall Precautions  - PT eval

## 2021-11-25 NOTE — PROVIDER CONTACT NOTE (OTHER) - RECOMMENDATIONS
Need PRN medication STAT
Notify provider
provider made aware, said ok to give azythromycin
Patient placed on 2L NC, will continue to monitor on continuous pulse oximetry.
provider made aware, haldol 2.5 mg IM ordered
provider made aware, no interventions done at this time. provider said to notify if patient hypotensive, and if HR goes up to 140's. will continue to monitor.

## 2021-11-25 NOTE — PROGRESS NOTE ADULT - PROBLEM SELECTOR PLAN 4
Hyponatremia to 122 on presentation; 11/24 122 (corrects to 126 when accounting for BGL, so improving). SOsm 270 c/w hypoosmolar hyponatremia. Given history of multiple diuretics (prescribed for unclear etiology at this time), likely hypovolemic hyponatremia from renal losses in this context (especially given inappropriately sodium rich urine - Kalpana > 100). UOsm in this context inappropriately concentrated > SOsm, which could indicated inappropriate RASS/ADH activation ico hypovolemia vs SIADH/poor intake. Less likely SIADH with SUric acid >4.  Kalpana 103 on admission (inappropriately elevated, likely 2/2 diuretics).  Dx    Rx  - S/P 0.5L NS in ED Pt has non-specific abdominal pain with imaging notable for hiatal hernia and scattered, ? old granulomatous disease. UA shows microscopic hematuria w/o WBC, bacteria, LE, or nitrates, which is a/w a broad differential that includes trauma (less likely but reportedly may have fallen), nephrolithiasis (which may also cause abd pain, n/v).   DDx:  - Nephrolithiasis: microscopic hematuria w/ abd pain/n/v; no stone identified on CT; no hydronephrosis  - SBO: h/o abdominal surgery but CT w/o obstruction, pt no longer vomiting; less likely   - Mesentric Ischemia: elderly woman; PCP says she has "cardiac history" but unkown specific diagnoses or recent treatments    Diagnostics  - Lipase WNL    Therapeutics  - C/W Ondansetron 4mg PO Q6H PRN

## 2021-11-25 NOTE — PROGRESS NOTE ADULT - PROBLEM SELECTOR PLAN 8
SBP 140s in ED, as high as >170 p/t assessment by admitting team. Ultimately , goal would likely be SBP < 150 in an ideal world, but given she may be pain, would not aggressively treat right now. OP regimen is quite extensive, but wouldn't re-start it all right away. SBP fluctuant but within goal.  - CONTINUE Home Nifedipine XL 30mg PO BID  - CONTINUE Home Isosorbide mononitrate ER (IMDUR) 30mg PO QD  - HOLDing Home Clonidine 0.1mg PO BID (unclear problem)  - HOLDing Home Enalapril-HCTZ 10mg-25mg PO QD (given hyponatremia)  - HOLDing Home Hydralazine 50mg PO TID Mild, normocytic anemia. Reportedly rx'd both famotidine & omeprazole. PPI increases risk for many things, particularly in elderly patients, and specifically can be a/w RONALD.  - Iron Studies  - CONTINUE Home Folate 1mg PO QD Mild, normocytic anemia. Reportedly rx'd both famotidine & omeprazole. PPI increases risk for many things, particularly in elderly patients, and specifically can be a/w RONALD. Significantly iron deficient, but would not replete given rising leukocytosis, c/f infection.   - Fe 15, TIBC 256, Ferritin 123, TSAT 6%  - Estimated Iron Deficit:   - CONTINUE Home Folate 1mg PO QD

## 2021-11-25 NOTE — PROVIDER CONTACT NOTE (OTHER) - ASSESSMENT
azithromycin ordered one time dose, day RN says waiting on blood cultures, is it ok to administer for 10 pm?

## 2021-11-25 NOTE — PROGRESS NOTE ADULT - PROBLEM SELECTOR PLAN 3
Pt has non-specific abdominal pain with imaging notable for hiatal hernia and scattered, ? old granulomatous disease. UA shows microscopic hematuria w/o WBC, bacteria, LE, or nitrates, which is a/w a broad differential that includes trauma (less likely but reportedly may have fallen), nephrolithiasis (which may also cause abd pain, n/v).   DDx:  - Nephrolithiasis: microscopic hematuria w/ abd pain/n/v; no stone identified on CT; no hydronephrosis  - Trauma: less likely but has rib fx (old) on CT; no new pathology identified  - SBO: h/o abdominal surgery but CT w/o obstruction, pt no longer vomiting; less likely   - Mesentric Ischemia: elderly woman; PCP says she has "cardiac history" but unkown specific diagnoses or recent treatments    Diagnostics  - Lipase WNL    Therapeutics  - C/W Ondansetron 4mg PO Q6H PRN Pt presents with CP of variable timeline (based on pt vs collateral history anywhere from 1d - 1 month). Trop rising in ED (with change > 6), ECG with LBBB (unclear if new or old w/o records; w/o meeting Scar[place]ossa criteria) but without dynamic changes or continued uptrending troponin. Possibly some supply/demand mismatch (i.e. NSTEMI Type 2).  Dx:  - TTE ordered, still pending  - Cardiology C/S, Appreciate Recs (follow-up when TTE returned)    Rx:  - CONTINUE Home ASA 81mg PO QD (s/p addl 162 in ED)  - DIScontinue Home Clopidogrel 75mg PO QD (unclear indication)  - HOLDing Home ACEi/HCTZ d/t Hyponatremia as below  - HOLDing Home Furosemide 20mg PO QD (unclear EDW, ZEUS)

## 2021-11-25 NOTE — PROVIDER CONTACT NOTE (OTHER) - SITUATION
Patient refused all oral 12pm meds (pt spat out) including aspirin, folic acid, isosorbide, famotidine and creon. Patient refusing oral intake at this time.

## 2021-11-25 NOTE — PROVIDER CONTACT NOTE (OTHER) - ASSESSMENT
Patient AxOx1. Patient still continuously agitated and restless, trying to pull at lines/tubings. Patient o2 at 87%, and constantly taking off nasal cannula. Patient still tachycardic on telemetry with HR in 110-120s.

## 2021-11-25 NOTE — PROGRESS NOTE ADULT - PROBLEM SELECTOR PLAN 12
Hospital Bundle  Fluids: NPO except meds until dysphagia screening, then DASH   Electrolytes: Replete K > 4, Mg > 2, Phos > 3  Nutrition: Diet DASH/TLC (If passes dysphagia screening)  PPX  ---VTE: LVX  ---GI: Home famotidine 20mg  ---Resp: I/S  Access: PIV  Code Status: FULL CODE  Dispo: Pending medical stabilization, further evaluation  -- Called ?PCP Jaylin Ken [phone] (see chart note 11/24)

## 2021-11-25 NOTE — PROVIDER CONTACT NOTE (OTHER) - ASSESSMENT
patient getting agitated, combative, pulling off gown sheets, ID band patient getting agitated, combative, pulling off gown sheets, ID band, tele monitor leads

## 2021-11-25 NOTE — PROGRESS NOTE ADULT - ATTENDING COMMENTS
89 yo F with Dm2, HTN, dementia, p/w chest pain, dyspnea, abdominal pain complicated by AMS (agitation suspected delirium), and TAYLOR. CT abdomen unremarkable. Trops flat. TTE pending. Afebrile, HR 80-100s, -120s recently, saturating well on RA. CT head overnight iso AMS without acute findings. Rising WBC, started on empiric abx w/ CTX and azithro (?pna given new cough), awaiting further culture data and continuing to evaluate for additional etiologies of rising WBC. Rising Cr iso reduced PO intake, started on IVF.

## 2021-11-26 LAB
ALBUMIN SERPL ELPH-MCNC: 3.2 G/DL — LOW (ref 3.3–5)
ALP SERPL-CCNC: 87 U/L — SIGNIFICANT CHANGE UP (ref 40–120)
ALT FLD-CCNC: 8 U/L — SIGNIFICANT CHANGE UP (ref 4–33)
ANION GAP SERPL CALC-SCNC: 13 MMOL/L — SIGNIFICANT CHANGE UP (ref 7–14)
APPEARANCE UR: ABNORMAL
AST SERPL-CCNC: 14 U/L — SIGNIFICANT CHANGE UP (ref 4–32)
BACTERIA # UR AUTO: ABNORMAL
BASE EXCESS BLDV CALC-SCNC: -1.7 MMOL/L — SIGNIFICANT CHANGE UP (ref -2–3)
BASOPHILS # BLD AUTO: 0.02 K/UL — SIGNIFICANT CHANGE UP (ref 0–0.2)
BASOPHILS NFR BLD AUTO: 0.1 % — SIGNIFICANT CHANGE UP (ref 0–2)
BILIRUB SERPL-MCNC: 0.4 MG/DL — SIGNIFICANT CHANGE UP (ref 0.2–1.2)
BILIRUB UR-MCNC: NEGATIVE — SIGNIFICANT CHANGE UP
BLOOD GAS VENOUS COMPREHENSIVE RESULT: SIGNIFICANT CHANGE UP
BUN SERPL-MCNC: 31 MG/DL — HIGH (ref 7–23)
CALCIUM SERPL-MCNC: 8.8 MG/DL — SIGNIFICANT CHANGE UP (ref 8.4–10.5)
CHLORIDE BLDV-SCNC: 95 MMOL/L — LOW (ref 96–108)
CHLORIDE SERPL-SCNC: 93 MMOL/L — LOW (ref 98–107)
CO2 BLDV-SCNC: 25 MMOL/L — SIGNIFICANT CHANGE UP (ref 22–26)
CO2 SERPL-SCNC: 24 MMOL/L — SIGNIFICANT CHANGE UP (ref 22–31)
COLOR SPEC: YELLOW — SIGNIFICANT CHANGE UP
CREAT SERPL-MCNC: 1.24 MG/DL — SIGNIFICANT CHANGE UP (ref 0.5–1.3)
DIFF PNL FLD: NEGATIVE — SIGNIFICANT CHANGE UP
EOSINOPHIL # BLD AUTO: 0.01 K/UL — SIGNIFICANT CHANGE UP (ref 0–0.5)
EOSINOPHIL NFR BLD AUTO: 0 % — SIGNIFICANT CHANGE UP (ref 0–6)
EPI CELLS # UR: 7 /HPF — HIGH (ref 0–5)
GAS PNL BLDV: 125 MMOL/L — LOW (ref 136–145)
GLUCOSE BLDC GLUCOMTR-MCNC: 184 MG/DL — HIGH (ref 70–99)
GLUCOSE BLDC GLUCOMTR-MCNC: 195 MG/DL — HIGH (ref 70–99)
GLUCOSE BLDC GLUCOMTR-MCNC: 210 MG/DL — HIGH (ref 70–99)
GLUCOSE BLDC GLUCOMTR-MCNC: 230 MG/DL — HIGH (ref 70–99)
GLUCOSE BLDC GLUCOMTR-MCNC: 280 MG/DL — HIGH (ref 70–99)
GLUCOSE BLDC GLUCOMTR-MCNC: 287 MG/DL — HIGH (ref 70–99)
GLUCOSE BLDC GLUCOMTR-MCNC: 406 MG/DL — HIGH (ref 70–99)
GLUCOSE BLDV-MCNC: 188 MG/DL — HIGH (ref 70–99)
GLUCOSE SERPL-MCNC: 194 MG/DL — HIGH (ref 70–99)
GLUCOSE UR QL: ABNORMAL
HCO3 BLDV-SCNC: 24 MMOL/L — SIGNIFICANT CHANGE UP (ref 22–29)
HCT VFR BLD CALC: 29.6 % — LOW (ref 34.5–45)
HCT VFR BLDA CALC: 31 % — LOW (ref 34.5–46.5)
HGB BLD CALC-MCNC: 10.4 G/DL — LOW (ref 11.5–15.5)
HGB BLD-MCNC: 10.4 G/DL — LOW (ref 11.5–15.5)
HYALINE CASTS # UR AUTO: 5 /LPF — SIGNIFICANT CHANGE UP (ref 0–7)
IANC: 16.82 K/UL — HIGH (ref 1.5–8.5)
IMM GRANULOCYTES NFR BLD AUTO: 1 % — SIGNIFICANT CHANGE UP (ref 0–1.5)
KETONES UR-MCNC: ABNORMAL
LACTATE BLDV-MCNC: 2.4 MMOL/L — HIGH (ref 0.5–2)
LEUKOCYTE ESTERASE UR-ACNC: ABNORMAL
LYMPHOCYTES # BLD AUTO: 10.8 % — LOW (ref 13–44)
LYMPHOCYTES # BLD AUTO: 2.21 K/UL — SIGNIFICANT CHANGE UP (ref 1–3.3)
MAGNESIUM SERPL-MCNC: 2.3 MG/DL — SIGNIFICANT CHANGE UP (ref 1.6–2.6)
MCHC RBC-ENTMCNC: 29.2 PG — SIGNIFICANT CHANGE UP (ref 27–34)
MCHC RBC-ENTMCNC: 35.1 GM/DL — SIGNIFICANT CHANGE UP (ref 32–36)
MCV RBC AUTO: 83.1 FL — SIGNIFICANT CHANGE UP (ref 80–100)
MONOCYTES # BLD AUTO: 1.11 K/UL — HIGH (ref 0–0.9)
MONOCYTES NFR BLD AUTO: 5.4 % — SIGNIFICANT CHANGE UP (ref 2–14)
NEUTROPHILS # BLD AUTO: 16.82 K/UL — HIGH (ref 1.8–7.4)
NEUTROPHILS NFR BLD AUTO: 82.7 % — HIGH (ref 43–77)
NITRITE UR-MCNC: NEGATIVE — SIGNIFICANT CHANGE UP
NRBC # BLD: 0 /100 WBCS — SIGNIFICANT CHANGE UP
NRBC # FLD: 0 K/UL — SIGNIFICANT CHANGE UP
PCO2 BLDV: 42 MMHG — SIGNIFICANT CHANGE UP (ref 39–42)
PH BLDV: 7.36 — SIGNIFICANT CHANGE UP (ref 7.32–7.43)
PH UR: 5.5 — SIGNIFICANT CHANGE UP (ref 5–8)
PHOSPHATE SERPL-MCNC: 4.1 MG/DL — SIGNIFICANT CHANGE UP (ref 2.5–4.5)
PLATELET # BLD AUTO: 360 K/UL — SIGNIFICANT CHANGE UP (ref 150–400)
PO2 BLDV: 38 MMHG — SIGNIFICANT CHANGE UP
POTASSIUM BLDV-SCNC: 3.9 MMOL/L — SIGNIFICANT CHANGE UP (ref 3.5–5.1)
POTASSIUM SERPL-MCNC: 4.1 MMOL/L — SIGNIFICANT CHANGE UP (ref 3.5–5.3)
POTASSIUM SERPL-SCNC: 4.1 MMOL/L — SIGNIFICANT CHANGE UP (ref 3.5–5.3)
PROT SERPL-MCNC: 6.1 G/DL — SIGNIFICANT CHANGE UP (ref 6–8.3)
PROT UR-MCNC: ABNORMAL
RBC # BLD: 3.56 M/UL — LOW (ref 3.8–5.2)
RBC # FLD: 14.8 % — HIGH (ref 10.3–14.5)
RBC CASTS # UR COMP ASSIST: 5 /HPF — HIGH (ref 0–4)
SAO2 % BLDV: 62.1 % — SIGNIFICANT CHANGE UP
SODIUM SERPL-SCNC: 130 MMOL/L — LOW (ref 135–145)
SP GR SPEC: 1.04 — SIGNIFICANT CHANGE UP (ref 1–1.05)
UROBILINOGEN FLD QL: SIGNIFICANT CHANGE UP
WBC # BLD: 20.37 K/UL — HIGH (ref 3.8–10.5)
WBC # FLD AUTO: 20.37 K/UL — HIGH (ref 3.8–10.5)
WBC UR QL: 6 /HPF — HIGH (ref 0–5)

## 2021-11-26 PROCEDURE — 99233 SBSQ HOSP IP/OBS HIGH 50: CPT | Mod: GC

## 2021-11-26 PROCEDURE — 99231 SBSQ HOSP IP/OBS SF/LOW 25: CPT | Mod: GC

## 2021-11-26 PROCEDURE — 99232 SBSQ HOSP IP/OBS MODERATE 35: CPT

## 2021-11-26 RX ORDER — INSULIN GLARGINE 100 [IU]/ML
16 INJECTION, SOLUTION SUBCUTANEOUS AT BEDTIME
Refills: 0 | Status: DISCONTINUED | OUTPATIENT
Start: 2021-11-26 | End: 2021-11-29

## 2021-11-26 RX ORDER — QUETIAPINE FUMARATE 200 MG/1
25 TABLET, FILM COATED ORAL AT BEDTIME
Refills: 0 | Status: DISCONTINUED | OUTPATIENT
Start: 2021-11-26 | End: 2021-11-29

## 2021-11-26 RX ORDER — SODIUM CHLORIDE 9 MG/ML
1000 INJECTION, SOLUTION INTRAVENOUS
Refills: 0 | Status: DISCONTINUED | OUTPATIENT
Start: 2021-11-26 | End: 2021-11-27

## 2021-11-26 RX ORDER — INSULIN LISPRO 100/ML
8 VIAL (ML) SUBCUTANEOUS
Refills: 0 | Status: DISCONTINUED | OUTPATIENT
Start: 2021-11-26 | End: 2021-11-29

## 2021-11-26 RX ORDER — INSULIN LISPRO 100/ML
8 VIAL (ML) SUBCUTANEOUS ONCE
Refills: 0 | Status: COMPLETED | OUTPATIENT
Start: 2021-11-26 | End: 2021-11-26

## 2021-11-26 RX ADMIN — Medication 8 UNIT(S): at 12:23

## 2021-11-26 RX ADMIN — Medication 3: at 12:23

## 2021-11-26 RX ADMIN — Medication 1 MILLIGRAM(S): at 11:33

## 2021-11-26 RX ADMIN — DORZOLAMIDE HYDROCHLORIDE 2 DROP(S): 20 SOLUTION/ DROPS OPHTHALMIC at 00:11

## 2021-11-26 RX ADMIN — DORZOLAMIDE HYDROCHLORIDE 2 DROP(S): 20 SOLUTION/ DROPS OPHTHALMIC at 09:21

## 2021-11-26 RX ADMIN — ISOSORBIDE MONONITRATE 30 MILLIGRAM(S): 60 TABLET, EXTENDED RELEASE ORAL at 11:33

## 2021-11-26 RX ADMIN — POLYETHYLENE GLYCOL 3350 17 GRAM(S): 17 POWDER, FOR SOLUTION ORAL at 22:38

## 2021-11-26 RX ADMIN — Medication 6 UNIT(S): at 07:39

## 2021-11-26 RX ADMIN — Medication 2: at 16:55

## 2021-11-26 RX ADMIN — QUETIAPINE FUMARATE 25 MILLIGRAM(S): 200 TABLET, FILM COATED ORAL at 22:39

## 2021-11-26 RX ADMIN — Medication 30 MILLIGRAM(S): at 05:58

## 2021-11-26 RX ADMIN — Medication 1 CAPSULE(S): at 17:39

## 2021-11-26 RX ADMIN — Medication 200 MILLIGRAM(S): at 17:46

## 2021-11-26 RX ADMIN — DORZOLAMIDE HYDROCHLORIDE 2 DROP(S): 20 SOLUTION/ DROPS OPHTHALMIC at 17:38

## 2021-11-26 RX ADMIN — Medication 1 CAPSULE(S): at 09:21

## 2021-11-26 RX ADMIN — Medication 1 CAPSULE(S): at 11:58

## 2021-11-26 RX ADMIN — CEFTRIAXONE 100 MILLIGRAM(S): 500 INJECTION, POWDER, FOR SOLUTION INTRAMUSCULAR; INTRAVENOUS at 13:00

## 2021-11-26 RX ADMIN — SODIUM CHLORIDE 75 MILLILITER(S): 9 INJECTION, SOLUTION INTRAVENOUS at 14:42

## 2021-11-26 RX ADMIN — FAMOTIDINE 20 MILLIGRAM(S): 10 INJECTION INTRAVENOUS at 11:33

## 2021-11-26 RX ADMIN — SENNA PLUS 2 TABLET(S): 8.6 TABLET ORAL at 22:38

## 2021-11-26 RX ADMIN — ENOXAPARIN SODIUM 40 MILLIGRAM(S): 100 INJECTION SUBCUTANEOUS at 11:33

## 2021-11-26 RX ADMIN — Medication 2: at 07:38

## 2021-11-26 RX ADMIN — AZITHROMYCIN 255 MILLIGRAM(S): 500 TABLET, FILM COATED ORAL at 11:56

## 2021-11-26 RX ADMIN — Medication 81 MILLIGRAM(S): at 11:51

## 2021-11-26 RX ADMIN — Medication 6 MILLIGRAM(S): at 22:38

## 2021-11-26 RX ADMIN — Medication 8 UNIT(S): at 16:55

## 2021-11-26 RX ADMIN — INSULIN GLARGINE 16 UNIT(S): 100 INJECTION, SOLUTION SUBCUTANEOUS at 22:39

## 2021-11-26 RX ADMIN — Medication 30 MILLIGRAM(S): at 17:39

## 2021-11-26 NOTE — PROGRESS NOTE ADULT - PROBLEM SELECTOR PLAN 12
Hospital Bundle  Fluids: NPO except meds until dysphagia screening, then DASH   Electrolytes: Replete K > 4, Mg > 2, Phos > 3  Nutrition: Diet DASH/TLC (If passes dysphagia screening)  PPX  ---VTE: LVX  ---GI: Home famotidine 20mg  ---Resp: I/S  Access: PIV  Code Status: FULL CODE  Dispo: Pending medical stabilization, further evaluation  -- Called ?PCP Jaylin Ken [phone] (see chart note 11/24)

## 2021-11-26 NOTE — PROGRESS NOTE ADULT - PROBLEM SELECTOR PLAN 10
History by documentation, unclear if prescribed a statin or other lipid-lowering medication as an OP based on pharmacy fill history. LDL 91, HDL 50, TCholesterol 159, TAG 91. Not alarmingly abnormal, not obviously hyperlipidemic. Goal LDL with DM may be lower, but given her age, wouldn't initiate this necessarily (also high risk for polypharmacy).  - CTM

## 2021-11-26 NOTE — SWALLOW BEDSIDE ASSESSMENT ADULT - SWALLOW EVAL: DIAGNOSIS
1) Mild oral dysphagia for puree, moderately thick fluids, mildly thick fluids, and thin fluids marked by reduced utensil stripping, prolonged bolus manipulation, and reduced coordination. Mildly delayed posterior transfer/transport. Adequate oral clearance noted. 2) Moderate to severe pharyngeal dysphagia marked by suspected delay in pharyngeal swallow trigger. Hyolaryngeal elevation noted upon palpation. Pt with immediate cough and "wet" vocal quality following trials of mildly thick and thin fluids suggestive of possible airway penetration/aspiration. No overt s/sx of penetration/aspiration noted for puree and moderately thick fluids. SLP attempted trial of solid, however pt with refusal to accept solid consistency.

## 2021-11-26 NOTE — SWALLOW BEDSIDE ASSESSMENT ADULT - SWALLOW EVAL: RECOMMENDED FEEDING/EATING TECHNIQUES
allow for swallow between intakes/oral hygiene/position upright (90 degrees)/small sips/bites
English

## 2021-11-26 NOTE — PROGRESS NOTE ADULT - ATTENDING COMMENTS
The patient was seen and examined with the Cardiology Consultation Teaching Service.   [employer] video  [id number].    No overnight events    The patient was somewhat lethargic during our interview.   She reported continued cough, dsypnea, and chest pain.    Comfortable-appearing woman in no acute distress  Lethargic, unclear orientation  Afebrile  Vital signs stable  JVP is not elevated  Incomplete pulmonary examination due to poor inspiratory effort  Normal heart sounds  Soft, non-tender, non-distended abdomen  Extremities are warm and perfused  Trace peripheral edema     Leukocytosis to 20K with neutrophil predominance  Normocytic anemia  Hyponatremia, hypochloremia  Azotemia, GFR 36    hs-troponin normal  pro-    Impression and Recommendations:  89-year-old woman with diabetes and hypertension who presented with chest pain, dyspnea, and abdominal pain.     I do not suspect that this patient's presentation or ongoing chest pain is cardiac in origin. Her normal hs-troponin and relatively normal pro-BNP given her age are reassuring from a cardiac perspective. Epidemiologically, this patient is likely to have underlying atherosclerotic disease, but her acute presentation is more consistent with an underlying infectious etiology.    Echocardiography is unlikely to change this patient's management plan at present.  Please call cardiology with additional questions or concerns.    Haresh Freeman MD  Cardiology  [phone]

## 2021-11-26 NOTE — PROGRESS NOTE ADULT - PROBLEM SELECTOR PLAN 4
Pt has non-specific abdominal pain with imaging notable for hiatal hernia and scattered, ? old granulomatous disease. UA shows microscopic hematuria w/o WBC, bacteria, LE, or nitrates, which is a/w a broad differential that includes trauma (less likely but reportedly may have fallen), nephrolithiasis (which may also cause abd pain, n/v).   DDx:  - Nephrolithiasis: microscopic hematuria w/ abd pain/n/v; no stone identified on CT; no hydronephrosis  - SBO: h/o abdominal surgery but CT w/o obstruction, pt no longer vomiting; less likely   - Mesentric Ischemia: elderly woman; PCP says she has "cardiac history" but unkown specific diagnoses or recent treatments    Diagnostics  - Lipase WNL    Therapeutics  - C/W Ondansetron 4mg PO Q6H PRN Pt has non-specific abdominal pain with imaging notable for hiatal hernia and scattered, ? old granulomatous disease. UA shows microscopic hematuria w/o WBC, bacteria, LE, or nitrates, which is a/w a broad differential that includes trauma (less likely but reportedly may have fallen), nephrolithiasis (which may also cause abd pain, n/v).   DDx:  - Nephrolithiasis: microscopic hematuria w/ abd pain/n/v; no stone identified on CT; no hydronephrosis  - SBO: h/o abdominal surgery but CT w/o obstruction, pt no longer vomiting; less likely   - Mesentric Ischemia: elderly woman; PCP says she has "cardiac history" but unknown specific diagnoses or recent treatments    Diagnostics  - Lipase WNL    Therapeutics  - C/W Ondansetron 4mg PO Q6H PRN Pt has non-specific abdominal pain with imaging notable for hiatal hernia and scattered, ? old granulomatous disease. UA shows microscopic hematuria w/o WBC, bacteria, LE, or nitrates, which is a/w a broad differential that includes trauma (less likely but reportedly may have fallen), nephrolithiasis (which may also cause abd pain, n/v).   DDx:  - Nephrolithiasis: microscopic hematuria w/ abd pain/n/v; no stone identified on CT; no hydronephrosis  - SBO: h/o abdominal surgery but CT w/o obstruction, pt no longer vomiting; less likely   - Mesentric Ischemia: elderly woman; PCP says she has "cardiac history" but unknown specific diagnoses or recent treatments; no bloody BMs, abdomen not alarming. May be 2/2 constipation at this point given no BMs x3-4d.     Diagnostics  - Lipase WNL  - Holding off on repeat imaging at this time    Therapeutics  - C/W Ondansetron 4mg PO Q6H PRN  - C/W Bowel Regimen, may need enema

## 2021-11-26 NOTE — PROGRESS NOTE ADULT - SUBJECTIVE AND OBJECTIVE BOX
Patient seen and examined at bedside.    Overnight Events: No acute events overnight. Has periods of confusion/agitation throughout the night per documentation          Current Meds:  aspirin enteric coated 81 milliGRAM(s) Oral daily  azithromycin  IVPB 500 milliGRAM(s) IV Intermittent every 24 hours  azithromycin  IVPB      cefTRIAXone   IVPB 1000 milliGRAM(s) IV Intermittent every 24 hours  cefTRIAXone   IVPB      dextrose 40% Gel 15 Gram(s) Oral once  dextrose 5%. 1000 milliLiter(s) IV Continuous <Continuous>  dextrose 5%. 1000 milliLiter(s) IV Continuous <Continuous>  dextrose 50% Injectable 25 Gram(s) IV Push once  dextrose 50% Injectable 12.5 Gram(s) IV Push once  dextrose 50% Injectable 25 Gram(s) IV Push once  dorzolamide 2% Ophthalmic Solution 2 Drop(s) Both EYES every 8 hours  enoxaparin Injectable 40 milliGRAM(s) SubCutaneous daily  famotidine    Tablet 20 milliGRAM(s) Oral daily  folic acid 1 milliGRAM(s) Oral daily  glucagon  Injectable 1 milliGRAM(s) IntraMuscular once  guaiFENesin Oral Liquid (Sugar-Free) 100 milliGRAM(s) Oral every 6 hours PRN  insulin glargine Injectable (LANTUS) 14 Unit(s) SubCutaneous at bedtime  insulin lispro (ADMELOG) corrective regimen sliding scale   SubCutaneous three times a day before meals  insulin lispro Injectable (ADMELOG) 6 Unit(s) SubCutaneous three times a day before meals  isosorbide   mononitrate ER Tablet (IMDUR) 30 milliGRAM(s) Oral daily  lactated ringers. 1000 milliLiter(s) IV Continuous <Continuous>  melatonin 6 milliGRAM(s) Oral at bedtime  NIFEdipine XL 30 milliGRAM(s) Oral two times a day  ondansetron    Tablet 4 milliGRAM(s) Oral daily PRN  pancrelipase  (CREON 24,000 Lipase Units) 1 Capsule(s) Oral three times a day with meals  polyethylene glycol 3350 17 Gram(s) Oral at bedtime  senna 2 Tablet(s) Oral at bedtime      PAST MEDICAL & SURGICAL HISTORY:  Diabetes mellitus    Hypertension    Hyperlipidemia        Vitals:  T(F): 98 (11-26), Max: 99 (11-25)  HR: 76 (11-26) (76 - 109)  BP: 123/60 (11-26) (119/52 - 142/48)  RR: 18 (11-26)  SpO2: 96% (11-26)  I&O's Summary      PHYSICAL EXAM:  GENERAL: NAD, well-groomed, well-developed  HEAD:  Atraumatic, Normocephalic  EYES: EOMI, PERRLA, conjunctiva and sclera clear  ENMT: No tonsillar erythema, exudates, or enlargement; Moist mucous membranes, Good dentition, No lesions  NECK: Supple, No JVD, Normal thyroid  NERVOUS SYSTEM:  Alert & Oriented X0, poor concentration; Motor Strength 5/5 B/L upper and lower extremities; DTRs 2+ intact and symmetric  CHEST/LUNG: Clear to percussion bilaterally; No rales, rhonchi, wheezing, or rubs  HEART: Regular rate and rhythm; No murmurs, rubs, or gallops  ABDOMEN: Soft, Nontender, Nondistended; Bowel sounds present  EXTREMITIES:  2+ Peripheral Pulses, B/L LE edema R>L, No clubbing, cyanosis,   SKIN: No rashes or lesions                          10.4   20.37 )-----------( 360      ( 26 Nov 2021 07:13 )             29.6     11-25    126<L>  |  90<L>  |  30<H>  ----------------------------<  191<H>  3.9   |  24  |  1.56<H>    Ca    9.2      25 Nov 2021 09:37  Phos  3.8     11-25  Mg     2.20     11-25    TPro  6.8  /  Alb  3.6  /  TBili  0.4  /  DBili  x   /  AST  14  /  ALT  8   /  AlkPhos  88  11-25          Serum Pro-Brain Natriuretic Peptide: 259 pg/mL (11-23 @ 10:27)          New ECG(s): Personally reviewed    Echo:    Stress Testing:     Cath:    Imaging:    Interpretation of Telemetry:   Patient seen and examined at bedside.    Overnight Events: No acute events overnight. Has periods of confusion/agitation throughout the night per documentation.          Current Meds:  aspirin enteric coated 81 milliGRAM(s) Oral daily  azithromycin  IVPB 500 milliGRAM(s) IV Intermittent every 24 hours  azithromycin  IVPB      cefTRIAXone   IVPB 1000 milliGRAM(s) IV Intermittent every 24 hours  cefTRIAXone   IVPB      dextrose 40% Gel 15 Gram(s) Oral once  dextrose 5%. 1000 milliLiter(s) IV Continuous <Continuous>  dextrose 5%. 1000 milliLiter(s) IV Continuous <Continuous>  dextrose 50% Injectable 25 Gram(s) IV Push once  dextrose 50% Injectable 12.5 Gram(s) IV Push once  dextrose 50% Injectable 25 Gram(s) IV Push once  dorzolamide 2% Ophthalmic Solution 2 Drop(s) Both EYES every 8 hours  enoxaparin Injectable 40 milliGRAM(s) SubCutaneous daily  famotidine    Tablet 20 milliGRAM(s) Oral daily  folic acid 1 milliGRAM(s) Oral daily  glucagon  Injectable 1 milliGRAM(s) IntraMuscular once  guaiFENesin Oral Liquid (Sugar-Free) 100 milliGRAM(s) Oral every 6 hours PRN  insulin glargine Injectable (LANTUS) 14 Unit(s) SubCutaneous at bedtime  insulin lispro (ADMELOG) corrective regimen sliding scale   SubCutaneous three times a day before meals  insulin lispro Injectable (ADMELOG) 6 Unit(s) SubCutaneous three times a day before meals  isosorbide   mononitrate ER Tablet (IMDUR) 30 milliGRAM(s) Oral daily  lactated ringers. 1000 milliLiter(s) IV Continuous <Continuous>  melatonin 6 milliGRAM(s) Oral at bedtime  NIFEdipine XL 30 milliGRAM(s) Oral two times a day  ondansetron    Tablet 4 milliGRAM(s) Oral daily PRN  pancrelipase  (CREON 24,000 Lipase Units) 1 Capsule(s) Oral three times a day with meals  polyethylene glycol 3350 17 Gram(s) Oral at bedtime  senna 2 Tablet(s) Oral at bedtime      PAST MEDICAL & SURGICAL HISTORY:  Diabetes mellitus    Hypertension    Hyperlipidemia        Vitals:  T(F): 98 (11-26), Max: 99 (11-25)  HR: 76 (11-26) (76 - 109)  BP: 123/60 (11-26) (119/52 - 142/48)  RR: 18 (11-26)  SpO2: 96% (11-26)  I&O's Summary      PHYSICAL EXAM:  GENERAL: NAD, well-groomed, well-developed  HEAD:  Atraumatic, Normocephalic  EYES: EOMI, PERRLA, conjunctiva and sclera clear  ENMT: No tonsillar erythema, exudates, or enlargement; Moist mucous membranes, Good dentition, No lesions  NECK: Supple, No JVD, Normal thyroid  NERVOUS SYSTEM:  Alert & Oriented X0, poor concentration; Motor Strength 5/5 B/L upper and lower extremities; DTRs 2+ intact and symmetric  CHEST/LUNG: Clear to percussion bilaterally; No rales, rhonchi, wheezing, or rubs  HEART: Regular rate and rhythm; No murmurs, rubs, or gallops  ABDOMEN: Soft, Nontender, Nondistended; Bowel sounds present  EXTREMITIES:  2+ Peripheral Pulses, B/L LE edema R>L, No clubbing, cyanosis,   SKIN: No rashes or lesions                          10.4   20.37 )-----------( 360      ( 26 Nov 2021 07:13 )             29.6     11-25    126<L>  |  90<L>  |  30<H>  ----------------------------<  191<H>  3.9   |  24  |  1.56<H>    Ca    9.2      25 Nov 2021 09:37  Phos  3.8     11-25  Mg     2.20     11-25    TPro  6.8  /  Alb  3.6  /  TBili  0.4  /  DBili  x   /  AST  14  /  ALT  8   /  AlkPhos  88  11-25          Serum Pro-Brain Natriuretic Peptide: 259 pg/mL (11-23 @ 10:27)          New ECG(s): Personally reviewed    Echo:    Stress Testing:     Cath:    Imaging:    Interpretation of Telemetry:

## 2021-11-26 NOTE — PROGRESS NOTE ADULT - ASSESSMENT
Ms. Estrada is a Ecuadorean-speaking 89F with T2DM (A1c 8.2%), HTN, Dementia (by collateral report) who presents complaining of chest pressure, abdominal pain, and nausea for 1 week - 1 month intermittently. Endocrine consulted for T2DM management.    Adequately controlled T2DM with hyperglycemia  A1c 8.2%  -Increase Lantus to 16 units at bedtime. DO NOT HOLD IF NPO.  -increase Admelog to 8 units TID pre-meal. HOLD IF NPO.  -Use low correction scale pre-meal  -No correction scale at bedtime  -Fingerstick BG before meals and bedtime and 3am  -Goal -250 for this elderly patient with multiple medical comorbidities and dementia- avoid hypoglycemia is the goal!!!  -Carbohydrate consistent diet  Discharge plan:  home regimen:  Per recent Endocrinology outpatient records (11/5/21): taking Tresiba 20-22 units qhs. Prescribed correction scale pre-meal as follows: before breakfast is to take 10-14-16-92-61-47-20-22 units Novolog; before lunch 9-10-12-14-15-16-17-18-20 and before dinner is to take 7-8-10-03-28-63-14-15 Novolog. Unclear what the BG cutoffs are for the scales. Apparently patient frequently missing pre-meal insulin if FS in low-mid 100s. No novolog at bedtime and encouraged to have bedtime snack.    -Can discharge patient home on basal/bolus insulin if she is safe to self-inject insulin at home or has adequate help.   Full plan TBD- would need to determine where patient is going, who will be assisting with care and able to give insulin injections.  Please call endocrine once this information has been obtained so that a safe dc plan can be decided and coordianted  [phone]. .  Concern that current clinical status will prohibit her from being able to do this.    reccomend social work/case management involvement. Final regimen pending clinical course.  -Recommend routine outpatient ophthalmology and endocrinology f/u. Sees Dr. Espinal for Endocrine    HTN  -Outpatient goal BP <140/80 given age   On Imdur and nifedipine. Management per primary team.    HLD  -Consider risks/benefits of statin use in this elderly patient, would recommend not initiating    Caitlyn Still MD  Attending Physician   Department of Endocrinology, Diabetes and Metabolism   Pager: [phone]    If before 9AM or after 5PM, or on weekends/holidays, please call the Endocrine answering service for assistance ([phone]).  For nonurgent matters, please email [email or web address] for assistance.      Ms. Estrada is a Tanzanian-speaking 89F with T2DM (A1c 8.2%), HTN, Dementia (by collateral report) who presents complaining of chest pressure, abdominal pain, and nausea for 1 week - 1 month intermittently. Endocrine consulted for T2DM management.    Adequately controlled T2DM with hyperglycemia  A1c 8.2%  -Increase Lantus to 16 units at bedtime. DO NOT HOLD IF NPO.  -increase Admelog to 8 units TID pre-meal. HOLD IF NPO.  -Use low correction scale pre-meal  -No correction scale at bedtime  -Fingerstick BG before meals and bedtime and 3am  -Goal -250 for this elderly patient with multiple medical comorbidities and dementia- avoiding hypoglycemia is the goal  -Carbohydrate consistent diet  Discharge plan:  home regimen:  Per recent Endocrinology outpatient records (11/5/21): taking Tresiba 20-22 units qhs. Prescribed correction scale pre-meal as follows: before breakfast is to take 10-14-16-03-97-92-20-22 units Novolog; before lunch 9-10-12-14-15-16-17-18-20 and before dinner is to take 7-8-10-32-68-43-14-15 Novolog. Unclear what the BG cutoffs are for the scales. Apparently patient frequently missing pre-meal insulin if FS in low-mid 100s. No novolog at bedtime and encouraged to have bedtime snack.    -Can discharge patient home on basal/bolus insulin if she is safe to self-inject insulin at home or has adequate help.   Full plan TBD- would need to determine where patient is going, who will be assisting with care and able to give insulin injections.  Please call endocrine once this information has been obtained so that a safe dc plan can be decided and coordianted  [phone]. .  Concern that current clinical status will prohibit her from being able to do this.    reccomend social work/case management involvement. Final regimen pending clinical course.  -Recommend routine outpatient ophthalmology and endocrinology f/u. Sees Dr. Espinal for Endocrine    HTN  -Outpatient goal BP <140/80 given age   On Imdur and nifedipine. Management per primary team.    HLD  -Consider risks/benefits of statin use in this elderly patient, would recommend not initiating    insulin orders adjusted as above    Caitlyn Still MD  Attending Physician   Department of Endocrinology, Diabetes and Metabolism   Pager: [phone]    If before 9AM or after 5PM, or on weekends/holidays, please call the Endocrine answering service for assistance ([phone]).  For nonurgent matters, please email [email or web address] for assistance.

## 2021-11-26 NOTE — PROGRESS NOTE ADULT - PROBLEM SELECTOR PLAN 2
Intermittent reports of agitation, not engaging with staff or with provider teams. Collateral hx obtained from PCP indicates "if any dementia, it's very, very mild". Most likely hospital delirium in an elderly patient with ?polypharmacy, but given symptomatology, also high risk for infectious and anatomic process. Other DDx includes:  - metabolic derangements (incl. hyponatremia, hyperglycemia - though neither is significantly abnormal enough that would cause significant symptoms)  - infectious/toxic metabolic encephalopathy (leukocytosis, cough, though no clear PNA on CXR, less likely abdominal but monitoring closely)    Diagnostics  - NC HCT w/o acute intracranial pathology  - infectious work-up as below  - hyponatremia, hyperglycemia management as below    Therapeutics  - Delirium Precuations (frequent re-orientation, using  every time, windows open during the day) Intermittent reports of agitation, not engaging with staff or with provider teams. Collateral hx obtained from PCP indicates "if any dementia, it's very, very mild". Most likely hospital delirium in an elderly patient with ?polypharmacy, but given symptomatology, may be TME. Improved this morning despite o/n haldol. Other DDx includes:  - metabolic derangements (incl. hyponatremia, hyperglycemia - though neither is significantly abnormal enough that would cause significant symptoms)  - infectious/toxic metabolic encephalopathy (leukocytosis, cough, though no clear PNA on CXR, less likely abdominal but monitoring closely)    Diagnostics  - NC HCT w/o acute intracranial pathology  - infectious work-up as below  - hyponatremia, hyperglycemia management as below    Therapeutics  - Delirium Precuations (frequent re-orientation, using  every time, windows open during the day)  -  -> Intermittent reports of agitation, not engaging with staff or with provider teams. Collateral hx obtained from PCP indicates "if any dementia, it's very, very mild". Most likely hospital delirium in an elderly patient with ?polypharmacy, but given symptomatology, may be TME. Improving this morning even with haldol o/n. Borderline QTc after repeat haldol dosing over nights, would start a less QT prolonging medication.    Diagnostics  - NC HCT w/o acute intracranial pathology  - infectious work-up as below  - hyponatremia, hyperglycemia management as below    Therapeutics  - Delirium Precuations (frequent re-orientation, using  every time, windows open during the day)  - C/W Melatonin 6mg PO QHS  - START Seroquel 25mg PO QHS

## 2021-11-26 NOTE — PROGRESS NOTE ADULT - SUBJECTIVE AND OBJECTIVE BOX
Sammy Salcedo M.D.  [facility] PGY-1    PROGRESS NOTE:     Patient is a 89y old  Female who presents with a chief complaint of Chest Pain (2021 06:39)      -OVERNIGHT EVENTS-      -SUBJECTIVE-      MEDICATIONS  (STANDING):  aspirin enteric coated 81 milliGRAM(s) Oral daily  azithromycin  IVPB 500 milliGRAM(s) IV Intermittent every 24 hours  azithromycin  IVPB      cefTRIAXone   IVPB 1000 milliGRAM(s) IV Intermittent every 24 hours  cefTRIAXone   IVPB      dextrose 40% Gel 15 Gram(s) Oral once  dextrose 5%. 1000 milliLiter(s) (50 mL/Hr) IV Continuous <Continuous>  dextrose 5%. 1000 milliLiter(s) (100 mL/Hr) IV Continuous <Continuous>  dextrose 50% Injectable 25 Gram(s) IV Push once  dextrose 50% Injectable 12.5 Gram(s) IV Push once  dextrose 50% Injectable 25 Gram(s) IV Push once  dorzolamide 2% Ophthalmic Solution 2 Drop(s) Both EYES every 8 hours  enoxaparin Injectable 40 milliGRAM(s) SubCutaneous daily  famotidine    Tablet 20 milliGRAM(s) Oral daily  folic acid 1 milliGRAM(s) Oral daily  glucagon  Injectable 1 milliGRAM(s) IntraMuscular once  insulin glargine Injectable (LANTUS) 14 Unit(s) SubCutaneous at bedtime  insulin lispro (ADMELOG) corrective regimen sliding scale   SubCutaneous three times a day before meals  insulin lispro Injectable (ADMELOG) 6 Unit(s) SubCutaneous three times a day before meals  isosorbide   mononitrate ER Tablet (IMDUR) 30 milliGRAM(s) Oral daily  lactated ringers. 1000 milliLiter(s) (100 mL/Hr) IV Continuous <Continuous>  melatonin 6 milliGRAM(s) Oral at bedtime  NIFEdipine XL 30 milliGRAM(s) Oral two times a day  pancrelipase  (CREON 24,000 Lipase Units) 1 Capsule(s) Oral three times a day with meals  polyethylene glycol 3350 17 Gram(s) Oral at bedtime  senna 2 Tablet(s) Oral at bedtime    MEDICATIONS  (PRN):  guaiFENesin Oral Liquid (Sugar-Free) 100 milliGRAM(s) Oral every 6 hours PRN Cough  ondansetron    Tablet 4 milliGRAM(s) Oral daily PRN Nausea and/or Vomiting      -OBJECTIVE-    CAPILLARY BLOOD GLUCOSE      POCT Blood Glucose.: 195 mg/dL (2021 21:27)  POCT Blood Glucose.: 133 mg/dL (2021 16:43)  POCT Blood Glucose.: 152 mg/dL (2021 11:49)  POCT Blood Glucose.: 210 mg/dL (2021 07:06)    I&O's Summary      VITAL SIGNS  Vital Signs Last 24 Hrs  T(C): 36.7 (2021 05:58), Max: 37.2 (2021 11:25)  T(F): 98 (2021 05:58), Max: 99 (2021 11:25)  HR: 76 (2021 05:58) (76 - 109)  BP: 123/60 (2021 05:58) (119/52 - 142/48)  BP(mean): --  RR: 18 (2021 05:58) (16 - 20)  SpO2: 96% (2021 05:58) (93% - 96%)    PHYSICAL EXAM:  GENERAL/CONSTITUTIONAL: NAD, Awake, AOx3 (person, place, time)  HEENT: NCAT  ---EYES: no scleral icterus, EOMI, PERRLA  ---ENMT: MMM,   ---NECK: No palpable masses; no thyromegaly  CARDIO: RRR. Normal S1/S2, no m/r/g.  RESP: Normal respiratory effort; CTAB, no w/r/r  ABD: Soft, NTND; +BS.   : No CVAT.   MSK: No obvious deformity or ROM deficit.  ---EXTREMITIES: No peripheral edema. Peripheral pulses 2+ x4.  SKIN: Warm, dry. No rashes.   NEURO: Moves all four extremities spontaneously  PSYCH: Appropriate mood & affect. No VH/AH. No SI/HI.    LABS:                        11.9   26.18 )-----------( 381      ( 2021 09:37 )             33.8         126<L>  |  90<L>  |  30<H>  ----------------------------<  191<H>  3.9   |  24  |  1.56<H>    Ca    9.2      2021 09:37  Phos  3.8       Mg     2.20         TPro  6.8  /  Alb  3.6  /  TBili  0.4  /  DBili  x   /  AST  14  /  ALT  8   /  AlkPhos  88            Urinalysis Basic - ( 2021 02:20 )    Color: Yellow / Appearance: Turbid / S.037 / pH: x  Gluc: x / Ketone: Trace  / Bili: Negative / Urobili: <2 mg/dL   Blood: x / Protein: 30 mg/dL / Nitrite: Negative   Leuk Esterase: Moderate / RBC: 5 /HPF / WBC 6 /HPF   Sq Epi: x / Non Sq Epi: 7 /HPF / Bacteria: Moderate        MICROBIOLOGY    Culture - Blood (collected 2021 19:40)  Source: .Blood Blood-Peripheral  Preliminary Report (2021 20:01):    No growth to date.        IMAGING        COORDINATION OF CARE:  ---PCP:  ---Consultants:   Sammy Salcedo M.D.  [facility] PGY-1    PROGRESS NOTE:     Patient is a 89y old  Female who presents with a chief complaint of Chest Pain (2021 06:39)      -OVERNIGHT EVENTS-  - Again agitated overnight, received 3.5mg Haldol (1mg then 2.5mg, last 0400)    -SUBJECTIVE-      MEDICATIONS  (STANDING):  aspirin enteric coated 81 milliGRAM(s) Oral daily  azithromycin  IVPB 500 milliGRAM(s) IV Intermittent every 24 hours  azithromycin  IVPB      cefTRIAXone   IVPB 1000 milliGRAM(s) IV Intermittent every 24 hours  cefTRIAXone   IVPB      dextrose 40% Gel 15 Gram(s) Oral once  dextrose 5%. 1000 milliLiter(s) (50 mL/Hr) IV Continuous <Continuous>  dextrose 5%. 1000 milliLiter(s) (100 mL/Hr) IV Continuous <Continuous>  dextrose 50% Injectable 25 Gram(s) IV Push once  dextrose 50% Injectable 12.5 Gram(s) IV Push once  dextrose 50% Injectable 25 Gram(s) IV Push once  dorzolamide 2% Ophthalmic Solution 2 Drop(s) Both EYES every 8 hours  enoxaparin Injectable 40 milliGRAM(s) SubCutaneous daily  famotidine    Tablet 20 milliGRAM(s) Oral daily  folic acid 1 milliGRAM(s) Oral daily  glucagon  Injectable 1 milliGRAM(s) IntraMuscular once  insulin glargine Injectable (LANTUS) 14 Unit(s) SubCutaneous at bedtime  insulin lispro (ADMELOG) corrective regimen sliding scale   SubCutaneous three times a day before meals  insulin lispro Injectable (ADMELOG) 6 Unit(s) SubCutaneous three times a day before meals  isosorbide   mononitrate ER Tablet (IMDUR) 30 milliGRAM(s) Oral daily  lactated ringers. 1000 milliLiter(s) (100 mL/Hr) IV Continuous <Continuous>  melatonin 6 milliGRAM(s) Oral at bedtime  NIFEdipine XL 30 milliGRAM(s) Oral two times a day  pancrelipase  (CREON 24,000 Lipase Units) 1 Capsule(s) Oral three times a day with meals  polyethylene glycol 3350 17 Gram(s) Oral at bedtime  senna 2 Tablet(s) Oral at bedtime    MEDICATIONS  (PRN):  guaiFENesin Oral Liquid (Sugar-Free) 100 milliGRAM(s) Oral every 6 hours PRN Cough  ondansetron    Tablet 4 milliGRAM(s) Oral daily PRN Nausea and/or Vomiting      -OBJECTIVE-    CAPILLARY BLOOD GLUCOSE      POCT Blood Glucose.: 195 mg/dL (2021 21:27)  POCT Blood Glucose.: 133 mg/dL (2021 16:43)  POCT Blood Glucose.: 152 mg/dL (2021 11:49)  POCT Blood Glucose.: 210 mg/dL (2021 07:06)    I&O's Summary      VITAL SIGNS  Vital Signs Last 24 Hrs  T(C): 36.7 (2021 05:58), Max: 37.2 (2021 11:25)  T(F): 98 (2021 05:58), Max: 99 (2021 11:25)  HR: 76 (2021 05:58) (76 - 109)  BP: 123/60 (2021 05:58) (119/52 - 142/48)  BP(mean): --  RR: 18 (2021 05:58) (16 - 20)  SpO2: 96% (2021 05:58) (93% - 96%)    PHYSICAL EXAM:  GENERAL/CONSTITUTIONAL: NAD, Awake, AOx3 (person, place, time)  HEENT: NCAT  ---EYES: no scleral icterus, EOMI, PERRLA  ---ENMT: MMM,   ---NECK: No palpable masses; no thyromegaly  CARDIO: RRR. Normal S1/S2, no m/r/g.  RESP: Normal respiratory effort; CTAB, no w/r/r  ABD: Soft, NTND; +BS.   : No CVAT.   MSK: No obvious deformity or ROM deficit.  ---EXTREMITIES: No peripheral edema. Peripheral pulses 2+ x4.  SKIN: Warm, dry. No rashes.   NEURO: Moves all four extremities spontaneously  PSYCH: Appropriate mood & affect. No VH/AH. No SI/HI.    LABS:                        11.9   26.18 )-----------( 381      ( 2021 09:37 )             33.8     11-25    126<L>  |  90<L>  |  30<H>  ----------------------------<  191<H>  3.9   |  24  |  1.56<H>    Ca    9.2      2021 09:37  Phos  3.8       Mg     2.20         TPro  6.8  /  Alb  3.6  /  TBili  0.4  /  DBili  x   /  AST  14  /  ALT  8   /  AlkPhos  88            Urinalysis Basic - ( 2021 02:20 )    Color: Yellow / Appearance: Turbid / S.037 / pH: x  Gluc: x / Ketone: Trace  / Bili: Negative / Urobili: <2 mg/dL   Blood: x / Protein: 30 mg/dL / Nitrite: Negative   Leuk Esterase: Moderate / RBC: 5 /HPF / WBC 6 /HPF   Sq Epi: x / Non Sq Epi: 7 /HPF / Bacteria: Moderate        MICROBIOLOGY    Culture - Blood (collected 2021 19:40)  Source: .Blood Blood-Peripheral  Preliminary Report (2021 20:01):    No growth to date.        IMAGING        COORDINATION OF CARE:  ---PCP:  ---Consultants:   Sammy Salcedo M.D.  [facility] PGY-1    PROGRESS NOTE:     Patient is a 89y old  Female who presents with a chief complaint of Chest Pain (2021 06:39)      -OVERNIGHT EVENTS-  - Again agitated overnight, received 3.5mg Haldol (1mg then 2.5mg, last 400)    -SUBJECTIVE-  Cam[place]  #[id number] (Tami)  Ms. Estrada is AOx1 today. She thinks she's laying down in a car (but appropriately looks around the room to assess her surroundings. She says that she has some abdominal pain this morning and is worried about her heart. She does not localize her abdominal pain; she hasn't had any vomiting; she still has not had a bowel movement.     MEDICATIONS  (STANDING):  aspirin enteric coated 81 milliGRAM(s) Oral daily  azithromycin  IVPB 500 milliGRAM(s) IV Intermittent every 24 hours  azithromycin  IVPB      cefTRIAXone   IVPB 1000 milliGRAM(s) IV Intermittent every 24 hours  cefTRIAXone   IVPB      dextrose 40% Gel 15 Gram(s) Oral once  dextrose 5%. 1000 milliLiter(s) (50 mL/Hr) IV Continuous <Continuous>  dextrose 5%. 1000 milliLiter(s) (100 mL/Hr) IV Continuous <Continuous>  dextrose 50% Injectable 25 Gram(s) IV Push once  dextrose 50% Injectable 12.5 Gram(s) IV Push once  dextrose 50% Injectable 25 Gram(s) IV Push once  dorzolamide 2% Ophthalmic Solution 2 Drop(s) Both EYES every 8 hours  enoxaparin Injectable 40 milliGRAM(s) SubCutaneous daily  famotidine    Tablet 20 milliGRAM(s) Oral daily  folic acid 1 milliGRAM(s) Oral daily  glucagon  Injectable 1 milliGRAM(s) IntraMuscular once  insulin glargine Injectable (LANTUS) 14 Unit(s) SubCutaneous at bedtime  insulin lispro (ADMELOG) corrective regimen sliding scale   SubCutaneous three times a day before meals  insulin lispro Injectable (ADMELOG) 6 Unit(s) SubCutaneous three times a day before meals  isosorbide   mononitrate ER Tablet (IMDUR) 30 milliGRAM(s) Oral daily  lactated ringers. 1000 milliLiter(s) (100 mL/Hr) IV Continuous <Continuous>  melatonin 6 milliGRAM(s) Oral at bedtime  NIFEdipine XL 30 milliGRAM(s) Oral two times a day  pancrelipase  (CREON 24,000 Lipase Units) 1 Capsule(s) Oral three times a day with meals  polyethylene glycol 3350 17 Gram(s) Oral at bedtime  senna 2 Tablet(s) Oral at bedtime    MEDICATIONS  (PRN):  guaiFENesin Oral Liquid (Sugar-Free) 100 milliGRAM(s) Oral every 6 hours PRN Cough  ondansetron    Tablet 4 milliGRAM(s) Oral daily PRN Nausea and/or Vomiting      -OBJECTIVE-    CAPILLARY BLOOD GLUCOSE      POCT Blood Glucose.: 195 mg/dL (2021 21:27)  POCT Blood Glucose.: 133 mg/dL (2021 16:43)  POCT Blood Glucose.: 152 mg/dL (2021 11:49)  POCT Blood Glucose.: 210 mg/dL (2021 07:06)    I&O's Summary      VITAL SIGNS  Vital Signs Last 24 Hrs  T(C): 36.7 (2021 05:58), Max: 37.2 (2021 11:25)  T(F): 98 (2021 05:58), Max: 99 (2021 11:25)  HR: 76 (2021 05:58) (76 - 109)  BP: 123/60 (2021 05:58) (119/52 - 142/48)  BP(mean): --  RR: 18 (2021 05:58) (16 - 20)  SpO2: 96% (2021 05:58) (93% - 96%)    PHYSICAL EXAM:  GENERAL/CONSTITUTIONAL: NAD, Awake, AOx3 (person, place, time)  HEENT: NCAT  ---EYES: no scleral icterus, EOMI, PERRLA  ---ENMT: MMM,   ---NECK: No palpable masses; no thyromegaly  CARDIO: RRR. Normal S1/S2, no m/r/g.  RESP: Normal respiratory effort; CTAB, no w/r/r  ABD: Soft, NTND; +BS.   : No CVAT.   MSK: No obvious deformity or ROM deficit.  ---EXTREMITIES: No peripheral edema. Peripheral pulses 2+ x4.  SKIN: Warm, dry. No rashes.   NEURO: Moves all four extremities spontaneously  PSYCH: Appropriate mood & affect. No VH/AH. No SI/HI.    LABS:                        11.9   26.18 )-----------( 381      ( 2021 09:37 )             33.8         126<L>  |  90<L>  |  30<H>  ----------------------------<  191<H>  3.9   |  24  |  1.56<H>    Ca    9.2      2021 09:37  Phos  3.8       Mg     2.20         TPro  6.8  /  Alb  3.6  /  TBili  0.4  /  DBili  x   /  AST  14  /  ALT  8   /  AlkPhos  88            Urinalysis Basic - ( 2021 02:20 )    Color: Yellow / Appearance: Turbid / S.037 / pH: x  Gluc: x / Ketone: Trace  / Bili: Negative / Urobili: <2 mg/dL   Blood: x / Protein: 30 mg/dL / Nitrite: Negative   Leuk Esterase: Moderate / RBC: 5 /HPF / WBC 6 /HPF   Sq Epi: x / Non Sq Epi: 7 /HPF / Bacteria: Moderate        MICROBIOLOGY    Culture - Blood (collected 2021 19:40)  Source: .Blood Blood-Peripheral  Preliminary Report (2021 20:01):    No growth to date.      IMAGING  No interval imaging      COORDINATION OF CARE:  ---PCP:  ---Consultants:   Sammy Salcedo M.D.  [facility] PGY-1    PROGRESS NOTE:     Patient is a 89y old  Female who presents with a chief complaint of Chest Pain (2021 06:39)      -OVERNIGHT EVENTS-  - Again agitated overnight, received 3.5mg Haldol (1mg then 2.5mg, last 400)    -SUBJECTIVE-  [place]jiboutian  #[id number] (Tami)  Ms. Estrada is AOx1 today. She thinks she's laying down in a car (but appropriately looks around the room to assess her surroundings. She says that she has some abdominal pain this morning and is worried about her heart. She does not localize her abdominal pain; she hasn't had any vomiting; she still has not had a bowel movement.     MEDICATIONS  (STANDING):  aspirin enteric coated 81 milliGRAM(s) Oral daily  azithromycin  IVPB 500 milliGRAM(s) IV Intermittent every 24 hours  azithromycin  IVPB      cefTRIAXone   IVPB 1000 milliGRAM(s) IV Intermittent every 24 hours  cefTRIAXone   IVPB      dextrose 40% Gel 15 Gram(s) Oral once  dextrose 5%. 1000 milliLiter(s) (50 mL/Hr) IV Continuous <Continuous>  dextrose 5%. 1000 milliLiter(s) (100 mL/Hr) IV Continuous <Continuous>  dextrose 50% Injectable 25 Gram(s) IV Push once  dextrose 50% Injectable 12.5 Gram(s) IV Push once  dextrose 50% Injectable 25 Gram(s) IV Push once  dorzolamide 2% Ophthalmic Solution 2 Drop(s) Both EYES every 8 hours  enoxaparin Injectable 40 milliGRAM(s) SubCutaneous daily  famotidine    Tablet 20 milliGRAM(s) Oral daily  folic acid 1 milliGRAM(s) Oral daily  glucagon  Injectable 1 milliGRAM(s) IntraMuscular once  insulin glargine Injectable (LANTUS) 14 Unit(s) SubCutaneous at bedtime  insulin lispro (ADMELOG) corrective regimen sliding scale   SubCutaneous three times a day before meals  insulin lispro Injectable (ADMELOG) 6 Unit(s) SubCutaneous three times a day before meals  isosorbide   mononitrate ER Tablet (IMDUR) 30 milliGRAM(s) Oral daily  lactated ringers. 1000 milliLiter(s) (100 mL/Hr) IV Continuous <Continuous>  melatonin 6 milliGRAM(s) Oral at bedtime  NIFEdipine XL 30 milliGRAM(s) Oral two times a day  pancrelipase  (CREON 24,000 Lipase Units) 1 Capsule(s) Oral three times a day with meals  polyethylene glycol 3350 17 Gram(s) Oral at bedtime  senna 2 Tablet(s) Oral at bedtime    MEDICATIONS  (PRN):  guaiFENesin Oral Liquid (Sugar-Free) 100 milliGRAM(s) Oral every 6 hours PRN Cough  ondansetron    Tablet 4 milliGRAM(s) Oral daily PRN Nausea and/or Vomiting      -OBJECTIVE-    CAPILLARY BLOOD GLUCOSE      POCT Blood Glucose.: 195 mg/dL (2021 21:27)  POCT Blood Glucose.: 133 mg/dL (2021 16:43)  POCT Blood Glucose.: 152 mg/dL (2021 11:49)  POCT Blood Glucose.: 210 mg/dL (2021 07:06)    I&O's Summary      VITAL SIGNS  Vital Signs Last 24 Hrs  T(C): 36.7 (2021 05:58), Max: 37.2 (2021 11:25)  T(F): 98 (2021 05:58), Max: 99 (2021 11:25)  HR: 76 (2021 05:58) (76 - 109)  BP: 123/60 (2021 05:58) (119/52 - 142/48)  BP(mean): --  RR: 18 (2021 05:58) (16 - 20)  SpO2: 96% (2021 05:58) (93% - 96%)    PHYSICAL EXAM:  GENERAL/CONSTITUTIONAL: Awake, AOx1 (person, place, time)  HEENT: NCAT  ---EYES: no scleral icterus, EOMI  CARDIO: regular sinus tachycardia 109 on monitor  RESP: speaking in short sentences with tachypnea; intermittent cough; mild crackles in b/l bases  ABD: Soft, some mild distension compared to previous without guarding or grimace on palpation  MSK: No obvious deformity or ROM deficit.  ---EXTREMITIES: trace peripheral edema with 2+ pulses b/l in LE  SKIN: Warm, dry. No rashes.   NEURO: Moves all four extremities spontaneously  PSYCH: tired-appearing, following commands appropriately    LABS:                        11.9   26.18 )-----------( 381      ( 2021 09:37 )             33.8         126<L>  |  90<L>  |  30<H>  ----------------------------<  191<H>  3.9   |  24  |  1.56<H>    Ca    9.2      2021 09:37  Phos  3.8       Mg     2.20         TPro  6.8  /  Alb  3.6  /  TBili  0.4  /  DBili  x   /  AST  14  /  ALT  8   /  AlkPhos  88        Urinalysis Basic - ( 2021 02:20 )    Color: Yellow / Appearance: Turbid / S.037 / pH: x  Gluc: x / Ketone: Trace  / Bili: Negative / Urobili: <2 mg/dL   Blood: x / Protein: 30 mg/dL / Nitrite: Negative   Leuk Esterase: Moderate / RBC: 5 /HPF / WBC 6 /HPF   Sq Epi: x / Non Sq Epi: 7 /HPF / Bacteria: Moderate      MICROBIOLOGY    Culture - Blood (collected 2021 19:40)  Source: .Blood Blood-Peripheral  Preliminary Report (2021 20:01):    No growth to date.      IMAGING  No interval imaging      COORDINATION OF CARE:  ---PCP: Jaylin Ken  ---Consultants: Cardiology, Endo

## 2021-11-26 NOTE — PROGRESS NOTE ADULT - PROBLEM SELECTOR PLAN 6
Hyponatremia to 122 on presentation --> 126 (corrects to 127). SOsm 270 c/w hypoosmolar hyponatremia. Given history of multiple diuretics and reports of diuretic overuse as OP, likely hypovolemic hyponatremia from renal losses in this context (especially given inappropriately sodium rich urine - Kalpana > 100). UOsm in this context also inappropriately concentrated > SOsm, which could indicated inappropriate RASS/ADH activation ico hypovolemia vs SIADH/poor intake. Less likely SIADH with SUric acid >4.  Kalpana 103 on admission (inappropriately elevated, likely 2/2 diuretics).  Dx  - Repeat UOsm, Kalpana now that off diuretics    Rx  - S/P 0.5L NS in ED  - PO intake encouraged, may need further volume repletion carefully (as above) Hyponatremia to 122 on presentation --> 130. SOsm 270 c/w hypoosmolar hyponatremia. Given history of multiple diuretics and reports of diuretic overuse as OP, likely hypovolemic hyponatremia from renal losses in this context (especially given inappropriately sodium rich urine - Kalpana > 100). UOsm in this context also inappropriately concentrated > SOsm, which could indicated inappropriate RASS/ADH activation ico hypovolemia vs SIADH/poor intake. Less likely SIADH with SUric acid >4.  Kalpana 103 on admission (inappropriately elevated, likely 2/2 diuretics).    Rx  - S/P 0.5L NS in ED & mild fluids on the floor now  - PO intake encouraged, may need further volume repletion carefully (as above)

## 2021-11-26 NOTE — PHYSICAL THERAPY INITIAL EVALUATION ADULT - GENERAL OBSERVATIONS, REHAB EVAL
Consult received, chart reviewed. Patient received in bed, no apparent distress, +NC, +tele, PCA present.

## 2021-11-26 NOTE — PROGRESS NOTE ADULT - ATTENDING COMMENTS
Seen by me this afternoon, resting comfortably in bed, aide at bedside, +cough  Agitated overnight, will start seroquel qhs for hospital induced delirium, needs frequent reorientation and familiar surroundings  Abdomen soft on exam but has not had a BM since admission, will optimize bowel regimen  C/w current Abx for possible CAP, total of 5 days  Adding IVF's given elevated lactate level, TAYLOR is improving, likely underlying CKD stage 3  Hyponatremia is improving  Apprec Endocrine recs  To speak with daughter once she is in the [place]  Rest as above Seen by me this afternoon, resting comfortably in bed, aide at bedside, +cough  Agitated overnight, will start seroquel qhs for hospital induced delirium, needs frequent reorientation and familiar surroundings  Abdomen soft on exam but has not had a BM since admission, will optimize bowel regimen  C/w current Abx for possible CAP, total of 5 days, leukocytosis is improving  Adding IVF's given elevated lactate level, TAYLOR is improving, likely underlying CKD stage 3  Hyponatremia is improving  Apprec Endocrine recs  Swallow eval recs appreciated  To speak with daughter once she is in [place]  PT eval --> To be determined pending functional mobility assessment and patient's ability to participate in PT  Rest as above Seen by me this afternoon, resting comfortably in bed, aide at bedside, +cough  Agitated overnight, will start seroquel qhs for hospital induced delirium-metabolic encephalopathy-, needs frequent reorientation and familiar surroundings  Abdomen soft on exam but has not had a BM since admission, will optimize bowel regimen  C/w current Abx for possible CAP, total of 5 days, leukocytosis is improving  Adding IVF's given elevated lactate level, TAYLOR is improving, likely underlying CKD stage 3  Hyponatremia is improving  Apprec Endocrine recs  Swallow eval recs appreciated  To speak with daughter once she is in [place]  PT eval --> To be determined pending functional mobility assessment and patient's ability to participate in PT  Rest as above Seen by me this afternoon, resting comfortably in bed, aide at bedside, +cough  Agitated overnight, will start seroquel qhs for hospital induced delirium-metabolic encephalopathy-, needs frequent reorientation and familiar surroundings  Abdomen soft on exam but has not had a BM since admission, will optimize bowel regimen  C/w current Abx for possible CAP, total of 5 days, leukocytosis is improving  Adding IVF's given elevated lactate level, TAYLOR is improving, likely underlying CKD stage 3  Hyponatremia is improving as well  Apprec Endocrine recs  Swallow eval recs appreciated  To speak with daughter once she is in the [place]  PT eval --> To be determined pending functional mobility assessment and patient's ability to participate in PT  Rest as above

## 2021-11-26 NOTE — PROGRESS NOTE ADULT - PROBLEM SELECTOR PLAN 9
SBP 140s in ED, as high as >170 p/t assessment by admitting team. Ultimately , goal would likely be SBP < 150 in an ideal world, but given she may be pain, would not aggressively treat right now. OP regimen is quite extensive, but wouldn't re-start it all right away. SBP fluctuant but within goal.  - CONTINUE Home Nifedipine XL 30mg PO BID  - CONTINUE Home Isosorbide mononitrate ER (IMDUR) 30mg PO QD  - HOLDing Home Clonidine 0.1mg PO BID (unclear problem)  - HOLDing Home Enalapril-HCTZ 10mg-25mg PO QD (given hyponatremia)  - HOLDing Home Hydralazine 50mg PO TID SBP 140s in ED, as high as >170 p/t assessment by admitting team. Ultimately , goal would likely be SBP < 150 in an ideal world, but given she may be pain, would not aggressively treat right now. OP regimen is quite extensive, but wouldn't re-start it all right away. SBP lower than admission stably 110s-120s.  - CONTINUE Home Nifedipine XL 30mg PO BID  - CONTINUE Home Isosorbide mononitrate ER (IMDUR) 30mg PO QD  - HOLDing Home Clonidine 0.1mg PO BID (unclear indication)  - HOLDing Home Enalapril-HCTZ 10mg-25mg PO QD (given hyponatremia)  - HOLDing Home Hydralazine 50mg PO TID

## 2021-11-26 NOTE — PROGRESS NOTE ADULT - PROBLEM SELECTOR PLAN 7
A1c 8.2%. Endo records in [facility] indicate difficult to control DM. Uses Tresiba 22U QHS, NISS at home.   Dx  - FSBS QID  - Endo C/S, Appreciate Recs  Rx:  - C/W Glargine (Lantus) QHS (given poor PO intake at this time, unclear weight)  - CATE

## 2021-11-26 NOTE — PHYSICAL THERAPY INITIAL EVALUATION ADULT - ADDITIONAL COMMENTS
Pt. unable to provide information on social history or previous level of function at this time secondary to cognitive status. Per documentation, pt. lives in an apartment alone. Pt. owns a wheelchair and rolling walker. Pt. has HHA 24/7.     Pt. was left in bed post PT Evaluation, no apparent distress, all lines intact, PCA present.

## 2021-11-26 NOTE — PROGRESS NOTE ADULT - SUBJECTIVE AND OBJECTIVE BOX
HPI:  Ms. Estrada is a [place]utanese-speaking 89F with T2DM (A1c 8.2%), HTN, Dementia (by collateral report) who presents complaining of chest pressure, abdominal pain, and nausea for 1 week - 1 month intermittently. Endocrine consulted for T2DM management.    Interval history:  glucoses above goal  did not get standing lispro with lunch yesterday  no hypos    MEDICATIONS  (STANDING):  aspirin enteric coated 81 milliGRAM(s) Oral daily  azithromycin  IVPB 500 milliGRAM(s) IV Intermittent every 24 hours  azithromycin  IVPB      cefTRIAXone   IVPB 1000 milliGRAM(s) IV Intermittent every 24 hours  cefTRIAXone   IVPB      dextrose 40% Gel 15 Gram(s) Oral once  dextrose 5%. 1000 milliLiter(s) (50 mL/Hr) IV Continuous <Continuous>  dextrose 5%. 1000 milliLiter(s) (100 mL/Hr) IV Continuous <Continuous>  dextrose 50% Injectable 25 Gram(s) IV Push once  dextrose 50% Injectable 12.5 Gram(s) IV Push once  dextrose 50% Injectable 25 Gram(s) IV Push once  dorzolamide 2% Ophthalmic Solution 2 Drop(s) Both EYES every 8 hours  enoxaparin Injectable 40 milliGRAM(s) SubCutaneous daily  famotidine    Tablet 20 milliGRAM(s) Oral daily  folic acid 1 milliGRAM(s) Oral daily  glucagon  Injectable 1 milliGRAM(s) IntraMuscular once  insulin glargine Injectable (LANTUS) 14 Unit(s) SubCutaneous at bedtime  insulin lispro (ADMELOG) corrective regimen sliding scale   SubCutaneous three times a day before meals  insulin lispro Injectable (ADMELOG) 6 Unit(s) SubCutaneous three times a day before meals  isosorbide   mononitrate ER Tablet (IMDUR) 30 milliGRAM(s) Oral daily  melatonin 6 milliGRAM(s) Oral at bedtime  NIFEdipine XL 30 milliGRAM(s) Oral two times a day  pancrelipase  (CREON 24,000 Lipase Units) 1 Capsule(s) Oral three times a day with meals  polyethylene glycol 3350 17 Gram(s) Oral at bedtime  senna 2 Tablet(s) Oral at bedtime    MEDICATIONS  (PRN):  guaiFENesin Oral Liquid (Sugar-Free) 100 milliGRAM(s) Oral every 6 hours PRN Cough  ondansetron    Tablet 4 milliGRAM(s) Oral daily PRN Nausea and/or Vomiting      Allergies    No Known Allergies    Intolerances        Review of Systems:  Constitutional: No fever, good appetite/po intake  Eyes: No blurry vision, diplopia  Neuro: No tremors  HEENT: No pain  Cardiovascular: No chest pain, palpitations  Respiratory: No SOB, no cough  GI: No nausea, vomiting,   : No dysuria, hematuria  Skin: no rash  Psych: no depression  Endocrine: no polyuria, polydipsia  Hem/lymph: no swelling  Osteoporosis: no fractures    ALL OTHER SYSTEMS REVIEWED AND NEGATIVE    PHYSICAL EXAM:  VITALS: T(C): 36.7 (11-26-21 @ 05:58)  T(F): 98 (11-26-21 @ 05:58), Max: 99 (11-25-21 @ 11:25)  HR: 76 (11-26-21 @ 05:58) (76 - 109)  BP: 123/60 (11-26-21 @ 05:58) (119/52 - 142/48)  RR:  (16 - 20)  SpO2:  (93% - 96%)  Wt(kg): --  GENERAL: NAD, well-groomed, well-developed  EYES: No proptosis, no lid lag, anicteric  HEENT:  Atraumatic, normocephalic, moist mucous membranes  RESPIRATORY: nonlabored respirations, no wheezing  PSYCH: Alert and oriented x 3, normal affect, normal mood    POCT Blood Glucose.: 210 mg/dL (11-26-21 @ 07:26)  POCT Blood Glucose.: 195 mg/dL (11-25-21 @ 21:27)  POCT Blood Glucose.: 133 mg/dL (11-25-21 @ 16:43)  POCT Blood Glucose.: 152 mg/dL (11-25-21 @ 11:49)  POCT Blood Glucose.: 210 mg/dL (11-25-21 @ 07:06)  POCT Blood Glucose.: 230 mg/dL (11-25-21 @ 03:02)  POCT Blood Glucose.: 170 mg/dL (11-24-21 @ 21:43)  POCT Blood Glucose.: 178 mg/dL (11-24-21 @ 16:27)  POCT Blood Glucose.: 243 mg/dL (11-24-21 @ 11:30)  POCT Blood Glucose.: 266 mg/dL (11-24-21 @ 09:39)  POCT Blood Glucose.: 234 mg/dL (11-23-21 @ 22:51)  POCT Blood Glucose.: 208 mg/dL (11-23-21 @ 20:05)  POCT Blood Glucose.: 250 mg/dL (11-23-21 @ 13:25)                            10.4   20.37 )-----------( 360      ( 26 Nov 2021 07:13 )             29.6       11-26    130<L>  |  93<L>  |  31<H>  ----------------------------<  194<H>  4.1   |  24  |  1.24    EGFR if : 42<L>  EGFR if non : 36<L>    Ca    8.8      11-26  Mg     2.30     11-26  Phos  4.1     11-26    TPro  6.1  /  Alb  3.2<L>  /  TBili  0.4  /  DBili  x   /  AST  14  /  ALT  8   /  AlkPhos  87  11-26      Thyroid Function Tests:  11-23 @ 04:48 TSH 2.49 FreeT4 -- T3 -- Anti TPO -- Anti Thyroglobulin Ab -- TSI --      11-24 Chol 159 Direct LDL -- LDL calculated 91 HDL 50<L> Trig 91    Radiology:            HPI:  Ms. Estrada is a [place]ian-speaking 89F with T2DM (A1c 8.2%), HTN, Dementia (by collateral report) who presents complaining of chest pressure, abdominal pain, and nausea for 1 week - 1 month intermittently. Endocrine consulted for T2DM management.    Interval history:  glucoses above goal; no hypos  did not get standing lispro with lunch yesterday  no hypos.  S/S recommends pureed diet. Pt eating full meals provided    MEDICATIONS  (STANDING):  aspirin enteric coated 81 milliGRAM(s) Oral daily  azithromycin  IVPB 500 milliGRAM(s) IV Intermittent every 24 hours  azithromycin  IVPB      cefTRIAXone   IVPB 1000 milliGRAM(s) IV Intermittent every 24 hours  cefTRIAXone   IVPB      dextrose 40% Gel 15 Gram(s) Oral once  dextrose 5%. 1000 milliLiter(s) (50 mL/Hr) IV Continuous <Continuous>  dextrose 5%. 1000 milliLiter(s) (100 mL/Hr) IV Continuous <Continuous>  dextrose 50% Injectable 25 Gram(s) IV Push once  dextrose 50% Injectable 12.5 Gram(s) IV Push once  dextrose 50% Injectable 25 Gram(s) IV Push once  dorzolamide 2% Ophthalmic Solution 2 Drop(s) Both EYES every 8 hours  enoxaparin Injectable 40 milliGRAM(s) SubCutaneous daily  famotidine    Tablet 20 milliGRAM(s) Oral daily  folic acid 1 milliGRAM(s) Oral daily  glucagon  Injectable 1 milliGRAM(s) IntraMuscular once  insulin glargine Injectable (LANTUS) 14 Unit(s) SubCutaneous at bedtime  insulin lispro (ADMELOG) corrective regimen sliding scale   SubCutaneous three times a day before meals  insulin lispro Injectable (ADMELOG) 6 Unit(s) SubCutaneous three times a day before meals  isosorbide   mononitrate ER Tablet (IMDUR) 30 milliGRAM(s) Oral daily  melatonin 6 milliGRAM(s) Oral at bedtime  NIFEdipine XL 30 milliGRAM(s) Oral two times a day  pancrelipase  (CREON 24,000 Lipase Units) 1 Capsule(s) Oral three times a day with meals  polyethylene glycol 3350 17 Gram(s) Oral at bedtime  senna 2 Tablet(s) Oral at bedtime    MEDICATIONS  (PRN):  guaiFENesin Oral Liquid (Sugar-Free) 100 milliGRAM(s) Oral every 6 hours PRN Cough  ondansetron    Tablet 4 milliGRAM(s) Oral daily PRN Nausea and/or Vomiting      Allergies    No Known Allergies    Intolerances        Review of Systems:  Constitutional: No fever, good appetite/po intake  Eyes: No blurry vision, diplopia  Neuro: No tremors  HEENT: No pain  Cardiovascular: No chest pain, palpitations  Respiratory: No SOB, no cough  GI: No nausea, vomiting,   : No dysuria, hematuria  Skin: no rash  Psych: no depression  Endocrine: no polyuria, polydipsia  Hem/lymph: no swelling  Osteoporosis: no fractures    ALL OTHER SYSTEMS REVIEWED AND NEGATIVE    PHYSICAL EXAM:  VITALS: T(C): 36.7 (11-26-21 @ 05:58)  T(F): 98 (11-26-21 @ 05:58), Max: 99 (11-25-21 @ 11:25)  HR: 76 (11-26-21 @ 05:58) (76 - 109)  BP: 123/60 (11-26-21 @ 05:58) (119/52 - 142/48)  RR:  (16 - 20)  SpO2:  (93% - 96%)  Wt(kg): --  GENERAL: NAD, well-groomed, well-developed  EYES: No proptosis, no lid lag, anicteric  HEENT:  Atraumatic, normocephalic, moist mucous membranes  RESPIRATORY: nonlabored respirations, no wheezing  PSYCH: Alert and oriented x 3, normal affect, normal mood    POCT Blood Glucose.: 210 mg/dL (11-26-21 @ 07:26)  POCT Blood Glucose.: 195 mg/dL (11-25-21 @ 21:27)  POCT Blood Glucose.: 133 mg/dL (11-25-21 @ 16:43)  POCT Blood Glucose.: 152 mg/dL (11-25-21 @ 11:49)  POCT Blood Glucose.: 210 mg/dL (11-25-21 @ 07:06)  POCT Blood Glucose.: 230 mg/dL (11-25-21 @ 03:02)  POCT Blood Glucose.: 170 mg/dL (11-24-21 @ 21:43)  POCT Blood Glucose.: 178 mg/dL (11-24-21 @ 16:27)  POCT Blood Glucose.: 243 mg/dL (11-24-21 @ 11:30)  POCT Blood Glucose.: 266 mg/dL (11-24-21 @ 09:39)  POCT Blood Glucose.: 234 mg/dL (11-23-21 @ 22:51)  POCT Blood Glucose.: 208 mg/dL (11-23-21 @ 20:05)  POCT Blood Glucose.: 250 mg/dL (11-23-21 @ 13:25)                            10.4   20.37 )-----------( 360      ( 26 Nov 2021 07:13 )             29.6       11-26    130<L>  |  93<L>  |  31<H>  ----------------------------<  194<H>  4.1   |  24  |  1.24    EGFR if : 42<L>  EGFR if non : 36<L>    Ca    8.8      11-26  Mg     2.30     11-26  Phos  4.1     11-26    TPro  6.1  /  Alb  3.2<L>  /  TBili  0.4  /  DBili  x   /  AST  14  /  ALT  8   /  AlkPhos  87  11-26      Thyroid Function Tests:  11-23 @ 04:48 TSH 2.49 FreeT4 -- T3 -- Anti TPO -- Anti Thyroglobulin Ab -- TSI --      11-24 Chol 159 Direct LDL -- LDL calculated 91 HDL 50<L> Trig 91    Radiology:

## 2021-11-26 NOTE — PROGRESS NOTE ADULT - PROBLEM SELECTOR PLAN 1
Leukocytosis worsened to 26 11/25. (11/23-24 NLR 10 from 5), continues to uptrend. Appears to have waxing-waning AMS, which may indicate TME as component of this.  Diagnostics  - MRSA Swab  - UCx (-), obtain Yarelis Forman Ag  - BCx x1 11/24 (NGTD)  - CXR: RLL ?atelectasis    Therapeutics  [] C/W CTX 1g IV QD x5d (for ?CAP) (11/24-) (low threshold to broaden if no clinical/leukocytic improvement)  [] C/W Azithromycin 500mg IV QD x3d (for ?CAP) (11/24-)  - CTM (trend NLR for better understanding of this pt's physiology) Leukocytosis worsened to 26 --> 20 11/26. (NLR 5 --> 10 --> 16 --> 8), continues to uptrend. Appears to have waxing-waning AMS, which may indicate TME as component of this. Improving, but continues to have intermittent agitation / delierium. Sepsis considered given now relatively lower BPs, but without tachyardia (though pt on Carvedilol at home now several days out), no fevers (though pt elderly). Given IVF gently given unknown cardiac history, ABx already intiated, monitoring closely.  Diagnostics  - MRSA Swab  - UCx (-), obtain ULegionella, UStrep Ag  - BCx x1 11/24 (NGTD)  - CXR: RLL ?atelectasis vs infxn    Therapeutics  [] C/W CTX 1g IV QD x5d (for ?CAP) (11/24-) (low threshold to broaden if no clinical/leukocytic improvement)  [] C/W Azithromycin 500mg IV QD x3d (for ?CAP) (11/24-)  - CTM (trend NLR for better understanding of this pt's physiology) Leukocytosis worsened to 26 --> 20 11/26. (NLR 5 --> 10 --> 16 --> 8), improved. Appears to have waxing-waning AMS, which may indicate delirious vs TME as component of this. Severe Sepsis considered given now relatively lower BPs, but without persistent tachyardia (though pt on Carvedilol at home now several days out), no fevers (though pt elderly) but with TAYLOR. Given IVF gently given unknown cardiac history, ABx already initiated, monitoring closely.  Diagnostics  - MRSA Swab  - UCx (-), obtain ULegionella, UStrep Ag  - BCx x1 11/24 (NGTD)  - CXR: RLL ?atelectasis vs infxn    Therapeutics  [] C/W CTX 1g IV QD x5-7d (for ?CAP) (11/24-) (low threshold to broaden if no clinical/leukocytic improvement)  [] C/W Azithromycin 500mg IV QD x3-5d (for ?CAP) (11/24-)  - CTM (trend NLR for better understanding of this pt's physiology)

## 2021-11-26 NOTE — PHYSICAL THERAPY INITIAL EVALUATION ADULT - CRITERIA FOR SKILLED THERAPEUTIC INTERVENTIONS
rehab potential/therapy frequency/predicted duration of therapy intervention/anticipated discharge recommendation

## 2021-11-26 NOTE — PROGRESS NOTE ADULT - ASSESSMENT
Ms. Estrada is a 89 year old [place]-speaking woman with HTN, T2DM (A1c 8.2%), dementia (by report, NOS) who presents with acute on subacute chest pain with associated HASTINGS and abdominal pain whose differential is broad but includes ACS (incl. unstable angina vs NSTEMI), intra-abdominal pathologies (symptomatic worsening of hiatal hernia?). She may now also have altered mental status ico worsening leukocytosis despite continued lack of obvious etiology, so will be initiated on empiric antibiotics pending further evaluation.

## 2021-11-26 NOTE — PROGRESS NOTE ADULT - PROBLEM SELECTOR PLAN 3
Pt presents with CP of variable timeline (based on pt vs collateral history anywhere from 1d - 1 month). Trop rising in ED (with change > 6), ECG with LBBB (unclear if new or old w/o records; w/o meeting Scarbossa criteria) but without dynamic changes or continued uptrending troponin. Possibly some supply/demand mismatch (i.e. NSTEMI Type 2).  Dx:  - TTE ordered, still pending  - Cardiology C/S, Appreciate Recs (follow-up when TTE returned)    Rx:  - CONTINUE Home ASA 81mg PO QD (s/p addl 162 in ED)  - DIScontinue Home Clopidogrel 75mg PO QD (unclear indication)  - HOLDing Home ACEi/HCTZ d/t Hyponatremia as below  - HOLDing Home Furosemide 20mg PO QD (unclear EDW, ZEUS) Pt presents with CP of variable timeline (based on pt vs collateral history anywhere from 1d - 1 month). Trop rising in ED (with change > 6), ECG with LBBB (unclear if new or old w/o records; w/o meeting Scarbossa criteria) but without dynamic changes or continued uptrending troponin. Possibly some supply/demand mismatch (i.e. NSTEMI Type 2) without acute vasoocclusive event, though also considering TAYLOR that had been seen.  Dx:  - Cardiology C/S, Appreciate Recs - less likely ACS, no need for echo as would not     Rx:  - CONTINUE Home ASA 81mg PO QD (s/p addl 162 in ED)  - DIScontinue Home Clopidogrel 75mg PO QD (unclear indication)  - HOLDing Home ACEi/HCTZ d/t Hyponatremia as below  - HOLDing Home Furosemide 20mg PO QD (unclear EDW, ZEUS)

## 2021-11-26 NOTE — PROGRESS NOTE ADULT - PROBLEM SELECTOR PLAN 8
Mild, normocytic anemia. Reportedly rx'd both famotidine & omeprazole. PPI increases risk for many things, particularly in elderly patients, and specifically can be a/w RONALD. Significantly iron deficient, but would not replete given rising leukocytosis, c/f infection.   - Fe 15, TIBC 256, Ferritin 123, TSAT 6%  - Estimated Iron Deficit:   - CONTINUE Home Folate 1mg PO QD Mild, normocytic anemia. Reportedly rx'd both famotidine & omeprazole. PPI increases risk for many things, particularly in elderly patients, and specifically can be a/w RONALD. Significantly iron deficient, but would not replete given rising leukocytosis, c/f infection.   - Fe 15, TIBC 256, Ferritin 123, TSAT 6%  - Estimated Iron Deficit: 508mg  - CONTINUE Home Folate 1mg PO QD

## 2021-11-26 NOTE — PROGRESS NOTE ADULT - ASSESSMENT
89yo F with PMHx HTN, HLD, DM, who presents with chest discomfort and shortness of breath from home. Per collateral reports patient had been experiencing abdominal pain X 10 days, N/V x 3 days. Cards consulted for increasing troponin and LBBB on EKG. Patient seen and examined at the bedside. Currently in NAD and does not appear with active chest pain or SOB at this time.     Negative trop especially in setting of TAYLOR is highly suggestive that this is not a cardiac event. Per documentation, patient continues to refuse medications and remains agitated. Currently on empiric abx, team to evaluate source of infection.  -continue ASA 81  -No further cardiology recommendations at this time.    89yo F with PMHx HTN, HLD, DM, who presents with chest discomfort and shortness of breath from home. Per collateral reports patient had been experiencing abdominal pain X 10 days, N/V x 3 days. Cards consulted for increasing troponin and LBBB on EKG. Patient seen and examined at the bedside. Currently in NAD and does not appear with active chest pain or SOB at this time.     Negative trop especially in setting of TAYLOR is highly suggestive that this is not a cardiac event. Per documentation, patient continues to refuse medications and remains agitated. Currently on empiric abx, team to evaluate source of infection.  -continue ASA 81  -does not require TTE at this time as it will not   -No further cardiology recommendations at this time.

## 2021-11-26 NOTE — PROGRESS NOTE ADULT - PROBLEM SELECTOR PLAN 5
Creatinine 0.84 --> 1.56 today. PCP reports renal dysfunction as OP but had normal creatinine on admission. Pre-renal most likely, but given hematuria, abd pain, will check lytes again and reassess FeNa & FeUrea to further characterize this problem. if persistent, may need ultrasound or repeat CT given difficult to obtain hx.   Dx:  [ ] ULytes, UUrea, UCr  Rx:  - START LR 100cc/hr x10h  - Low threshold for further fluid resuscitation Creatinine 0.84 --> 1.56 --> 1.2. PCP reports renal dysfunction as OP but had normal creatinine on admission. Pre-renal most likely, but given hematuria, abd pain, will check lytes again and reassess FeNa & FeUrea to further characterize this problem. if persistent, may need ultrasound or repeat CT given difficult to obtain hx.   Dx:  - Urine lytes not collected, but had good response to fluids. Likely pre-renal in this context, will redose gentle fluid resuscitation    Rx:  - C/W LR 75cc/hr x10h

## 2021-11-26 NOTE — SWALLOW BEDSIDE ASSESSMENT ADULT - COMMENTS
As per Internal Medicine note 11/26/21, pt is a "89 year old [place]ian-speaking woman with HTN, T2DM (A1c 8.2%), dementia (by report, NOS) who presents with acute on subacute chest pain with associated HASTINGS and abdominal pain whose differential is broad but includes ACS (incl. unstable angina vs NSTEMI), intra-abdominal pathologies (symptomatic worsening of hiatal hernia?). She may now also have altered mental status ico worsening leukocytosis despite continued lack of obvious etiology, so will be initiated on empiric antibiotics pending further evaluation."    WBC is elevated. CXR 11/24/21 revealed "Right basilar opacity probably atelectasis."    Pt seen at bedside, awake/alert, during clinical swallow assessment this AM. [place]ian translation services utilized to provide interpretation ( ID# [id number]). Pt noted with confusion and difficulty identifying location, however pt able to follow simple directions and verbalize responses to questions. Pt is receiving supplemental O2 via nasal cannula. Pt noted with baseline cough.

## 2021-11-27 LAB
ALBUMIN SERPL ELPH-MCNC: 3 G/DL — LOW (ref 3.3–5)
ALP SERPL-CCNC: 84 U/L — SIGNIFICANT CHANGE UP (ref 40–120)
ALT FLD-CCNC: 9 U/L — SIGNIFICANT CHANGE UP (ref 4–33)
ANION GAP SERPL CALC-SCNC: 10 MMOL/L — SIGNIFICANT CHANGE UP (ref 7–14)
AST SERPL-CCNC: 12 U/L — SIGNIFICANT CHANGE UP (ref 4–32)
BASOPHILS # BLD AUTO: 0.03 K/UL — SIGNIFICANT CHANGE UP (ref 0–0.2)
BASOPHILS NFR BLD AUTO: 0.2 % — SIGNIFICANT CHANGE UP (ref 0–2)
BILIRUB SERPL-MCNC: 0.3 MG/DL — SIGNIFICANT CHANGE UP (ref 0.2–1.2)
BUN SERPL-MCNC: 27 MG/DL — HIGH (ref 7–23)
CALCIUM SERPL-MCNC: 8.9 MG/DL — SIGNIFICANT CHANGE UP (ref 8.4–10.5)
CHLORIDE SERPL-SCNC: 95 MMOL/L — LOW (ref 98–107)
CO2 SERPL-SCNC: 26 MMOL/L — SIGNIFICANT CHANGE UP (ref 22–31)
CREAT SERPL-MCNC: 0.93 MG/DL — SIGNIFICANT CHANGE UP (ref 0.5–1.3)
CULTURE RESULTS: SIGNIFICANT CHANGE UP
EOSINOPHIL # BLD AUTO: 0.04 K/UL — SIGNIFICANT CHANGE UP (ref 0–0.5)
EOSINOPHIL NFR BLD AUTO: 0.2 % — SIGNIFICANT CHANGE UP (ref 0–6)
GLUCOSE BLDC GLUCOMTR-MCNC: 111 MG/DL — HIGH (ref 70–99)
GLUCOSE BLDC GLUCOMTR-MCNC: 143 MG/DL — HIGH (ref 70–99)
GLUCOSE BLDC GLUCOMTR-MCNC: 220 MG/DL — HIGH (ref 70–99)
GLUCOSE BLDC GLUCOMTR-MCNC: 220 MG/DL — HIGH (ref 70–99)
GLUCOSE SERPL-MCNC: 101 MG/DL — HIGH (ref 70–99)
HCT VFR BLD CALC: 29.1 % — LOW (ref 34.5–45)
HGB BLD-MCNC: 10.2 G/DL — LOW (ref 11.5–15.5)
IANC: 15.27 K/UL — HIGH (ref 1.5–8.5)
IMM GRANULOCYTES NFR BLD AUTO: 0.9 % — SIGNIFICANT CHANGE UP (ref 0–1.5)
LYMPHOCYTES # BLD AUTO: 13 % — SIGNIFICANT CHANGE UP (ref 13–44)
LYMPHOCYTES # BLD AUTO: 2.51 K/UL — SIGNIFICANT CHANGE UP (ref 1–3.3)
MAGNESIUM SERPL-MCNC: 2.1 MG/DL — SIGNIFICANT CHANGE UP (ref 1.6–2.6)
MCHC RBC-ENTMCNC: 28.7 PG — SIGNIFICANT CHANGE UP (ref 27–34)
MCHC RBC-ENTMCNC: 35.1 GM/DL — SIGNIFICANT CHANGE UP (ref 32–36)
MCV RBC AUTO: 82 FL — SIGNIFICANT CHANGE UP (ref 80–100)
MONOCYTES # BLD AUTO: 1.21 K/UL — HIGH (ref 0–0.9)
MONOCYTES NFR BLD AUTO: 6.3 % — SIGNIFICANT CHANGE UP (ref 2–14)
NEUTROPHILS # BLD AUTO: 15.27 K/UL — HIGH (ref 1.8–7.4)
NEUTROPHILS NFR BLD AUTO: 79.4 % — HIGH (ref 43–77)
NRBC # BLD: 0 /100 WBCS — SIGNIFICANT CHANGE UP
NRBC # FLD: 0 K/UL — SIGNIFICANT CHANGE UP
PHOSPHATE SERPL-MCNC: 3.4 MG/DL — SIGNIFICANT CHANGE UP (ref 2.5–4.5)
PLATELET # BLD AUTO: 378 K/UL — SIGNIFICANT CHANGE UP (ref 150–400)
POTASSIUM SERPL-MCNC: 3.7 MMOL/L — SIGNIFICANT CHANGE UP (ref 3.5–5.3)
POTASSIUM SERPL-SCNC: 3.7 MMOL/L — SIGNIFICANT CHANGE UP (ref 3.5–5.3)
PROT SERPL-MCNC: 6.1 G/DL — SIGNIFICANT CHANGE UP (ref 6–8.3)
RBC # BLD: 3.55 M/UL — LOW (ref 3.8–5.2)
RBC # FLD: 14.7 % — HIGH (ref 10.3–14.5)
SODIUM SERPL-SCNC: 131 MMOL/L — LOW (ref 135–145)
SPECIMEN SOURCE: SIGNIFICANT CHANGE UP
WBC # BLD: 19.24 K/UL — HIGH (ref 3.8–10.5)
WBC # FLD AUTO: 19.24 K/UL — HIGH (ref 3.8–10.5)

## 2021-11-27 PROCEDURE — 99233 SBSQ HOSP IP/OBS HIGH 50: CPT | Mod: GC

## 2021-11-27 RX ORDER — ACETAMINOPHEN 500 MG
650 TABLET ORAL ONCE
Refills: 0 | Status: DISCONTINUED | OUTPATIENT
Start: 2021-11-27 | End: 2021-11-27

## 2021-11-27 RX ORDER — POTASSIUM CHLORIDE 20 MEQ
30 PACKET (EA) ORAL ONCE
Refills: 0 | Status: DISCONTINUED | OUTPATIENT
Start: 2021-11-27 | End: 2021-11-27

## 2021-11-27 RX ADMIN — ISOSORBIDE MONONITRATE 30 MILLIGRAM(S): 60 TABLET, EXTENDED RELEASE ORAL at 14:57

## 2021-11-27 RX ADMIN — CEFTRIAXONE 100 MILLIGRAM(S): 500 INJECTION, POWDER, FOR SOLUTION INTRAMUSCULAR; INTRAVENOUS at 13:41

## 2021-11-27 RX ADMIN — Medication 8 UNIT(S): at 09:18

## 2021-11-27 RX ADMIN — DORZOLAMIDE HYDROCHLORIDE 2 DROP(S): 20 SOLUTION/ DROPS OPHTHALMIC at 09:17

## 2021-11-27 RX ADMIN — QUETIAPINE FUMARATE 25 MILLIGRAM(S): 200 TABLET, FILM COATED ORAL at 22:16

## 2021-11-27 RX ADMIN — Medication 1 CAPSULE(S): at 18:36

## 2021-11-27 RX ADMIN — DORZOLAMIDE HYDROCHLORIDE 2 DROP(S): 20 SOLUTION/ DROPS OPHTHALMIC at 01:56

## 2021-11-27 RX ADMIN — SENNA PLUS 2 TABLET(S): 8.6 TABLET ORAL at 22:16

## 2021-11-27 RX ADMIN — INSULIN GLARGINE 16 UNIT(S): 100 INJECTION, SOLUTION SUBCUTANEOUS at 22:15

## 2021-11-27 RX ADMIN — Medication 30 MILLIGRAM(S): at 18:36

## 2021-11-27 RX ADMIN — Medication 81 MILLIGRAM(S): at 14:58

## 2021-11-27 RX ADMIN — Medication 2: at 09:18

## 2021-11-27 RX ADMIN — DORZOLAMIDE HYDROCHLORIDE 2 DROP(S): 20 SOLUTION/ DROPS OPHTHALMIC at 18:34

## 2021-11-27 RX ADMIN — AZITHROMYCIN 255 MILLIGRAM(S): 500 TABLET, FILM COATED ORAL at 14:56

## 2021-11-27 RX ADMIN — Medication 1 MILLIGRAM(S): at 14:57

## 2021-11-27 RX ADMIN — Medication 6 MILLIGRAM(S): at 22:16

## 2021-11-27 RX ADMIN — POLYETHYLENE GLYCOL 3350 17 GRAM(S): 17 POWDER, FOR SOLUTION ORAL at 22:17

## 2021-11-27 RX ADMIN — ENOXAPARIN SODIUM 40 MILLIGRAM(S): 100 INJECTION SUBCUTANEOUS at 14:58

## 2021-11-27 RX ADMIN — FAMOTIDINE 20 MILLIGRAM(S): 10 INJECTION INTRAVENOUS at 14:57

## 2021-11-27 RX ADMIN — Medication 8 UNIT(S): at 18:35

## 2021-11-27 RX ADMIN — Medication 30 MILLIGRAM(S): at 06:43

## 2021-11-27 NOTE — PROGRESS NOTE ADULT - PROBLEM SELECTOR PLAN 1
Leukocytosis worsened to 26 --> 20 11/26. (NLR 5 --> 10 --> 16 --> 8), improved. Appears to have waxing-waning AMS, which may indicate delirious vs TME as component of this. Severe Sepsis considered given now relatively lower BPs, but without persistent tachyardia (though pt on Carvedilol at home now several days out), no fevers (though pt elderly) but with TAYLOR. Given IVF gently given unknown cardiac history, ABx already initiated, monitoring closely.  Diagnostics  - MRSA Swab  - UCx (-), obtain ULegionella, UStrep Ag  - BCx x1 11/24 (NGTD)  - CXR: RLL ?atelectasis vs infxn    Therapeutics  [] C/W CTX 1g IV QD x5-7d (for ?CAP) (11/24-) (low threshold to broaden if no clinical/leukocytic improvement)  [] C/W Azithromycin 500mg IV QD x3-5d (for ?CAP) (11/24-)  - CTM (trend NLR for better understanding of this pt's physiology)

## 2021-11-27 NOTE — PROGRESS NOTE ADULT - SUBJECTIVE AND OBJECTIVE BOX
Sammy Salcedo M.D.  [facility] PGY-1    PROGRESS NOTE:     Patient is a 89y old  Female who presents with a chief complaint of Chest Pain (2021 09:53)      -OVERNIGHT EVENTS-      -SUBJECTIVE-      MEDICATIONS  (STANDING):  aspirin enteric coated 81 milliGRAM(s) Oral daily  azithromycin  IVPB 500 milliGRAM(s) IV Intermittent every 24 hours  azithromycin  IVPB      cefTRIAXone   IVPB 1000 milliGRAM(s) IV Intermittent every 24 hours  cefTRIAXone   IVPB      dextrose 40% Gel 15 Gram(s) Oral once  dextrose 5% + lactated ringers. 1000 milliLiter(s) (75 mL/Hr) IV Continuous <Continuous>  dextrose 5%. 1000 milliLiter(s) (50 mL/Hr) IV Continuous <Continuous>  dextrose 5%. 1000 milliLiter(s) (100 mL/Hr) IV Continuous <Continuous>  dextrose 50% Injectable 25 Gram(s) IV Push once  dextrose 50% Injectable 12.5 Gram(s) IV Push once  dextrose 50% Injectable 25 Gram(s) IV Push once  dorzolamide 2% Ophthalmic Solution 2 Drop(s) Both EYES every 8 hours  enoxaparin Injectable 40 milliGRAM(s) SubCutaneous daily  famotidine    Tablet 20 milliGRAM(s) Oral daily  folic acid 1 milliGRAM(s) Oral daily  glucagon  Injectable 1 milliGRAM(s) IntraMuscular once  insulin glargine Injectable (LANTUS) 16 Unit(s) SubCutaneous at bedtime  insulin lispro (ADMELOG) corrective regimen sliding scale   SubCutaneous three times a day before meals  insulin lispro Injectable (ADMELOG) 8 Unit(s) SubCutaneous three times a day before meals  isosorbide   mononitrate ER Tablet (IMDUR) 30 milliGRAM(s) Oral daily  melatonin 6 milliGRAM(s) Oral at bedtime  NIFEdipine XL 30 milliGRAM(s) Oral two times a day  pancrelipase  (CREON 24,000 Lipase Units) 1 Capsule(s) Oral three times a day with meals  polyethylene glycol 3350 17 Gram(s) Oral at bedtime  QUEtiapine 25 milliGRAM(s) Oral at bedtime  senna 2 Tablet(s) Oral at bedtime    MEDICATIONS  (PRN):  guaiFENesin Oral Liquid (Sugar-Free) 200 milliGRAM(s) Oral every 6 hours PRN Cough  ondansetron    Tablet 4 milliGRAM(s) Oral daily PRN Nausea and/or Vomiting      -OBJECTIVE-    CAPILLARY BLOOD GLUCOSE      POCT Blood Glucose.: 184 mg/dL (2021 22:36)  POCT Blood Glucose.: 230 mg/dL (2021 16:37)  POCT Blood Glucose.: 287 mg/dL (2021 12:05)  POCT Blood Glucose.: 280 mg/dL (2021 11:59)  POCT Blood Glucose.: 406 mg/dL (2021 11:40)    I&O's Summary      VITAL SIGNS  Vital Signs Last 24 Hrs  T(C): 36.7 (2021 06:41), Max: 36.8 (2021 22:04)  T(F): 98 (2021 06:41), Max: 98.2 (2021 22:04)  HR: 97 (2021 06:41) (91 - 100)  BP: 139/56 (2021 06:41) (127/58 - 145/48)  BP(mean): --  RR: 20 (2021 06:41) (17 - 22)  SpO2: 98% (2021 06:41) (92% - 100%)    PHYSICAL EXAM:  GENERAL/CONSTITUTIONAL: NAD, Awake, AOx3 (person, place, time)  HEENT: NCAT  ---EYES: no scleral icterus, EOMI, PERRLA  ---ENMT: MMM,   ---NECK: No palpable masses; no thyromegaly  CARDIO: RRR. Normal S1/S2, no m/r/g.  RESP: Normal respiratory effort; CTAB, no w/r/r  ABD: Soft, NTND; +BS.   : No CVAT.   MSK: No obvious deformity or ROM deficit.  ---EXTREMITIES: No peripheral edema. Peripheral pulses 2+ x4.  SKIN: Warm, dry. No rashes.   NEURO: Moves all four extremities spontaneously  PSYCH: Appropriate mood & affect. No VH/AH. No SI/HI.    LABS:                        10.4   20.37 )-----------( 360      ( 2021 07:13 )             29.6     11-26    130<L>  |  93<L>  |  31<H>  ----------------------------<  194<H>  4.1   |  24  |  1.24    Ca    8.8      2021 07:13  Phos  4.1       Mg     2.30         TPro  6.1  /  Alb  3.2<L>  /  TBili  0.4  /  DBili  x   /  AST  14  /  ALT  8   /  AlkPhos  87            Urinalysis Basic - ( 2021 02:20 )    Color: Yellow / Appearance: Turbid / S.037 / pH: x  Gluc: x / Ketone: Trace  / Bili: Negative / Urobili: <2 mg/dL   Blood: x / Protein: 30 mg/dL / Nitrite: Negative   Leuk Esterase: Moderate / RBC: 5 /HPF / WBC 6 /HPF   Sq Epi: x / Non Sq Epi: 7 /HPF / Bacteria: Moderate        MICROBIOLOGY    Culture - Nose (collected 2021 17:13)  Source: .Nose  Preliminary Report (2021 20:16):    No Staphylococcus aureus isolated to date    Culture - Blood (collected 2021 19:40)  Source: .Blood Blood-Peripheral  Preliminary Report (2021 20:01):    No growth to date.        IMAGING        COORDINATION OF CARE:  ---PCP:  ---Consultants:   Sammy Salcedo M.D.  [facility] PGY-1    PROGRESS NOTE:     Patient is a 89y old  Female who presents with a chief complaint of Chest Pain (2021 09:53)      -OVERNIGHT EVENTS-  - Initiated on NC for O2 90%    -SUBJECTIVE-  Maltese  #[id number] (Javed)  Ms. Estrada is found sleeping and tachypneic with nasal canula. Her vitals were being taken and her O2 was 100% on 2L NC. This was discontinued. Ms. Estrada is reactive to pain but does not engage with the  or open her eyes. She does not respond to questions.    MEDICATIONS  (STANDING):  aspirin enteric coated 81 milliGRAM(s) Oral daily  azithromycin  IVPB 500 milliGRAM(s) IV Intermittent every 24 hours  azithromycin  IVPB      cefTRIAXone   IVPB 1000 milliGRAM(s) IV Intermittent every 24 hours  cefTRIAXone   IVPB      dextrose 40% Gel 15 Gram(s) Oral once  dextrose 5% + lactated ringers. 1000 milliLiter(s) (75 mL/Hr) IV Continuous <Continuous>  dextrose 5%. 1000 milliLiter(s) (50 mL/Hr) IV Continuous <Continuous>  dextrose 5%. 1000 milliLiter(s) (100 mL/Hr) IV Continuous <Continuous>  dextrose 50% Injectable 25 Gram(s) IV Push once  dextrose 50% Injectable 12.5 Gram(s) IV Push once  dextrose 50% Injectable 25 Gram(s) IV Push once  dorzolamide 2% Ophthalmic Solution 2 Drop(s) Both EYES every 8 hours  enoxaparin Injectable 40 milliGRAM(s) SubCutaneous daily  famotidine    Tablet 20 milliGRAM(s) Oral daily  folic acid 1 milliGRAM(s) Oral daily  glucagon  Injectable 1 milliGRAM(s) IntraMuscular once  insulin glargine Injectable (LANTUS) 16 Unit(s) SubCutaneous at bedtime  insulin lispro (ADMELOG) corrective regimen sliding scale   SubCutaneous three times a day before meals  insulin lispro Injectable (ADMELOG) 8 Unit(s) SubCutaneous three times a day before meals  isosorbide   mononitrate ER Tablet (IMDUR) 30 milliGRAM(s) Oral daily  melatonin 6 milliGRAM(s) Oral at bedtime  NIFEdipine XL 30 milliGRAM(s) Oral two times a day  pancrelipase  (CREON 24,000 Lipase Units) 1 Capsule(s) Oral three times a day with meals  polyethylene glycol 3350 17 Gram(s) Oral at bedtime  QUEtiapine 25 milliGRAM(s) Oral at bedtime  senna 2 Tablet(s) Oral at bedtime    MEDICATIONS  (PRN):  guaiFENesin Oral Liquid (Sugar-Free) 200 milliGRAM(s) Oral every 6 hours PRN Cough  ondansetron    Tablet 4 milliGRAM(s) Oral daily PRN Nausea and/or Vomiting      -OBJECTIVE-    CAPILLARY BLOOD GLUCOSE      POCT Blood Glucose.: 184 mg/dL (2021 22:36)  POCT Blood Glucose.: 230 mg/dL (2021 16:37)  POCT Blood Glucose.: 287 mg/dL (2021 12:05)  POCT Blood Glucose.: 280 mg/dL (2021 11:59)  POCT Blood Glucose.: 406 mg/dL (2021 11:40)    I&O's Summary      VITAL SIGNS  Vital Signs Last 24 Hrs  T(C): 36.7 (2021 06:41), Max: 36.8 (2021 22:04)  T(F): 98 (2021 06:41), Max: 98.2 (2021 22:04)  HR: 97 (2021 06:41) (91 - 100)  BP: 139/56 (2021 06:41) (127/58 - 145/48)  BP(mean): --  RR: 20 (2021 06:41) (17 - 22)  SpO2: 98% (2021 06:41) (92% - 100%)    PHYSICAL EXAM:  GENERAL/CONSTITUTIONAL: NAD, Awake, AOx3 (person, place, time)  HEENT: NCAT  ---EYES: no scleral icterus, EOMI, PERRLA  ---ENMT: MMM  CARDIO: RRR. Difficult to auscultate d/t body position, poor cooperation   RESP: tachypneic, mildly increased WOB; poor inspiratory effort d/t not following commands from   ABD: Soft, NTND; +BS.   : No CVAT.   MSK: No obvious deformity or ROM deficit.  ---EXTREMITIES: No peripheral edema. Peripheral pulses 2+ x4.  SKIN: Warm, dry. No rashes.   NEURO: Moves all four extremities spontaneously  PSYCH: Appropriate mood & affect.    LABS:                        10.4   20.37 )-----------( 360      ( 2021 07:13 )             29.6         130<L>  |  93<L>  |  31<H>  ----------------------------<  194<H>  4.1   |  24  |  1.24    Ca    8.8      2021 07:13  Phos  4.1       Mg     2.30         TPro  6.1  /  Alb  3.2<L>  /  TBili  0.4  /  DBili  x   /  AST  14  /  ALT  8   /  AlkPhos  87            Urinalysis Basic - ( 2021 02:20 )    Color: Yellow / Appearance: Turbid / S.037 / pH: x  Gluc: x / Ketone: Trace  / Bili: Negative / Urobili: <2 mg/dL   Blood: x / Protein: 30 mg/dL / Nitrite: Negative   Leuk Esterase: Moderate / RBC: 5 /HPF / WBC 6 /HPF   Sq Epi: x / Non Sq Epi: 7 /HPF / Bacteria: Moderate        MICROBIOLOGY    Culture - Nose (collected 2021 17:13)  Source: .Nose  Preliminary Report (2021 20:16):    No Staphylococcus aureus isolated to date    Culture - Blood (collected 2021 19:40)  Source: .Blood Blood-Peripheral  Preliminary Report (2021 20:01):    No growth to date.        IMAGING  No interval imaging      COORDINATION OF CARE:  ---PCP: Jaylin Ken  ---Consultants: Cardiology (Signed off)

## 2021-11-27 NOTE — PROGRESS NOTE ADULT - PROBLEM SELECTOR PLAN 2
Intermittent reports of agitation, not engaging with staff or with provider teams. Collateral hx obtained from PCP indicates "if any dementia, it's very, very mild". Most likely hospital delirium in an elderly patient with ?polypharmacy, but given symptomatology, may be TME. Improving this morning even with haldol o/n. Borderline QTc after repeat haldol dosing over nights, would start a less QT prolonging medication.    Diagnostics  - NC HCT w/o acute intracranial pathology  - infectious work-up as below  - hyponatremia, hyperglycemia management as below    Therapeutics  - Delirium Precuations (frequent re-orientation, using  every time, windows open during the day)  - C/W Melatonin 6mg PO QHS  - START Seroquel 25mg PO QHS Intermittent reports of agitation, not engaging with staff or with provider teams. Collateral hx obtained from PCP indicates "if any dementia, it's very, very mild". Most likely hospital delirium in an elderly patient with ?polypharmacy, but given symptomatology, may be TME. Improving this morning even with haldol o/n. Borderline QTc after repeat haldol dosing over nights, would start a less QT prolonging medication.    Diagnostics  - NC HCT w/o acute intracranial pathology  - infectious work-up as below  - hyponatremia, hyperglycemia management as below    Therapeutics  - Delirium Precuations (frequent re-orientation, using  every time, windows open during the day)  - C/W Melatonin 6mg PO QHS  - C/W Seroquel 25mg PO QHS

## 2021-11-27 NOTE — PROGRESS NOTE ADULT - ATTENDING COMMENTS
89 year old female with Dm2, HTN, dementia, p/w chest pain, dyspnea, abdominal pain complicated by AMS (suspected delirium), and TAYLOR. CT abdomen unremarkable. Trops flat. Afebrile, HR 80-100s, -140s recently. CT head without acute findings. Elevated WBC, started on empiric abx w/ CTX and azithro (possible pna given new cough), negative blood and urine cultures. Cr improving after IVF. Patient's mental status significantly improved today after daughter and son in law came to visit. Patient seen awake, interactive, and communicative with family. No pain or complaints. Continues on CTX/azithro for 5 day course ending tomorrow. New O2 requirement at times of 1-2L NC. Will encourage activity, repositioning, incentive spirometer. If continued O2 requirement, will consider repeat lung imaging.

## 2021-11-27 NOTE — PROGRESS NOTE ADULT - PROBLEM SELECTOR PLAN 5
Creatinine 0.84 --> 1.56 --> 1.2. PCP reports renal dysfunction as OP but had normal creatinine on admission. Pre-renal most likely, but given hematuria, abd pain, will check lytes again and reassess FeNa & FeUrea to further characterize this problem. if persistent, may need ultrasound or repeat CT given difficult to obtain hx.   Dx:  - Urine lytes not collected, but had good response to fluids. Likely pre-renal in this context, will redose gentle fluid resuscitation    Rx:  - C/W LR 75cc/hr x10h Creatinine 0.84 --> 1.56 --> 1.2. PCP reports renal dysfunction as OP but had normal creatinine on admission. Pre-renal most likely, but given hematuria, abd pain, will check lytes again and reassess FeNa & FeUrea to further characterize this problem. if persistent, may need ultrasound or repeat CT given difficult to obtain hx.   Dx:  - Urine lytes not collected, but had good response to fluids.    Rx:  - CTM

## 2021-11-27 NOTE — PROGRESS NOTE ADULT - PROBLEM SELECTOR PLAN 6
Hyponatremia to 122 on presentation --> 130. SOsm 270 c/w hypoosmolar hyponatremia. Given history of multiple diuretics and reports of diuretic overuse as OP, likely hypovolemic hyponatremia from renal losses in this context (especially given inappropriately sodium rich urine - Kalpana > 100). UOsm in this context also inappropriately concentrated > SOsm, which could indicated inappropriate RASS/ADH activation ico hypovolemia vs SIADH/poor intake. Less likely SIADH with SUric acid >4.  Kalpana 103 on admission (inappropriately elevated, likely 2/2 diuretics).    Rx  - S/P 0.5L NS in ED & mild fluids on the floor now  - PO intake encouraged, may need further volume repletion carefully (as above)

## 2021-11-27 NOTE — PROGRESS NOTE ADULT - PROBLEM SELECTOR PLAN 8
Mild, normocytic anemia. Reportedly rx'd both famotidine & omeprazole. PPI increases risk for many things, particularly in elderly patients, and specifically can be a/w RONALD. Significantly iron deficient, but would not replete given rising leukocytosis, c/f infection.   - Fe 15, TIBC 256, Ferritin 123, TSAT 6%  - Estimated Iron Deficit: 508mg  - CONTINUE Home Folate 1mg PO QD

## 2021-11-27 NOTE — PROGRESS NOTE ADULT - ASSESSMENT
Ms. Estrada is a 89 year old Salvadorean-speaking woman with HTN, T2DM (A1c 8.2%), dementia (by report, NOS) who presents with acute on subacute chest pain with associated HASTINGS and abdominal pain whose differential is broad but includes ACS (incl. unstable angina vs NSTEMI), intra-abdominal pathologies (symptomatic worsening of hiatal hernia?). She may now also have altered mental status ico worsening leukocytosis despite continued lack of obvious etiology, though at this point with persistent cough and leukocytosis most likely pneumonia.

## 2021-11-27 NOTE — PROGRESS NOTE ADULT - PROBLEM SELECTOR PLAN 12
Hospital Bundle  Fluids: NPO except meds until dysphagia screening, then DASH   Electrolytes: Replete K > 4, Mg > 2, Phos > 3  Nutrition: Diet DASH/TLC (If passes dysphagia screening)  PPX  ---VTE: LVX  ---GI: Home famotidine 20mg  ---Resp: I/S  Access: PIV  Code Status: FULL CODE  Dispo: Pending medical stabilization, further evaluation  -- Called ?PCP Jaylin Ken [phone] (see chart note 11/24) Hospital Bundle  Fluids: PO ad michael  Electrolytes: Replete K > 4, Mg > 2, Phos > 3  Nutrition: Diet Pureed c Moderately Thick Liquids  PPX  ---VTE: LVX  ---GI: Home famotidine 20mg  ---Resp: I/S  Access: PIV  Code Status: FULL CODE  Dispo: Pending medical stabilization; family wants to take her home 11/28  -- Called ?PCP Jaylin Ken [phone] (see chart note 11/24)

## 2021-11-27 NOTE — PROGRESS NOTE ADULT - PROBLEM SELECTOR PLAN 9
SBP 140s in ED, as high as >170 p/t assessment by admitting team. Ultimately , goal would likely be SBP < 150 in an ideal world, but given she may be pain, would not aggressively treat right now. OP regimen is quite extensive, but wouldn't re-start it all right away. SBP lower than admission stably 110s-120s.  - CONTINUE Home Nifedipine XL 30mg PO BID  - CONTINUE Home Isosorbide mononitrate ER (IMDUR) 30mg PO QD  - HOLDing Home Clonidine 0.1mg PO BID (unclear indication)  - HOLDing Home Enalapril-HCTZ 10mg-25mg PO QD (given hyponatremia)  - HOLDing Home Hydralazine 50mg PO TID

## 2021-11-27 NOTE — PROGRESS NOTE ADULT - PROBLEM SELECTOR PLAN 3
Pt presents with CP of variable timeline (based on pt vs collateral history anywhere from 1d - 1 month). Trop rising in ED (with change > 6), ECG with LBBB (unclear if new or old w/o records; w/o meeting Scarbossa criteria) but without dynamic changes or continued uptrending troponin. Possibly some supply/demand mismatch (i.e. NSTEMI Type 2) without acute vasoocclusive event, though also considering TAYLOR that had been seen.  Dx:  - Cardiology C/S, Appreciate Recs - less likely ACS, no need for echo as would not     Rx:  - CONTINUE Home ASA 81mg PO QD (s/p addl 162 in ED)  - DIScontinue Home Clopidogrel 75mg PO QD (unclear indication)  - HOLDing Home ACEi/HCTZ d/t Hyponatremia as below  - HOLDing Home Furosemide 20mg PO QD (unclear EDW, ZEUS)

## 2021-11-27 NOTE — PROGRESS NOTE ADULT - PROBLEM SELECTOR PLAN 4
Pt has non-specific abdominal pain with imaging notable for hiatal hernia and scattered, ? old granulomatous disease. UA shows microscopic hematuria w/o WBC, bacteria, LE, or nitrates, which is a/w a broad differential that includes trauma (less likely but reportedly may have fallen), nephrolithiasis (which may also cause abd pain, n/v).   DDx:  - Nephrolithiasis: microscopic hematuria w/ abd pain/n/v; no stone identified on CT; no hydronephrosis  - SBO: h/o abdominal surgery but CT w/o obstruction, pt no longer vomiting; less likely   - Mesentric Ischemia: elderly woman; PCP says she has "cardiac history" but unknown specific diagnoses or recent treatments; no bloody BMs, abdomen not alarming. May be 2/2 constipation at this point given no BMs x3-4d.     Diagnostics  - Lipase WNL  - Holding off on repeat imaging at this time    Therapeutics  - C/W Ondansetron 4mg PO Q6H PRN  - C/W Bowel Regimen, may need enema

## 2021-11-28 ENCOUNTER — TRANSCRIPTION ENCOUNTER (OUTPATIENT)
Age: 86
End: 2021-11-28

## 2021-11-28 LAB
ALBUMIN SERPL ELPH-MCNC: 2.8 G/DL — LOW (ref 3.3–5)
ALP SERPL-CCNC: 87 U/L — SIGNIFICANT CHANGE UP (ref 40–120)
ALT FLD-CCNC: 9 U/L — SIGNIFICANT CHANGE UP (ref 4–33)
ANION GAP SERPL CALC-SCNC: 12 MMOL/L — SIGNIFICANT CHANGE UP (ref 7–14)
AST SERPL-CCNC: 15 U/L — SIGNIFICANT CHANGE UP (ref 4–32)
BASOPHILS # BLD AUTO: 0.05 K/UL — SIGNIFICANT CHANGE UP (ref 0–0.2)
BASOPHILS NFR BLD AUTO: 0.3 % — SIGNIFICANT CHANGE UP (ref 0–2)
BILIRUB SERPL-MCNC: 0.3 MG/DL — SIGNIFICANT CHANGE UP (ref 0.2–1.2)
BUN SERPL-MCNC: 23 MG/DL — SIGNIFICANT CHANGE UP (ref 7–23)
CALCIUM SERPL-MCNC: 8.5 MG/DL — SIGNIFICANT CHANGE UP (ref 8.4–10.5)
CHLORIDE SERPL-SCNC: 97 MMOL/L — LOW (ref 98–107)
CO2 SERPL-SCNC: 24 MMOL/L — SIGNIFICANT CHANGE UP (ref 22–31)
CREAT SERPL-MCNC: 0.93 MG/DL — SIGNIFICANT CHANGE UP (ref 0.5–1.3)
EOSINOPHIL # BLD AUTO: 0.17 K/UL — SIGNIFICANT CHANGE UP (ref 0–0.5)
EOSINOPHIL NFR BLD AUTO: 1.1 % — SIGNIFICANT CHANGE UP (ref 0–6)
GLUCOSE BLDC GLUCOMTR-MCNC: 123 MG/DL — HIGH (ref 70–99)
GLUCOSE BLDC GLUCOMTR-MCNC: 157 MG/DL — HIGH (ref 70–99)
GLUCOSE BLDC GLUCOMTR-MCNC: 196 MG/DL — HIGH (ref 70–99)
GLUCOSE BLDC GLUCOMTR-MCNC: 87 MG/DL — SIGNIFICANT CHANGE UP (ref 70–99)
GLUCOSE SERPL-MCNC: 123 MG/DL — HIGH (ref 70–99)
HCT VFR BLD CALC: 29.6 % — LOW (ref 34.5–45)
HGB BLD-MCNC: 9.9 G/DL — LOW (ref 11.5–15.5)
IANC: 10.9 K/UL — HIGH (ref 1.5–8.5)
IMM GRANULOCYTES NFR BLD AUTO: 1.1 % — SIGNIFICANT CHANGE UP (ref 0–1.5)
LYMPHOCYTES # BLD AUTO: 17.6 % — SIGNIFICANT CHANGE UP (ref 13–44)
LYMPHOCYTES # BLD AUTO: 2.62 K/UL — SIGNIFICANT CHANGE UP (ref 1–3.3)
MAGNESIUM SERPL-MCNC: 2 MG/DL — SIGNIFICANT CHANGE UP (ref 1.6–2.6)
MCHC RBC-ENTMCNC: 28.1 PG — SIGNIFICANT CHANGE UP (ref 27–34)
MCHC RBC-ENTMCNC: 33.4 GM/DL — SIGNIFICANT CHANGE UP (ref 32–36)
MCV RBC AUTO: 84.1 FL — SIGNIFICANT CHANGE UP (ref 80–100)
MONOCYTES # BLD AUTO: 0.99 K/UL — HIGH (ref 0–0.9)
MONOCYTES NFR BLD AUTO: 6.6 % — SIGNIFICANT CHANGE UP (ref 2–14)
NEUTROPHILS # BLD AUTO: 10.9 K/UL — HIGH (ref 1.8–7.4)
NEUTROPHILS NFR BLD AUTO: 73.3 % — SIGNIFICANT CHANGE UP (ref 43–77)
NRBC # BLD: 0 /100 WBCS — SIGNIFICANT CHANGE UP
NRBC # FLD: 0 K/UL — SIGNIFICANT CHANGE UP
PHOSPHATE SERPL-MCNC: 3.2 MG/DL — SIGNIFICANT CHANGE UP (ref 2.5–4.5)
PLATELET # BLD AUTO: 387 K/UL — SIGNIFICANT CHANGE UP (ref 150–400)
POTASSIUM SERPL-MCNC: 3.8 MMOL/L — SIGNIFICANT CHANGE UP (ref 3.5–5.3)
POTASSIUM SERPL-SCNC: 3.8 MMOL/L — SIGNIFICANT CHANGE UP (ref 3.5–5.3)
PROT SERPL-MCNC: 5.9 G/DL — LOW (ref 6–8.3)
RBC # BLD: 3.52 M/UL — LOW (ref 3.8–5.2)
RBC # FLD: 15.2 % — HIGH (ref 10.3–14.5)
SODIUM SERPL-SCNC: 133 MMOL/L — LOW (ref 135–145)
WBC # BLD: 14.89 K/UL — HIGH (ref 3.8–10.5)
WBC # FLD AUTO: 14.89 K/UL — HIGH (ref 3.8–10.5)

## 2021-11-28 PROCEDURE — 99233 SBSQ HOSP IP/OBS HIGH 50: CPT | Mod: GC

## 2021-11-28 RX ORDER — LOPERAMIDE HCL 2 MG
2 TABLET ORAL
Qty: 0 | Refills: 0 | DISCHARGE

## 2021-11-28 RX ORDER — OMEPRAZOLE 10 MG/1
1 CAPSULE, DELAYED RELEASE ORAL
Qty: 0 | Refills: 0 | DISCHARGE

## 2021-11-28 RX ORDER — FUROSEMIDE 40 MG
1 TABLET ORAL
Qty: 0 | Refills: 0 | DISCHARGE

## 2021-11-28 RX ORDER — NITROGLYCERIN 6.5 MG
1 CAPSULE, EXTENDED RELEASE ORAL
Qty: 0 | Refills: 0 | DISCHARGE

## 2021-11-28 RX ORDER — INSULIN LISPRO 100/ML
VIAL (ML) SUBCUTANEOUS AT BEDTIME
Refills: 0 | Status: DISCONTINUED | OUTPATIENT
Start: 2021-11-28 | End: 2021-11-29

## 2021-11-28 RX ORDER — ONDANSETRON 8 MG/1
1 TABLET, FILM COATED ORAL
Qty: 0 | Refills: 0 | DISCHARGE
Start: 2021-11-28

## 2021-11-28 RX ORDER — HYDRALAZINE HCL 50 MG
1 TABLET ORAL
Qty: 0 | Refills: 0 | DISCHARGE

## 2021-11-28 RX ORDER — CLOPIDOGREL BISULFATE 75 MG/1
1 TABLET, FILM COATED ORAL
Qty: 0 | Refills: 0 | DISCHARGE

## 2021-11-28 RX ORDER — ENALAPRIL MALEATE AND HYDROCHLOROTHIAZIDE 10; 25 MG/1; MG/1
1 TABLET ORAL
Qty: 0 | Refills: 0 | DISCHARGE

## 2021-11-28 RX ORDER — ISOSORBIDE MONONITRATE 60 MG/1
1 TABLET, EXTENDED RELEASE ORAL
Qty: 0 | Refills: 0 | DISCHARGE
Start: 2021-11-28

## 2021-11-28 RX ORDER — NIFEDIPINE 30 MG
1 TABLET, EXTENDED RELEASE 24 HR ORAL
Qty: 0 | Refills: 0 | DISCHARGE

## 2021-11-28 RX ORDER — ONDANSETRON 8 MG/1
2 TABLET, FILM COATED ORAL
Qty: 0 | Refills: 0 | DISCHARGE

## 2021-11-28 RX ORDER — SENNA PLUS 8.6 MG/1
2 TABLET ORAL
Qty: 0 | Refills: 0 | DISCHARGE
Start: 2021-11-28

## 2021-11-28 RX ORDER — ISOSORBIDE MONONITRATE 60 MG/1
1 TABLET, EXTENDED RELEASE ORAL
Qty: 0 | Refills: 0 | DISCHARGE

## 2021-11-28 RX ORDER — NIFEDIPINE 30 MG
1 TABLET, EXTENDED RELEASE 24 HR ORAL
Qty: 0 | Refills: 0 | DISCHARGE
Start: 2021-11-28

## 2021-11-28 RX ORDER — POLYETHYLENE GLYCOL 3350 17 G/17G
17 POWDER, FOR SOLUTION ORAL
Qty: 0 | Refills: 0 | DISCHARGE
Start: 2021-11-28

## 2021-11-28 RX ORDER — LIPASE/PROTEASE/AMYLASE 16-48-48K
1 CAPSULE,DELAYED RELEASE (ENTERIC COATED) ORAL
Qty: 0 | Refills: 0 | DISCHARGE

## 2021-11-28 RX ORDER — DICLOFENAC SODIUM 75 MG/1
1 TABLET, DELAYED RELEASE ORAL
Qty: 0 | Refills: 0 | DISCHARGE

## 2021-11-28 RX ORDER — ASPIRIN/CALCIUM CARB/MAGNESIUM 324 MG
1 TABLET ORAL
Qty: 0 | Refills: 0 | DISCHARGE

## 2021-11-28 RX ORDER — POTASSIUM CHLORIDE 20 MEQ
20 PACKET (EA) ORAL ONCE
Refills: 0 | Status: COMPLETED | OUTPATIENT
Start: 2021-11-28 | End: 2021-11-28

## 2021-11-28 RX ORDER — LANOLIN ALCOHOL/MO/W.PET/CERES
2 CREAM (GRAM) TOPICAL
Qty: 0 | Refills: 0 | DISCHARGE
Start: 2021-11-28

## 2021-11-28 RX ORDER — ASPIRIN/CALCIUM CARB/MAGNESIUM 324 MG
1 TABLET ORAL
Qty: 0 | Refills: 0 | DISCHARGE
Start: 2021-11-28

## 2021-11-28 RX ADMIN — Medication 1 CAPSULE(S): at 09:15

## 2021-11-28 RX ADMIN — Medication 8 UNIT(S): at 09:13

## 2021-11-28 RX ADMIN — ENOXAPARIN SODIUM 40 MILLIGRAM(S): 100 INJECTION SUBCUTANEOUS at 13:02

## 2021-11-28 RX ADMIN — Medication 8 UNIT(S): at 18:07

## 2021-11-28 RX ADMIN — Medication 30 MILLIGRAM(S): at 05:11

## 2021-11-28 RX ADMIN — DORZOLAMIDE HYDROCHLORIDE 2 DROP(S): 20 SOLUTION/ DROPS OPHTHALMIC at 02:00

## 2021-11-28 RX ADMIN — Medication 1 CAPSULE(S): at 18:08

## 2021-11-28 RX ADMIN — CEFTRIAXONE 100 MILLIGRAM(S): 500 INJECTION, POWDER, FOR SOLUTION INTRAMUSCULAR; INTRAVENOUS at 13:24

## 2021-11-28 RX ADMIN — Medication 1 MILLIGRAM(S): at 13:01

## 2021-11-28 RX ADMIN — DORZOLAMIDE HYDROCHLORIDE 2 DROP(S): 20 SOLUTION/ DROPS OPHTHALMIC at 18:07

## 2021-11-28 RX ADMIN — Medication 1: at 13:00

## 2021-11-28 RX ADMIN — Medication 6 MILLIGRAM(S): at 21:29

## 2021-11-28 RX ADMIN — Medication 30 MILLIGRAM(S): at 18:08

## 2021-11-28 RX ADMIN — SENNA PLUS 2 TABLET(S): 8.6 TABLET ORAL at 21:29

## 2021-11-28 RX ADMIN — ISOSORBIDE MONONITRATE 30 MILLIGRAM(S): 60 TABLET, EXTENDED RELEASE ORAL at 13:01

## 2021-11-28 RX ADMIN — Medication 1 CAPSULE(S): at 13:01

## 2021-11-28 RX ADMIN — Medication 1: at 09:14

## 2021-11-28 RX ADMIN — Medication 8 UNIT(S): at 13:00

## 2021-11-28 RX ADMIN — QUETIAPINE FUMARATE 25 MILLIGRAM(S): 200 TABLET, FILM COATED ORAL at 21:29

## 2021-11-28 RX ADMIN — Medication 81 MILLIGRAM(S): at 13:02

## 2021-11-28 RX ADMIN — FAMOTIDINE 20 MILLIGRAM(S): 10 INJECTION INTRAVENOUS at 13:01

## 2021-11-28 RX ADMIN — INSULIN GLARGINE 16 UNIT(S): 100 INJECTION, SOLUTION SUBCUTANEOUS at 23:16

## 2021-11-28 RX ADMIN — POLYETHYLENE GLYCOL 3350 17 GRAM(S): 17 POWDER, FOR SOLUTION ORAL at 21:29

## 2021-11-28 RX ADMIN — DORZOLAMIDE HYDROCHLORIDE 2 DROP(S): 20 SOLUTION/ DROPS OPHTHALMIC at 09:16

## 2021-11-28 NOTE — PROGRESS NOTE ADULT - PROBLEM SELECTOR PLAN 5
Creatinine 0.84 --> 1.56 --> 1.2. PCP reports renal dysfunction as OP but had normal creatinine on admission. Pre-renal most likely, but given hematuria, abd pain, will check lytes again and reassess FeNa & FeUrea to further characterize this problem. if persistent, may need ultrasound or repeat CT given difficult to obtain hx.   Dx:  - Urine lytes not collected, but had good response to fluids.    Rx:  - CTM

## 2021-11-28 NOTE — PROGRESS NOTE ADULT - PROBLEM SELECTOR PLAN 2
Intermittent reports of agitation, not engaging with staff or with provider teams. Collateral hx obtained from PCP indicates "if any dementia, it's very, very mild". Most likely hospital delirium in an elderly patient with ?polypharmacy, but given symptomatology, may be TME. Improving this morning even with haldol o/n. Borderline QTc after repeat haldol dosing over nights, would start a less QT prolonging medication.    Diagnostics  - NC HCT w/o acute intracranial pathology  - infectious work-up as below  - hyponatremia, hyperglycemia management as below    Therapeutics  - Delirium Precuations (frequent re-orientation, using  every time, windows open during the day)  - C/W Melatonin 6mg PO QHS  - C/W Seroquel 25mg PO QHS

## 2021-11-28 NOTE — DISCHARGE NOTE PROVIDER - NSDCCPCAREPLAN_GEN_ALL_CORE_FT
PRINCIPAL DISCHARGE DIAGNOSIS  Diagnosis: PNA (pneumonia)  Assessment and Plan of Treatment: Presented with      SECONDARY DISCHARGE DIAGNOSES  Diagnosis: Altered mental status  Assessment and Plan of Treatment: Had significant delirium during hospitalization, which is reportedly consistent with her previous hospitalizations.   - managed well with antibiotics & metalonin, seroquel 25 QHS    Diagnosis: Hyponatremia  Assessment and Plan of Treatment: Hyponatremic to 122 ico 3d of nausea/vomiting, PCP reporting that she has been counseled to d/c furosemide overuse but pt continues. Likely multifactorial (hypovolemia from GI & renal losses). Improved with fluids, holding diuretics.  - Hold diuresis  - If necessary, would resume low dose furosemide and NOT use Hydrochlorothiazide as this is a/w significant natriuresis (compared to lasix)    Diagnosis: NSTEMI (non-ST elevation myocardial infarction)  Assessment and Plan of Treatment: P/w chest pain, dyspnea.    Diagnosis: TAYLOR (acute kidney injury)  Assessment and Plan of Treatment:     Diagnosis: Normocytic anemia  Assessment and Plan of Treatment:     Diagnosis: Diabetes mellitus  Assessment and Plan of Treatment:     Diagnosis: Hyperlipidemia  Assessment and Plan of Treatment:     Diagnosis: Risk for falls  Assessment and Plan of Treatment:      PRINCIPAL DISCHARGE DIAGNOSIS  Diagnosis: PNA (pneumonia)  Assessment and Plan of Treatment: P/W Chest Pain, Abd Pain N/V. Workup remarkable for RLL opacity (c/f atelectasis vs PNA), leukocytosis, borderline fever c/f infectious process. ACS workup negative.  - C/W Cefpodoxime PO through 12/2/2021      SECONDARY DISCHARGE DIAGNOSES  Diagnosis: Altered mental status  Assessment and Plan of Treatment: Had significant delirium during hospitalization, which is reportedly consistent with her previous hospitalizations.   - managed well with antibiotics & metalonin, seroquel 25 QHS while inpatient  - would NOT continue Seroquel on Discsharge as most likely hospital delirium    Diagnosis: Hyponatremia  Assessment and Plan of Treatment: Hyponatremic to 122 ico 3d of nausea/vomiting, PCP reporting that she has been counseled to d/c furosemide overuse but pt continues. Likely multifactorial (hypovolemia from GI & renal losses). Improved with fluids, holding diuretics.  - Hold diuresis  - If necessary, would resume low dose furosemide and NOT use Hydrochlorothiazide as this is a/w significant natriuresis (compared to lasix)    Diagnosis: NSTEMI (non-ST elevation myocardial infarction)  Assessment and Plan of Treatment: P/w chest pain, dyspnea. Trop indeterminately elevated but stable, less likely NSTEMI 1 or other ACS but potentially supply/demand mismatch (NSTEMI 2)    Diagnosis: TAYLOR (acute kidney injury)  Assessment and Plan of Treatment: Mildly elevated Cr x1 day, likely pre-renal TAYLOR d/t poor PO intake. Improved p/t discharge.    Diagnosis: Normocytic anemia  Assessment and Plan of Treatment: Fe 15, TIBC 256, Ferritin 123, TSAT 6%. May benefit from discontinuation of any PPI's & iron supplementation but not initiated on this hospitalization d/t c/f infection.   - may f/u iron supplmentation as OP    Diagnosis: Diabetes mellitus  Assessment and Plan of Treatment: A1c 8.2%, well controlled for age. Endo following while inpatient.  - Discharge Plan:  TRESIBA 15U QHS  Novolog 7U Pre-Meal    Diagnosis: Hyperlipidemia  Assessment and Plan of Treatment: Has diagnosis of HLD. Given age, would not initiate on statin as risks (hepatic injury, polypharmacy) outweigh potential benefits (long-term HLD management).    Diagnosis: Risk for falls  Assessment and Plan of Treatment: Noted to have old/healed rib fractures on right side on initial evaluation. Wondering if she may have fallen or is at risk of falls at home. Will need Home PT for evaluation and management of physical space at home. Has 24h HHA  - Home PT

## 2021-11-28 NOTE — DISCHARGE NOTE PROVIDER - CARE PROVIDERS DIRECT ADDRESSES
,[email or web address],DirectAddress_Unknown,DirectAddress_Unknown

## 2021-11-28 NOTE — DISCHARGE NOTE PROVIDER - CARE PROVIDER_API CALL
Elicia Espinal)  EndocrinologyMetabDiabetes; Internal Medicine  [address] [facility], [address]st [place] [address]  Phone: [phone]  Fax: [phone]  Established Patient  Follow Up Time: 1 week    ISABEL TORRES  Cardiovascular Diseases  [address] [place] [address]  Phone: [phone]  Fax: [phone]  Established Patient  Follow Up Time: 1 week    QING RG  Internal Medicine  [address] [place] [address]  Phone: [phone]  Fax: [phone]  Established Patient  Follow Up Time: 1 week

## 2021-11-28 NOTE — PROGRESS NOTE ADULT - ATTENDING COMMENTS
89 year old female with DM2, HTN, dementia, p/w chest pain, dyspnea, abdominal pain complicated by AMS (suspected delirium), and TAYLOR. CT abdomen unremarkable. Trops flat. Afebrile, HR 80-100s, -140s recently. CT head without acute findings. Elevated WBC, started on empiric abx w/ CTX and azithro (suspected pna given new cough), negative blood and urine cultures. WBC downtrending. Also with TAYLOR, now cr improving after IVF. Patient's mental status significantly improved after daughter came to visit. Patient again today awake, interactive, and communicative with daughter. No pain or complaints. Continues on abx for planned 7 day course. At times appears to have been placed on nasal cannula - O2 sat repeated, not requiring supplemental O2, NC removed. Continue to encourage activity, repositioning, incentive spirometer. CM assisting in reinstating HHA. Daughter updated at bedside.

## 2021-11-28 NOTE — DISCHARGE NOTE PROVIDER - NSDCFUADDINST_GEN_ALL_CORE_FT
You were evaluated in the Emergency Room and then hospital due to concern for severe chest and abdominal pain. Your heart was evaluated without concern for persistent even (like a heart attack from a clot), which we did not see. You did have a high white blood cell count and borderline fever and a right-lower lung lobe opacity that made us concerned for a severe pneumonia. We managed this with antibiotics and your condition improved.    You should:  -  your prescription for antibiotics and continue taking these through 12/2/2021 even if you feel better.  - Work with physical therapy to improve your strength and balance  - Return to the hospital if your symptoms return or worsen or if you have any falls as these can be life threatening  - Ask your doctor about your medications and see if you can reduce the number of medications that you are taking

## 2021-11-28 NOTE — PROGRESS NOTE ADULT - SUBJECTIVE AND OBJECTIVE BOX
Sammy Salcedo M.D.  [facility] PGY-1    PROGRESS NOTE:     Patient is a 89y old  Female who presents with a chief complaint of Chest Pain (27 Nov 2021 08:18)      -OVERNIGHT EVENTS-      -SUBJECTIVE-      MEDICATIONS  (STANDING):  aspirin enteric coated 81 milliGRAM(s) Oral daily  cefTRIAXone   IVPB 1000 milliGRAM(s) IV Intermittent every 24 hours  cefTRIAXone   IVPB      dextrose 40% Gel 15 Gram(s) Oral once  dextrose 5%. 1000 milliLiter(s) (50 mL/Hr) IV Continuous <Continuous>  dextrose 5%. 1000 milliLiter(s) (100 mL/Hr) IV Continuous <Continuous>  dextrose 50% Injectable 25 Gram(s) IV Push once  dextrose 50% Injectable 12.5 Gram(s) IV Push once  dextrose 50% Injectable 25 Gram(s) IV Push once  dorzolamide 2% Ophthalmic Solution 2 Drop(s) Both EYES every 8 hours  enoxaparin Injectable 40 milliGRAM(s) SubCutaneous daily  famotidine    Tablet 20 milliGRAM(s) Oral daily  folic acid 1 milliGRAM(s) Oral daily  glucagon  Injectable 1 milliGRAM(s) IntraMuscular once  insulin glargine Injectable (LANTUS) 16 Unit(s) SubCutaneous at bedtime  insulin lispro (ADMELOG) corrective regimen sliding scale   SubCutaneous three times a day before meals  insulin lispro Injectable (ADMELOG) 8 Unit(s) SubCutaneous three times a day before meals  isosorbide   mononitrate ER Tablet (IMDUR) 30 milliGRAM(s) Oral daily  melatonin 6 milliGRAM(s) Oral at bedtime  NIFEdipine XL 30 milliGRAM(s) Oral two times a day  pancrelipase  (CREON 24,000 Lipase Units) 1 Capsule(s) Oral three times a day with meals  polyethylene glycol 3350 17 Gram(s) Oral at bedtime  QUEtiapine 25 milliGRAM(s) Oral at bedtime  senna 2 Tablet(s) Oral at bedtime    MEDICATIONS  (PRN):  guaiFENesin Oral Liquid (Sugar-Free) 200 milliGRAM(s) Oral every 6 hours PRN Cough  ondansetron    Tablet 4 milliGRAM(s) Oral daily PRN Nausea and/or Vomiting      -OBJECTIVE-    CAPILLARY BLOOD GLUCOSE      POCT Blood Glucose.: 220 mg/dL (27 Nov 2021 21:59)  POCT Blood Glucose.: 143 mg/dL (27 Nov 2021 17:37)  POCT Blood Glucose.: 111 mg/dL (27 Nov 2021 12:24)  POCT Blood Glucose.: 220 mg/dL (27 Nov 2021 09:07)    I&O's Summary      VITAL SIGNS  Vital Signs Last 24 Hrs  T(C): 36.8 (28 Nov 2021 05:10), Max: 37 (27 Nov 2021 13:30)  T(F): 98.3 (28 Nov 2021 05:10), Max: 98.6 (27 Nov 2021 13:30)  HR: 91 (28 Nov 2021 05:10) (88 - 96)  BP: 144/62 (28 Nov 2021 05:10) (125/51 - 144/62)  BP(mean): --  RR: 18 (28 Nov 2021 05:10) (18 - 19)  SpO2: 97% (28 Nov 2021 05:10) (95% - 97%)    PHYSICAL EXAM:  GENERAL/CONSTITUTIONAL: NAD, Awake, AOx3 (person, place, time)  HEENT: NCAT  ---EYES: no scleral icterus, EOMI, PERRLA  ---ENMT: MMM,   ---NECK: No palpable masses; no thyromegaly  CARDIO: RRR. Normal S1/S2, no m/r/g.  RESP: Normal respiratory effort; CTAB, no w/r/r  ABD: Soft, NTND; +BS.   : No CVAT.   MSK: No obvious deformity or ROM deficit.  ---EXTREMITIES: No peripheral edema. Peripheral pulses 2+ x4.  SKIN: Warm, dry. No rashes.   NEURO: Moves all four extremities spontaneously  PSYCH: Appropriate mood & affect. No VH/AH. No SI/HI.    LABS:                        10.2   19.24 )-----------( 378      ( 27 Nov 2021 13:14 )             29.1     11-27    131<L>  |  95<L>  |  27<H>  ----------------------------<  101<H>  3.7   |  26  |  0.93    Ca    8.9      27 Nov 2021 13:14  Phos  3.4     11-27  Mg     2.10     11-27    TPro  6.1  /  Alb  3.0<L>  /  TBili  0.3  /  DBili  x   /  AST  12  /  ALT  9   /  AlkPhos  84  11-27              MICROBIOLOGY    Culture - Nose (collected 25 Nov 2021 11:00)  Source: .Nose  Final Report (27 Nov 2021 19:21):    No staphylococcus aureus isolated.    "PCR is more Sensitive for identifying MRSA/MSSA."        IMAGING        COORDINATION OF CARE:  ---PCP:  ---Consultants:   Sammy Salcedo M.D.  [facility] PGY-1    PROGRESS NOTE:     Patient is a 89y old  Female who presents with a chief complaint of Chest Pain (27 Nov 2021 08:18)    -OVERNIGHT EVENTS-  No acute overnight events.   -SUBJECTIVE-  Afebrile. No pain or complaints. Still with intermittent cough.     MEDICATIONS  (STANDING):  aspirin enteric coated 81 milliGRAM(s) Oral daily  cefTRIAXone   IVPB 1000 milliGRAM(s) IV Intermittent every 24 hours  cefTRIAXone   IVPB      dextrose 40% Gel 15 Gram(s) Oral once  dextrose 5%. 1000 milliLiter(s) (50 mL/Hr) IV Continuous <Continuous>  dextrose 5%. 1000 milliLiter(s) (100 mL/Hr) IV Continuous <Continuous>  dextrose 50% Injectable 25 Gram(s) IV Push once  dextrose 50% Injectable 12.5 Gram(s) IV Push once  dextrose 50% Injectable 25 Gram(s) IV Push once  dorzolamide 2% Ophthalmic Solution 2 Drop(s) Both EYES every 8 hours  enoxaparin Injectable 40 milliGRAM(s) SubCutaneous daily  famotidine    Tablet 20 milliGRAM(s) Oral daily  folic acid 1 milliGRAM(s) Oral daily  glucagon  Injectable 1 milliGRAM(s) IntraMuscular once  insulin glargine Injectable (LANTUS) 16 Unit(s) SubCutaneous at bedtime  insulin lispro (ADMELOG) corrective regimen sliding scale   SubCutaneous three times a day before meals  insulin lispro Injectable (ADMELOG) 8 Unit(s) SubCutaneous three times a day before meals  isosorbide   mononitrate ER Tablet (IMDUR) 30 milliGRAM(s) Oral daily  melatonin 6 milliGRAM(s) Oral at bedtime  NIFEdipine XL 30 milliGRAM(s) Oral two times a day  pancrelipase  (CREON 24,000 Lipase Units) 1 Capsule(s) Oral three times a day with meals  polyethylene glycol 3350 17 Gram(s) Oral at bedtime  QUEtiapine 25 milliGRAM(s) Oral at bedtime  senna 2 Tablet(s) Oral at bedtime    MEDICATIONS  (PRN):  guaiFENesin Oral Liquid (Sugar-Free) 200 milliGRAM(s) Oral every 6 hours PRN Cough  ondansetron    Tablet 4 milliGRAM(s) Oral daily PRN Nausea and/or Vomiting    -OBJECTIVE-    CAPILLARY BLOOD GLUCOSE    POCT Blood Glucose.: 220 mg/dL (27 Nov 2021 21:59)  POCT Blood Glucose.: 143 mg/dL (27 Nov 2021 17:37)  POCT Blood Glucose.: 111 mg/dL (27 Nov 2021 12:24)  POCT Blood Glucose.: 220 mg/dL (27 Nov 2021 09:07)    I&O's Summary    VITAL SIGNS  Vital Signs Last 24 Hrs  T(C): 36.8 (28 Nov 2021 05:10), Max: 37 (27 Nov 2021 13:30)  T(F): 98.3 (28 Nov 2021 05:10), Max: 98.6 (27 Nov 2021 13:30)  HR: 91 (28 Nov 2021 05:10) (88 - 96)  BP: 144/62 (28 Nov 2021 05:10) (125/51 - 144/62)  BP(mean): --  RR: 18 (28 Nov 2021 05:10) (18 - 19)  SpO2: 97% (28 Nov 2021 05:10) (95% - 97%)    PHYSICAL EXAM:  GENERAL/CONSTITUTIONAL: NAD, Awake  HEENT: NCAT  ---EYES: no scleral icterus, EOMI, PERRLA  ---ENMT: MMM,   ---NECK: No palpable masses; no thyromegaly  CARDIO: RRR. Normal S1/S2, no m/r/g.  RESP: Normal respiratory effort; CTAB, no w/r/r  ABD: Soft, NTND; +BS.   : No CVAT.   MSK: No obvious deformity or ROM deficit.  ---EXTREMITIES: No peripheral edema. Peripheral pulses 2+ x4.  SKIN: Warm, dry. No rashes.   NEURO: Moves all four extremities spontaneously    LABS:                        10.2   19.24 )-----------( 378      ( 27 Nov 2021 13:14 )             29.1     11-27    131<L>  |  95<L>  |  27<H>  ----------------------------<  101<H>  3.7   |  26  |  0.93    Ca    8.9      27 Nov 2021 13:14  Phos  3.4     11-27  Mg     2.10     11-27    TPro  6.1  /  Alb  3.0<L>  /  TBili  0.3  /  DBili  x   /  AST  12  /  ALT  9   /  AlkPhos  84  11-27      MICROBIOLOGY    Culture - Nose (collected 25 Nov 2021 11:00)  Source: .Nose  Final Report (27 Nov 2021 19:21):    No staphylococcus aureus isolated.    "PCR is more Sensitive for identifying MRSA/MSSA."

## 2021-11-28 NOTE — PROGRESS NOTE ADULT - PROBLEM SELECTOR PLAN 12
Hospital Bundle  Fluids: PO ad michael  Electrolytes: Replete K > 4, Mg > 2, Phos > 3  Nutrition: Diet Pureed c Moderately Thick Liquids  PPX  ---VTE: LVX  ---GI: Home famotidine 20mg  ---Resp: I/S  Access: PIV  Code Status: FULL CODE  Dispo: Pending medical stabilization; family wants to take her home 11/28  -- Called ?PCP Jaylin Ken [phone] (see chart note 11/24)

## 2021-11-28 NOTE — PROGRESS NOTE ADULT - ASSESSMENT
Ms. Estrada is a 89 year old Citizen of [place]-speaking woman with HTN, T2DM (A1c 8.2%), dementia (by report, NOS) who presents with acute on subacute chest pain with associated HASTINGS and abdominal pain whose differential is broad but includes ACS (incl. unstable angina vs NSTEMI), intra-abdominal pathologies (symptomatic worsening of hiatal hernia?). She may now also have altered mental status ico worsening leukocytosis despite continued lack of obvious etiology, though at this point with persistent cough and leukocytosis most likely pneumonia.

## 2021-11-28 NOTE — DISCHARGE NOTE PROVIDER - NSDCMRMEDTOKEN_GEN_ALL_CORE_FT
aspirin 81 mg oral delayed release tablet: 1 tab(s) orally once a day  Azopt 1% ophthalmic suspension: 1 drop(s) in each eye 2 times a day  cefpodoxime 200 mg oral tablet: 1 tab(s) orally 2 times a day   ergocalciferol 1.25 mg (50,000 intl units) oral capsule: 1 cap(s) orally once a week  famotidine 20 mg oral tablet: 1 tab(s) orally once a day  folic acid 1 mg oral tablet: 1 tab(s) orally once a day  isosorbide mononitrate 30 mg oral tablet, extended release: 1 tab(s) orally once a day  melatonin 3 mg oral tablet: 2 tab(s) orally once a day (at bedtime)  NIFEdipine 30 mg oral tablet, extended release: 1 tab(s) orally 2 times a day  NovoLOG FlexPen 100 units/mL injectable solution: 14 unit(s) subcutaneous before breakfast  6 unit(s) subcutaneous before lunch  8 unit(s) subcutaneous before dinner  ondansetron 4 mg oral tablet: 1 tab(s) orally once a day, As needed, Nausea and/or Vomiting  polyethylene glycol 3350 oral powder for reconstitution: 17 gram(s) orally once a day (at bedtime)  senna oral tablet: 2 tab(s) orally once a day (at bedtime)  Tresiba FlexTouch 100 units/mL subcutaneous solution: 11 unit(s) subcutaneous once a day   aspirin 81 mg oral delayed release tablet: 1 tab(s) orally once a day  Azopt 1% ophthalmic suspension: 1 drop(s) in each eye 2 times a day  cefpodoxime 200 mg oral tablet: 1 tab(s) orally 2 times a day   ergocalciferol 1.25 mg (50,000 intl units) oral capsule: 1 cap(s) orally once a week  famotidine 20 mg oral tablet: 1 tab(s) orally once a day  folic acid 1 mg oral tablet: 1 tab(s) orally once a day  isosorbide mononitrate 30 mg oral tablet, extended release: 1 tab(s) orally once a day  melatonin 3 mg oral tablet: 2 tab(s) orally once a day (at bedtime)  NIFEdipine 30 mg oral tablet, extended release: 1 tab(s) orally 2 times a day  NovoLOG FlexPen 100 units/mL injectable solution: 7 unit(s) subcutaneous 3 times a day  before meals  ondansetron 4 mg oral tablet: 1 tab(s) orally once a day, As needed, Nausea and/or Vomiting  polyethylene glycol 3350 oral powder for reconstitution: 17 gram(s) orally once a day (at bedtime)  senna oral tablet: 2 tab(s) orally once a day (at bedtime)  Tresiba FlexTouch 100 units/mL subcutaneous solution: 15 unit(s) subcutaneous once a day (at bedtime)

## 2021-11-28 NOTE — DISCHARGE NOTE PROVIDER - PROVIDER TOKENS
PROVIDER:[TOKEN:[[id number]],FOLLOWUP:[1 week],ESTABLISHEDPATIENT:[T]],PROVIDER:[TOKEN:[[id number]],FOLLOWUP:[1 week],ESTABLISHEDPATIENT:[T]],PROVIDER:[TOKEN:[[id number]],FOLLOWUP:[1 week],ESTABLISHEDPATIENT:[T]]

## 2021-11-28 NOTE — DISCHARGE NOTE PROVIDER - HOSPITAL COURSE
HPI      Hospital Course OUTSTANDING ISSUES ON DISCHARGE    HOSPITAL COURSE OUTSTANDING ISSUES ON DISCHARGE  PCP  #At Risk for Polypharmacy  [ ] Large number of medications with recent refill claims/fill history. Please     #HTN  [ ] SBP < 140 on Nifedipine 30mg ER PO BID, Isosorbide Mononitrate      HOSPITAL COURSE OUTSTANDING ISSUES ON DISCHARGE  PCP  #At Risk for Polypharmacy  [ ] Large number of medications with recent refill claims/fill history. Please perform medication reconciliation with Family, HHA providers and provide printed list of active medications for patient.  [ ] Consider continued discontinuation of any duplicate or unnecessary medications (e.g. Pancrealipase if not having pancreatic insufficiency, HCTZ d/t risk for hyponatremia, Plavix if not recently stented or requiring DAPT)    #HTN  [ ] SBP < 140 on Nifedipine 30mg ER PO BID, Isosorbide Mononitrate ER 30mg PO QD, but intermittently refusing these. Consider discontinuing fills on her other antihypertensives if not already done d/t well controlled BP & risk for hypotension in elderly.      HOSPITAL COURSE  Ms. Estrada is a 89F with HTN, T2DM (A1c 8.2%), HLD who presented with acute onset of chest pain, SOB and subacute nausea/vomiting (x10d/x3d respectively) who was found to have a significant leukocytosis associated with worsening altered mental status OUTSTANDING ISSUES ON DISCHARGE  PCP  #At Risk for Polypharmacy  [ ] Large number of medications with recent refill claims/fill history. Please perform medication reconciliation with Family, HHA providers and provide printed list of active medications for patient.  [ ] Consider continued discontinuation of any duplicate or unnecessary medications (e.g. Pancrealipase if not having pancreatic insufficiency, HCTZ d/t risk for hyponatremia, Plavix if not recently stented or requiring DAPT)    #HTN  [ ] SBP < 140 on Nifedipine 30mg ER PO BID, Isosorbide Mononitrate ER 30mg PO QD, but intermittently refusing these. Consider discontinuing fills on her other antihypertensives if not already done d/t well controlled BP & risk for hypotension in elderly.      HOSPITAL COURSE  Ms. Estrada is a 89F with HTN, T2DM (A1c 8.2%), HLD who presented with acute onset of chest pain, SOB and subacute nausea/vomiting (x10d/x3d respectively) who was found to have a significant leukocytosis associated with a decline from her baseline mental status and intermittent, overnight agitation. She had a CXR showing a potential consolidation in the RLL and a borderline rectal temperature, so she was managed as having Community-Acquired with CTX (11/25-12/2) & Azithromycin (11/25-28), with improvement in her mental status and leukocytosis. She, fortunately, responded appropriately to this and improved objectively with respect to her leukocytosis and mental status. She remained intermittently disengaged from audio interpreters, but ambulated with PT and otherwise was engaged when her family arrived. She was discharged in stable condition.    RELEVANT IMAGING  Chest X-Ray  FINDINGS:    Redemonstrated right basilar opacity probably atelectasis. No pneumothorax or pleural effusion. Heart size not well evaluated.    Retrocardiac opacity consistent with hiatal hernia is seen. No acute osseous findings.    IMPRESSION:    Right basilar opacity probably atelectasis.

## 2021-11-28 NOTE — DISCHARGE NOTE PROVIDER - NSDCCPTREATMENT_GEN_ALL_CORE_FT
PRINCIPAL PROCEDURE  Procedure: CT abdomen pelvis  Findings and Treatment: FINDINGS:  LOWER CHEST: Bibasilar subsegmental atelectasis.  LIVER: Subcentimeter hypodense lesions which are too small for accurate characterization. Parenchymal calcifications likely due to prior granulomatous disease.  BILE DUCTS: Normal caliber.  GALLBLADDER: Within normal limits.  SPLEEN: Indeterminate 1.7 cm low-attenuation lesion. Multiple parenchymal calcifications likely due to prior granulomatous disease.  PANCREAS: Within normal limits.  ADRENALS: Within normal limits.  KIDNEYS/URETERS: Bilateral renal subcentimeter hypodense lesions which are too small for accurate characterization. No hydronephrosis.  BLADDER: Within normal limits.  REPRODUCTIVE ORGANS: Atrophic uterus. No adnexal masses.  BOWEL: Moderate-sized hiatal hernia. No bowel obstruction or inflammation. Scattered sigmoid diverticulosis without diverticulitis. Appendix is normal.  PERITONEUM: No ascites.  VESSELS: Within normal limits.  RETROPERITONEUM/LYMPH NODES: No lymphadenopathy.  ABDOMINAL WALL: Anterior abdominal wall surgical clips.  BONES: Multiple old right rib fractures. Degenerative changes of the spine. Partially visualized left hip gamma nail.  IMPRESSION:  No bowel obstruction or inflammation.

## 2021-11-29 ENCOUNTER — TRANSCRIPTION ENCOUNTER (OUTPATIENT)
Age: 86
End: 2021-11-29

## 2021-11-29 VITALS
RESPIRATION RATE: 18 BRPM | DIASTOLIC BLOOD PRESSURE: 74 MMHG | OXYGEN SATURATION: 100 % | HEART RATE: 96 BPM | SYSTOLIC BLOOD PRESSURE: 155 MMHG | TEMPERATURE: 98 F

## 2021-11-29 LAB
CULTURE RESULTS: SIGNIFICANT CHANGE UP
GLUCOSE BLDC GLUCOMTR-MCNC: 100 MG/DL — HIGH (ref 70–99)
GLUCOSE BLDC GLUCOMTR-MCNC: 138 MG/DL — HIGH (ref 70–99)
LEGIONELLA AB SER-ACNC: <0.91 — SIGNIFICANT CHANGE UP (ref 0–0.9)
SPECIMEN SOURCE: SIGNIFICANT CHANGE UP

## 2021-11-29 PROCEDURE — 99232 SBSQ HOSP IP/OBS MODERATE 35: CPT

## 2021-11-29 PROCEDURE — 99239 HOSP IP/OBS DSCHRG MGMT >30: CPT | Mod: GC

## 2021-11-29 RX ORDER — INSULIN GLARGINE 100 [IU]/ML
15 INJECTION, SOLUTION SUBCUTANEOUS AT BEDTIME
Refills: 0 | Status: DISCONTINUED | OUTPATIENT
Start: 2021-11-29 | End: 2021-11-29

## 2021-11-29 RX ORDER — INSULIN LISPRO 100/ML
7 VIAL (ML) SUBCUTANEOUS
Refills: 0 | Status: DISCONTINUED | OUTPATIENT
Start: 2021-11-29 | End: 2021-11-29

## 2021-11-29 RX ORDER — INSULIN DEGLUDEC 100 U/ML
11 INJECTION, SOLUTION SUBCUTANEOUS
Qty: 0 | Refills: 0 | DISCHARGE

## 2021-11-29 RX ORDER — INSULIN DEGLUDEC 100 U/ML
15 INJECTION, SOLUTION SUBCUTANEOUS
Qty: 4.5 | Refills: 2
Start: 2021-11-29 | End: 2022-02-26

## 2021-11-29 RX ORDER — INSULIN DEGLUDEC 100 U/ML
15 INJECTION, SOLUTION SUBCUTANEOUS
Qty: 5 | Refills: 2
Start: 2021-11-29 | End: 2022-02-26

## 2021-11-29 RX ORDER — CEFPODOXIME PROXETIL 100 MG
1 TABLET ORAL
Qty: 4 | Refills: 0
Start: 2021-11-29 | End: 2021-11-30

## 2021-11-29 RX ORDER — INSULIN ASPART 100 [IU]/ML
14 INJECTION, SOLUTION SUBCUTANEOUS
Qty: 0 | Refills: 0 | DISCHARGE

## 2021-11-29 RX ORDER — ISOPROPYL ALCOHOL, BENZOCAINE .7; .06 ML/ML; ML/ML
1 SWAB TOPICAL
Qty: 100 | Refills: 1
Start: 2021-11-29 | End: 2022-01-17

## 2021-11-29 RX ORDER — INSULIN ASPART 100 [IU]/ML
7 INJECTION, SOLUTION SUBCUTANEOUS
Qty: 6.3 | Refills: 2
Start: 2021-11-29 | End: 2022-02-26

## 2021-11-29 RX ORDER — INSULIN ASPART 100 [IU]/ML
7 INJECTION, SOLUTION SUBCUTANEOUS
Qty: 5 | Refills: 2
Start: 2021-11-29 | End: 2022-02-26

## 2021-11-29 RX ADMIN — Medication 1 CAPSULE(S): at 09:05

## 2021-11-29 RX ADMIN — Medication 30 MILLIGRAM(S): at 05:07

## 2021-11-29 RX ADMIN — Medication 1 MILLIGRAM(S): at 12:24

## 2021-11-29 RX ADMIN — DORZOLAMIDE HYDROCHLORIDE 2 DROP(S): 20 SOLUTION/ DROPS OPHTHALMIC at 00:13

## 2021-11-29 RX ADMIN — Medication 81 MILLIGRAM(S): at 12:24

## 2021-11-29 RX ADMIN — DORZOLAMIDE HYDROCHLORIDE 2 DROP(S): 20 SOLUTION/ DROPS OPHTHALMIC at 09:06

## 2021-11-29 RX ADMIN — Medication 8 UNIT(S): at 09:10

## 2021-11-29 RX ADMIN — Medication 7 UNIT(S): at 12:23

## 2021-11-29 RX ADMIN — ISOSORBIDE MONONITRATE 30 MILLIGRAM(S): 60 TABLET, EXTENDED RELEASE ORAL at 12:24

## 2021-11-29 RX ADMIN — FAMOTIDINE 20 MILLIGRAM(S): 10 INJECTION INTRAVENOUS at 12:25

## 2021-11-29 RX ADMIN — Medication 1 CAPSULE(S): at 12:25

## 2021-11-29 RX ADMIN — ENOXAPARIN SODIUM 40 MILLIGRAM(S): 100 INJECTION SUBCUTANEOUS at 12:24

## 2021-11-29 NOTE — DISCHARGE NOTE NURSING/CASE MANAGEMENT/SOCIAL WORK - PATIENT PORTAL LINK FT
You can access the FollowMyHealth Patient Portal offered by [facility] by registering at the following website: [email or web address]. By joining [employer]’s FollowMyHealth portal, you will also be able to view your health information using other applications (apps) compatible with our system.

## 2021-11-29 NOTE — PROGRESS NOTE ADULT - PROVIDER SPECIALTY LIST ADULT
Cardiology
Endocrinology
Endocrinology
Internal Medicine

## 2021-11-29 NOTE — PROGRESS NOTE ADULT - PROBLEM SELECTOR PLAN 2
Intermittent reports of agitation, not engaging with staff or with provider teams. Collateral hx obtained from PCP indicates "if any dementia, it's very, very mild". Most likely hospital delirium in an elderly patient with ?polypharmacy, but given symptomatology, may be TME. Improving this morning even with haldol o/n. Borderline QTc after repeat haldol dosing over nights, would start a less QT prolonging medication.    Diagnostics  - NC HCT w/o acute intracranial pathology  - infectious work-up as below  - hyponatremia, hyperglycemia management as below    Therapeutics  - Delirium Precuations (frequent re-orientation, using  every time, windows open during the day)  - C/W Melatonin 6mg PO QHS  - C/W Seroquel 25mg PO QHS (not necessary on d/c)

## 2021-11-29 NOTE — PROGRESS NOTE ADULT - PROBLEM SELECTOR PLAN 12
Hospital Bundle  Fluids: PO ad michael  Electrolytes: Replete K > 4, Mg > 2, Phos > 3  Nutrition: Diet Pureed c Moderately Thick Liquids  PPX  ---VTE: LVX  ---GI: Home famotidine 20mg  ---Resp: I/S  Access: PIV  Code Status: FULL CODE  Dispo: Pending medical stabilization; family wants to take her home 11/28  -- Called ?PCP Jaylin Ken [phone] (see chart note 11/24)  -- Family to follow-up HHA to re-instate 24h HHA  -- family to bring HCP form

## 2021-11-29 NOTE — PROGRESS NOTE ADULT - ATTENDING COMMENTS
89 year old female with DM2, HTN, dementia, p/w chest pain, dyspnea, abdominal pain complicated by AMS (suspected delirium), and TAYLOR. CT abdomen unremarkable. Trops flat. Afebrile, hemodynamically stable. CT head without acute findings. Elevated WBC this admission, started on abx w/ CTX and azithro for suspected pna given new cough, found to have negative blood and urine cultures. WBC downtrending with antibiotics. Also with TAYLOR, now resolved after IVF. Patient's mental status significantly improved after daughter came to visit. Patient again today awake, interactive, and communicative with daughter. No O2 requirements. Optimized for discharge home with home aides and close outpatient follow up. Daughter updated at bedside. DC planning time 35 minutes.

## 2021-11-29 NOTE — PROGRESS NOTE ADULT - ASSESSMENT
Ms. Estrada is a 89 year old [place]-speaking woman with HTN, T2DM (A1c 8.2%), dementia (by report, NOS) who presents with acute on subacute chest pain with associated HASTINGS and abdominal pain whose differential is broad but includes ACS (incl. unstable angina vs NSTEMI), intra-abdominal pathologies (symptomatic worsening of hiatal hernia?). She may now also have altered mental status ico worsening leukocytosis despite continued lack of obvious etiology, though at this point with persistent cough and leukocytosis most likely pneumonia.

## 2021-11-29 NOTE — PROGRESS NOTE ADULT - PROBLEM SELECTOR PLAN 1
Leukocytosis worsened to 26 --> 14 11/27. (NLR 5 --> 10 --> 16 --> 4), improving. Appears to have waxing-waning AMS, which may indicate delirious vs TME as component of this. Severe Sepsis considered given now relatively lower BPs, but without persistent tachyardia (though pt on Carvedilol at home now several days out), no fevers (though pt elderly) but with TAYLOR. Given IVF gently given unknown cardiac history, ABx already initiated, monitoring closely.  Diagnostics  - MRSA Swab  - UCx (-), ULegionella, UStrep Ag never collected, likely too late now  - BCx x1 11/24 (NGTD)  - CXR: RLL ?atelectasis vs infxn    Therapeutics  [] C/W CTX 1g IV QD x5-7d (for ?CAP) (11/24-) (low threshold to broaden if no clinical/leukocytic improvement)  [] C/W Azithromycin 500mg IV QD x3-5d (for ?CAP) (11/24-11/29)  - CTM (trend NLR for better understanding of this pt's physiology)

## 2021-11-29 NOTE — PROGRESS NOTE ADULT - SUBJECTIVE AND OBJECTIVE BOX
Sammy Salcedo M.D.  [facility] PGY-1    PROGRESS NOTE:     Patient is a 89y old  Female who presents with a chief complaint of Chest Pain (28 Nov 2021 12:21)      -OVERNIGHT EVENTS-      -SUBJECTIVE-      MEDICATIONS  (STANDING):  aspirin enteric coated 81 milliGRAM(s) Oral daily  cefTRIAXone   IVPB 1000 milliGRAM(s) IV Intermittent every 24 hours  cefTRIAXone   IVPB      dextrose 40% Gel 15 Gram(s) Oral once  dextrose 5%. 1000 milliLiter(s) (50 mL/Hr) IV Continuous <Continuous>  dextrose 5%. 1000 milliLiter(s) (100 mL/Hr) IV Continuous <Continuous>  dextrose 50% Injectable 25 Gram(s) IV Push once  dextrose 50% Injectable 12.5 Gram(s) IV Push once  dextrose 50% Injectable 25 Gram(s) IV Push once  dorzolamide 2% Ophthalmic Solution 2 Drop(s) Both EYES every 8 hours  enoxaparin Injectable 40 milliGRAM(s) SubCutaneous daily  famotidine    Tablet 20 milliGRAM(s) Oral daily  folic acid 1 milliGRAM(s) Oral daily  glucagon  Injectable 1 milliGRAM(s) IntraMuscular once  insulin glargine Injectable (LANTUS) 16 Unit(s) SubCutaneous at bedtime  insulin lispro (ADMELOG) corrective regimen sliding scale   SubCutaneous three times a day before meals  insulin lispro (ADMELOG) corrective regimen sliding scale   SubCutaneous at bedtime  insulin lispro Injectable (ADMELOG) 8 Unit(s) SubCutaneous three times a day before meals  isosorbide   mononitrate ER Tablet (IMDUR) 30 milliGRAM(s) Oral daily  melatonin 6 milliGRAM(s) Oral at bedtime  NIFEdipine XL 30 milliGRAM(s) Oral two times a day  pancrelipase  (CREON 24,000 Lipase Units) 1 Capsule(s) Oral three times a day with meals  polyethylene glycol 3350 17 Gram(s) Oral at bedtime  QUEtiapine 25 milliGRAM(s) Oral at bedtime  senna 2 Tablet(s) Oral at bedtime    MEDICATIONS  (PRN):  guaiFENesin Oral Liquid (Sugar-Free) 200 milliGRAM(s) Oral every 6 hours PRN Cough  ondansetron    Tablet 4 milliGRAM(s) Oral daily PRN Nausea and/or Vomiting      -OBJECTIVE-    CAPILLARY BLOOD GLUCOSE      POCT Blood Glucose.: 123 mg/dL (28 Nov 2021 22:17)  POCT Blood Glucose.: 87 mg/dL (28 Nov 2021 17:30)  POCT Blood Glucose.: 196 mg/dL (28 Nov 2021 12:23)  POCT Blood Glucose.: 157 mg/dL (28 Nov 2021 08:59)    I&O's Summary      VITAL SIGNS  Vital Signs Last 24 Hrs  T(C): 37.2 (29 Nov 2021 05:06), Max: 37.5 (28 Nov 2021 21:20)  T(F): 98.9 (29 Nov 2021 05:06), Max: 99.5 (28 Nov 2021 21:20)  HR: 85 (29 Nov 2021 05:06) (60 - 94)  BP: 137/54 (29 Nov 2021 05:06) (132/53 - 138/53)  BP(mean): --  RR: 18 (29 Nov 2021 05:06) (17 - 18)  SpO2: 97% (29 Nov 2021 05:06) (95% - 97%)    PHYSICAL EXAM:  GENERAL/CONSTITUTIONAL: NAD, Awake, AOx3 (person, place, time)  HEENT: NCAT  ---EYES: no scleral icterus, EOMI, PERRLA  ---ENMT: MMM,   ---NECK: No palpable masses; no thyromegaly  CARDIO: RRR. Normal S1/S2, no m/r/g.  RESP: Normal respiratory effort; CTAB, no w/r/r  ABD: Soft, NTND; +BS.   : No CVAT.   MSK: No obvious deformity or ROM deficit.  ---EXTREMITIES: No peripheral edema. Peripheral pulses 2+ x4.  SKIN: Warm, dry. No rashes.   NEURO: Moves all four extremities spontaneously  PSYCH: Appropriate mood & affect. No VH/AH. No SI/HI.    LABS:                        9.9    14.89 )-----------( 387      ( 28 Nov 2021 07:31 )             29.6     11-28    133<L>  |  97<L>  |  23  ----------------------------<  123<H>  3.8   |  24  |  0.93    Ca    8.5      28 Nov 2021 07:31  Phos  3.2     11-28  Mg     2.00     11-28    TPro  5.9<L>  /  Alb  2.8<L>  /  TBili  0.3  /  DBili  x   /  AST  15  /  ALT  9   /  AlkPhos  87  11-28              MICROBIOLOGY      IMAGING        COORDINATION OF CARE:  ---PCP:  ---Consultants:   Sammy Salcedo M.D.  [facility] PGY-1    PROGRESS NOTE:     Patient is a 89y old  Female who presents with a chief complaint of Chest Pain (28 Nov 2021 12:21)      -OVERNIGHT EVENTS-  NAEON  - need to identify HHA to see if services can be re-instated    -SUBJECTIVE-  Austrian  #[id number]  Ms. Estrada says that her right arm hurts because of the IV. She says that she doesn't feel any different than when she came in but doesn't clarify this further. She doesn't respond when asked about chest pain, abdominal pain, or other changes.    MEDICATIONS  (STANDING):  aspirin enteric coated 81 milliGRAM(s) Oral daily  cefTRIAXone   IVPB 1000 milliGRAM(s) IV Intermittent every 24 hours  cefTRIAXone   IVPB      dextrose 40% Gel 15 Gram(s) Oral once  dextrose 5%. 1000 milliLiter(s) (50 mL/Hr) IV Continuous <Continuous>  dextrose 5%. 1000 milliLiter(s) (100 mL/Hr) IV Continuous <Continuous>  dextrose 50% Injectable 25 Gram(s) IV Push once  dextrose 50% Injectable 12.5 Gram(s) IV Push once  dextrose 50% Injectable 25 Gram(s) IV Push once  dorzolamide 2% Ophthalmic Solution 2 Drop(s) Both EYES every 8 hours  enoxaparin Injectable 40 milliGRAM(s) SubCutaneous daily  famotidine    Tablet 20 milliGRAM(s) Oral daily  folic acid 1 milliGRAM(s) Oral daily  glucagon  Injectable 1 milliGRAM(s) IntraMuscular once  insulin glargine Injectable (LANTUS) 16 Unit(s) SubCutaneous at bedtime  insulin lispro (ADMELOG) corrective regimen sliding scale   SubCutaneous three times a day before meals  insulin lispro (ADMELOG) corrective regimen sliding scale   SubCutaneous at bedtime  insulin lispro Injectable (ADMELOG) 8 Unit(s) SubCutaneous three times a day before meals  isosorbide   mononitrate ER Tablet (IMDUR) 30 milliGRAM(s) Oral daily  melatonin 6 milliGRAM(s) Oral at bedtime  NIFEdipine XL 30 milliGRAM(s) Oral two times a day  pancrelipase  (CREON 24,000 Lipase Units) 1 Capsule(s) Oral three times a day with meals  polyethylene glycol 3350 17 Gram(s) Oral at bedtime  QUEtiapine 25 milliGRAM(s) Oral at bedtime  senna 2 Tablet(s) Oral at bedtime    MEDICATIONS  (PRN):  guaiFENesin Oral Liquid (Sugar-Free) 200 milliGRAM(s) Oral every 6 hours PRN Cough  ondansetron    Tablet 4 milliGRAM(s) Oral daily PRN Nausea and/or Vomiting      -OBJECTIVE-    CAPILLARY BLOOD GLUCOSE      POCT Blood Glucose.: 123 mg/dL (28 Nov 2021 22:17)  POCT Blood Glucose.: 87 mg/dL (28 Nov 2021 17:30)  POCT Blood Glucose.: 196 mg/dL (28 Nov 2021 12:23)  POCT Blood Glucose.: 157 mg/dL (28 Nov 2021 08:59)    I&O's Summary      VITAL SIGNS  Vital Signs Last 24 Hrs  T(C): 37.2 (29 Nov 2021 05:06), Max: 37.5 (28 Nov 2021 21:20)  T(F): 98.9 (29 Nov 2021 05:06), Max: 99.5 (28 Nov 2021 21:20)  HR: 85 (29 Nov 2021 05:06) (60 - 94)  BP: 137/54 (29 Nov 2021 05:06) (132/53 - 138/53)  BP(mean): --  RR: 18 (29 Nov 2021 05:06) (17 - 18)  SpO2: 97% (29 Nov 2021 05:06) (95% - 97%)    PHYSICAL EXAM:  GENERAL/CONSTITUTIONAL: NAD, Awake, AOx3 (person, place, time)  HEENT: NCAT  ---EYES: no scleral icterus, EOMI, PERRLA  ---ENMT: MMM,   ---NECK: No palpable masses; no thyromegaly  CARDIO: RRR. Normal S1/S2, no m/r/g.  RESP: Normal respiratory effort  ABD: Soft, NTND  MSK: No obvious deformity or ROM deficit.  ---EXTREMITIES: Trace peripheral edema, c/w previous  SKIN: Warm, dry. No rashes.   NEURO: Moves all four extremities spontaneously  PSYCH: Appropriate mood & affect.    LABS:                        9.9    14.89 )-----------( 387      ( 28 Nov 2021 07:31 )             29.6     11-28    133<L>  |  97<L>  |  23  ----------------------------<  123<H>  3.8   |  24  |  0.93    Ca    8.5      28 Nov 2021 07:31  Phos  3.2     11-28  Mg     2.00     11-28    TPro  5.9<L>  /  Alb  2.8<L>  /  TBili  0.3  /  DBili  x   /  AST  15  /  ALT  9   /  AlkPhos  87  11-28    MICROBIOLOGY  No interval micro data    IMAGING  no interval imaging      COORDINATION OF CARE:  ---PCP: Jaylin Ken   Sammy Salcedo M.D.  [facility] PGY-1    PROGRESS NOTE:     Patient is a 89y old  Female who presents with a chief complaint of Chest Pain (28 Nov 2021 12:21)      -OVERNIGHT EVENTS-  NAEON    -SUBJECTIVE-  Salvadorean  #[id number]  Ms. Estrada says that her right arm hurts because of the IV. She says that she doesn't feel any different than when she came in but doesn't clarify this further. She doesn't respond when asked about chest pain, abdominal pain, or other changes.    MEDICATIONS  (STANDING):  aspirin enteric coated 81 milliGRAM(s) Oral daily  cefTRIAXone   IVPB 1000 milliGRAM(s) IV Intermittent every 24 hours  cefTRIAXone   IVPB      dextrose 40% Gel 15 Gram(s) Oral once  dextrose 5%. 1000 milliLiter(s) (50 mL/Hr) IV Continuous <Continuous>  dextrose 5%. 1000 milliLiter(s) (100 mL/Hr) IV Continuous <Continuous>  dextrose 50% Injectable 25 Gram(s) IV Push once  dextrose 50% Injectable 12.5 Gram(s) IV Push once  dextrose 50% Injectable 25 Gram(s) IV Push once  dorzolamide 2% Ophthalmic Solution 2 Drop(s) Both EYES every 8 hours  enoxaparin Injectable 40 milliGRAM(s) SubCutaneous daily  famotidine    Tablet 20 milliGRAM(s) Oral daily  folic acid 1 milliGRAM(s) Oral daily  glucagon  Injectable 1 milliGRAM(s) IntraMuscular once  insulin glargine Injectable (LANTUS) 16 Unit(s) SubCutaneous at bedtime  insulin lispro (ADMELOG) corrective regimen sliding scale   SubCutaneous three times a day before meals  insulin lispro (ADMELOG) corrective regimen sliding scale   SubCutaneous at bedtime  insulin lispro Injectable (ADMELOG) 8 Unit(s) SubCutaneous three times a day before meals  isosorbide   mononitrate ER Tablet (IMDUR) 30 milliGRAM(s) Oral daily  melatonin 6 milliGRAM(s) Oral at bedtime  NIFEdipine XL 30 milliGRAM(s) Oral two times a day  pancrelipase  (CREON 24,000 Lipase Units) 1 Capsule(s) Oral three times a day with meals  polyethylene glycol 3350 17 Gram(s) Oral at bedtime  QUEtiapine 25 milliGRAM(s) Oral at bedtime  senna 2 Tablet(s) Oral at bedtime    MEDICATIONS  (PRN):  guaiFENesin Oral Liquid (Sugar-Free) 200 milliGRAM(s) Oral every 6 hours PRN Cough  ondansetron    Tablet 4 milliGRAM(s) Oral daily PRN Nausea and/or Vomiting      -OBJECTIVE-    CAPILLARY BLOOD GLUCOSE      POCT Blood Glucose.: 123 mg/dL (28 Nov 2021 22:17)  POCT Blood Glucose.: 87 mg/dL (28 Nov 2021 17:30)  POCT Blood Glucose.: 196 mg/dL (28 Nov 2021 12:23)  POCT Blood Glucose.: 157 mg/dL (28 Nov 2021 08:59)    I&O's Summary      VITAL SIGNS  Vital Signs Last 24 Hrs  T(C): 37.2 (29 Nov 2021 05:06), Max: 37.5 (28 Nov 2021 21:20)  T(F): 98.9 (29 Nov 2021 05:06), Max: 99.5 (28 Nov 2021 21:20)  HR: 85 (29 Nov 2021 05:06) (60 - 94)  BP: 137/54 (29 Nov 2021 05:06) (132/53 - 138/53)  BP(mean): --  RR: 18 (29 Nov 2021 05:06) (17 - 18)  SpO2: 97% (29 Nov 2021 05:06) (95% - 97%)    PHYSICAL EXAM:  GENERAL/CONSTITUTIONAL: NAD, Awake, AOx3 (person, place, time)  HEENT: NCAT  ---EYES: no scleral icterus, EOMI, PERRLA  ---ENMT: MMM,   ---NECK: No palpable masses; no thyromegaly  CARDIO: RRR. Normal S1/S2, no m/r/g.  RESP: Normal respiratory effort  ABD: Soft, NTND  MSK: No obvious deformity or ROM deficit.  ---EXTREMITIES: Trace peripheral edema, c/w previous  SKIN: Warm, dry. No rashes.   NEURO: Moves all four extremities spontaneously  PSYCH: Appropriate mood & affect.    LABS:                        9.9    14.89 )-----------( 387      ( 28 Nov 2021 07:31 )             29.6     11-28    133<L>  |  97<L>  |  23  ----------------------------<  123<H>  3.8   |  24  |  0.93    Ca    8.5      28 Nov 2021 07:31  Phos  3.2     11-28  Mg     2.00     11-28    TPro  5.9<L>  /  Alb  2.8<L>  /  TBili  0.3  /  DBili  x   /  AST  15  /  ALT  9   /  AlkPhos  87  11-28    MICROBIOLOGY  No interval micro data    IMAGING  no interval imaging      COORDINATION OF CARE:  ---PCP: Jaylin Ken

## 2021-11-29 NOTE — DISCHARGE NOTE NURSING/CASE MANAGEMENT/SOCIAL WORK - NSSCTYPOFSERV_GEN_ALL_CORE
RN/PT services. Nurse to visit within 24-48h. Nurse to call prior to visit to arrange. PT services to follow.

## 2021-11-29 NOTE — PROGRESS NOTE ADULT - PROBLEM/PLAN-1
Patient teaching on [address] [place] [address] performed. Pt injected 0.6 mg in the office today with a sample pen. Patient demonstrated back, all questions were answered and patient verbalized understanding.  PORFIRIO
DISPLAY PLAN FREE TEXT

## 2021-11-29 NOTE — PROGRESS NOTE ADULT - SUBJECTIVE AND OBJECTIVE BOX
DIABETES FOLLOW UP : Seen earlier today    INTERVAL HX: pt solmnolent during visit, d/w RN pt eating full meals as provided. BG mostly at goal past 24 hours, tighlty controlled at dinner yesterday to 87. FBG tightly controlled today as well 100.       Review of Systems:  unable     Allergies    No Known Allergies    Intolerances      MEDICATIONS  (STANDING):  aspirin enteric coated 81 milliGRAM(s) Oral daily  cefTRIAXone   IVPB 1000 milliGRAM(s) IV Intermittent every 24 hours  cefTRIAXone   IVPB      dextrose 40% Gel 15 Gram(s) Oral once  dextrose 5%. 1000 milliLiter(s) (50 mL/Hr) IV Continuous <Continuous>  dextrose 5%. 1000 milliLiter(s) (100 mL/Hr) IV Continuous <Continuous>  dextrose 50% Injectable 25 Gram(s) IV Push once  dextrose 50% Injectable 12.5 Gram(s) IV Push once  dextrose 50% Injectable 25 Gram(s) IV Push once  dorzolamide 2% Ophthalmic Solution 2 Drop(s) Both EYES every 8 hours  enoxaparin Injectable 40 milliGRAM(s) SubCutaneous daily  famotidine    Tablet 20 milliGRAM(s) Oral daily  folic acid 1 milliGRAM(s) Oral daily  glucagon  Injectable 1 milliGRAM(s) IntraMuscular once  insulin glargine Injectable (LANTUS) 15 Unit(s) SubCutaneous at bedtime  insulin lispro (ADMELOG) corrective regimen sliding scale   SubCutaneous at bedtime  insulin lispro (ADMELOG) corrective regimen sliding scale   SubCutaneous three times a day before meals  insulin lispro Injectable (ADMELOG) 7 Unit(s) SubCutaneous three times a day before meals  isosorbide   mononitrate ER Tablet (IMDUR) 30 milliGRAM(s) Oral daily  melatonin 6 milliGRAM(s) Oral at bedtime  NIFEdipine XL 30 milliGRAM(s) Oral two times a day  pancrelipase  (CREON 24,000 Lipase Units) 1 Capsule(s) Oral three times a day with meals  polyethylene glycol 3350 17 Gram(s) Oral at bedtime  QUEtiapine 25 milliGRAM(s) Oral at bedtime  senna 2 Tablet(s) Oral at bedtime      PHYSICAL EXAM:  VITALS: T(C): 36.6 (11-29-21 @ 13:38)  T(F): 97.8 (11-29-21 @ 13:38), Max: 99.5 (11-28-21 @ 21:20)  HR: 96 (11-29-21 @ 13:38) (60 - 96)  BP: 155/74 (11-29-21 @ 13:38) (132/53 - 155/74)  RR:  (18 - 18)  SpO2:  (96% - 100%)  Wt(kg): --  GENERAL: female laying in bed in NAD  Abdomen: Soft, nontender, non distended  Extremities: Warm  NEURO: solmnolent    LABS:  POCT Blood Glucose.: 138 mg/dL (11-29-21 @ 12:18)  POCT Blood Glucose.: 100 mg/dL (11-29-21 @ 08:46)  POCT Blood Glucose.: 123 mg/dL (11-28-21 @ 22:17)  POCT Blood Glucose.: 87 mg/dL (11-28-21 @ 17:30)  POCT Blood Glucose.: 196 mg/dL (11-28-21 @ 12:23)  POCT Blood Glucose.: 157 mg/dL (11-28-21 @ 08:59)  POCT Blood Glucose.: 220 mg/dL (11-27-21 @ 21:59)  POCT Blood Glucose.: 143 mg/dL (11-27-21 @ 17:37)  POCT Blood Glucose.: 111 mg/dL (11-27-21 @ 12:24)  POCT Blood Glucose.: 220 mg/dL (11-27-21 @ 09:07)  POCT Blood Glucose.: 184 mg/dL (11-26-21 @ 22:36)  POCT Blood Glucose.: 230 mg/dL (11-26-21 @ 16:37)                            9.9    14.89 )-----------( 387      ( 28 Nov 2021 07:31 )             29.6       11-28    133<L>  |  97<L>  |  23  ----------------------------<  123<H>  3.8   |  24  |  0.93    EGFR if : 59<L>  EGFR if non : 51<L>    Ca    8.5      11-28  Mg     2.00     11-28  Phos  3.2     11-28    TPro  5.9<L>  /  Alb  2.8<L>  /  TBili  0.3  /  DBili  x   /  AST  15  /  ALT  9   /  AlkPhos  87  11-28 11-24 Chol 159 Direct LDL -- LDL calculated 91 HDL 50<L> Trig 91    Thyroid Function Tests:  11-23 @ 04:48 TSH 2.49 FreeT4 -- T3 -- Anti TPO -- Anti Thyroglobulin Ab -- TSI --        A1C with Estimated Average Glucose Result: 8.2 % (11-23-21 @ 04:34)      Estimated Average Glucose: 189 (11-23-21 @ 04:34)

## 2021-11-29 NOTE — PROGRESS NOTE ADULT - ASSESSMENT
Ms. Estrada is a Indian-speaking 89F with T2DM (A1c 8.2%), HTN, Dementia (by collateral report) who presents complaining of chest pressure, abdominal pain, and nausea for 1 week - 1 month intermittently. Endocrine consulted for T2DM management.    Adequately controlled T2DM with hyperglycemia  A1c 8.2%  -Lower Lantus to 15 units at bedtime. DO NOT HOLD IF NPO.  -Lower Admelog to 7 units TID pre-meal. HOLD IF NPO.  -Use low correction scale pre-meal  -No correction scale at bedtime  -Fingerstick BG before meals and bedtime and 3am  -Goal -250 for this elderly patient with multiple medical comorbidities and dementia- avoiding hypoglycemia is the goal  -Carbohydrate consistent diet    Discharge plan:    home regimen:  Per recent Endocrinology outpatient records (11/5/21): taking Tresiba 20-22 units qhs. Prescribed correction scale pre-meal as follows: before breakfast is to take 10-14-16-67-89-92-20-22 units Novolog; before lunch 9-10-12-14-15-16-17-18-20 and before dinner is to take 7-8-10-49-37-37-14-15 Novolog. Unclear what the BG cutoffs are for the scales. Apparently patient frequently missing pre-meal insulin if FS in low-mid 100s. No novolog at bedtime and encouraged to have bedtime snack.    -Can discharge patient home on basal/bolus insulin if she is safe to self-inject insulin at home or has adequate help. per d/w Team pt going home w/ 24 hour aides who help pt administer insulin and BG monitoring  -Given Tightly controlled BG past 24 hours despite PO intake, will discharge on less intense insulin regimen   Tresiba 15 units sq qhs  Novolog 7 units sq TID AC  BG Goal would be 100-250mg/dl avoiding hypoglycemia is the goal  A1C goal would be <8 given age and comorbidities   Call your endocrinologist if BG trending >200 , or if >400 or <100 may need further adjustments  f/u w/ endocrinologist w/i 1 month  recommend social work/case management involvement. Final regimen pending clinical course.  -Recommend routine outpatient ophthalmology and endocrinology f/u. Sees Dr. Espinal for Endocrine    HTN  -Outpatient goal BP <140/80 given age   On Imdur and nifedipine. Management per primary team.    HLD  -Consider risks/benefits of statin use in this elderly patient, would recommend not initiating      Discussed with patient and primary  team Resident  Contact via [employer] Teams Tues/Thurs/Friday during business hours  On evenings and weekends, please call [phone] or page endocrine fellow on call.   Please note that this patient may be followed by different provider tomorrow. If no answer, contact endocrine fellow on call [phone] M-F 9a-5pm  realSociable password: [email or web address]    Time spent on encounter: 28Minutes for development of plan of care/coordination of care/glycemic control through review of labs, blood glucose values and vital signs.

## 2021-12-09 ENCOUNTER — RX RENEWAL (OUTPATIENT)
Age: 86
End: 2021-12-09

## 2021-12-09 RX ORDER — INSULIN DEGLUDEC INJECTION 100 U/ML
100 INJECTION, SOLUTION SUBCUTANEOUS
Qty: 3 | Refills: 2 | Status: ACTIVE | COMMUNITY
Start: 2019-08-22 | End: 1900-01-01

## 2022-05-27 NOTE — PHYSICAL THERAPY INITIAL EVALUATION ADULT - AMBULATION SKILLS, REHAB EVAL
SERVICE TO [facility]    Electronically signed by: Mary Gong RN on 05/27/22 1102 Status: Active   Ordering user: Mary Gong RN 05/27/22 1102 Authorized by: Risa Winslow MD   Ordering mode: Standard   Cosigning events   Electronically cosigned by Risa Winslow MD 05/27/22 1110 for Ordering   Frequency: Â 05/27/22 - Â  Released by: Mary Gong RN 05/27/22 1102   Diagnoses   Long term (current) use of anticoagulants [Z79.01]     Questionnaire    Question Answer   INR Goal 2.0-3.0   Next INR Due Date: 6/3/2022   Requested start date for Anticoag Management 5/27/2022   Responsible Group / Send INR Reminders To: [facility] CARDIOLOGY   Indefinite Therapy: Yes       Comments    History of LV thrombus, DVT and PE.  Â Needs to be on warfarin for possible listing for heart transplant   Currently being seen by tanya at home RN- has PICC     Added to patient list: yes  Chart created: yes needs device and assist

## 2023-08-09 NOTE — DISCHARGE NOTE PROVIDER - NSDCCAREPROVSEEN_GEN_ALL_CORE_FT
DERRICKJ Team 3 I, Jaelyn Barrera MD, performed the initial face to face bedside interview with this patient regarding history of present illness, review of symptoms and relevant past medical, social and family history.  I completed an independent physical examination.  I was the initial provider who evaluated this patient. I have signed out the follow up of any pending tests (i.e. labs, radiological studies) to the ACP.  I have communicated the patient’s plan of care and disposition with the ACP.  The history, relevant review of systems, past medical and surgical history, medical decision making, and physical examination was documented by the scribe in my presence and I attest to the accuracy of the documentation. [facility] Medicine Team 3  Endocrinology